# Patient Record
Sex: FEMALE | Race: WHITE | NOT HISPANIC OR LATINO | Employment: FULL TIME | URBAN - METROPOLITAN AREA
[De-identification: names, ages, dates, MRNs, and addresses within clinical notes are randomized per-mention and may not be internally consistent; named-entity substitution may affect disease eponyms.]

---

## 2015-01-02 LAB — EXTERNAL HIV SCREEN: NORMAL

## 2017-03-17 ENCOUNTER — APPOINTMENT (EMERGENCY)
Dept: RADIOLOGY | Facility: HOSPITAL | Age: 27
End: 2017-03-17
Payer: COMMERCIAL

## 2017-03-17 ENCOUNTER — HOSPITAL ENCOUNTER (EMERGENCY)
Facility: HOSPITAL | Age: 27
Discharge: HOME | End: 2017-03-17
Attending: EMERGENCY MEDICINE | Admitting: EMERGENCY MEDICINE
Payer: COMMERCIAL

## 2017-03-17 VITALS
DIASTOLIC BLOOD PRESSURE: 58 MMHG | TEMPERATURE: 97.8 F | RESPIRATION RATE: 16 BRPM | WEIGHT: 253.53 LBS | HEART RATE: 77 BPM | OXYGEN SATURATION: 97 % | SYSTOLIC BLOOD PRESSURE: 102 MMHG

## 2017-03-17 DIAGNOSIS — S20.219A CHEST WALL CONTUSION: ICD-10-CM

## 2017-03-17 DIAGNOSIS — S46.911A RIGHT SHOULDER STRAIN: ICD-10-CM
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
## 2017-03-17 PROCEDURE — 99284 EMERGENCY DEPT VISIT MOD MDM: CPT

## 2017-03-17 PROCEDURE — 71101 X-RAY EXAM UNILAT RIBS/CHEST: CPT

## 2017-03-17 PROCEDURE — 73030 X-RAY EXAM OF SHOULDER: CPT

## 2017-03-17 RX ORDER — IBUPROFEN 400 MG/1
200 TABLET ORAL EVERY 6 HOURS PRN
COMMUNITY
End: 2019-05-07

## 2017-03-17 RX ORDER — IBUPROFEN 600 MG/1
600 TABLET ORAL ONCE
Status: COMPLETED | OUTPATIENT
Start: 2017-03-17 | End: 2017-03-17

## 2017-03-17 RX ORDER — HYDROCODONE BITARTRATE AND ACETAMINOPHEN 5; 325 MG/1; MG/1
1 TABLET ORAL EVERY 6 HOURS PRN
COMMUNITY
End: 2017-08-03 | Stop reason: ALTCHOICE

## 2017-03-17 RX ORDER — IBUPROFEN 600 MG/1
600 TABLET ORAL EVERY 6 HOURS PRN
Qty: 30 TABLET | Refills: 0 | Status: SHIPPED | OUTPATIENT
Start: 2017-03-17 | End: 2017-08-03 | Stop reason: ALTCHOICE

## 2017-03-17 RX ADMIN — IBUPROFEN 600 MG: 600 TABLET, FILM COATED ORAL at 17:31

## 2017-08-03 ENCOUNTER — APPOINTMENT (EMERGENCY)
Dept: RADIOLOGY | Facility: HOSPITAL | Age: 27
End: 2017-08-03
Payer: COMMERCIAL

## 2017-08-03 ENCOUNTER — HOSPITAL ENCOUNTER (EMERGENCY)
Facility: HOSPITAL | Age: 27
Discharge: HOME | End: 2017-08-03
Admitting: EMERGENCY MEDICINE
Payer: COMMERCIAL

## 2017-08-03 VITALS
OXYGEN SATURATION: 98 % | WEIGHT: 228 LBS | TEMPERATURE: 98.4 F | RESPIRATION RATE: 16 BRPM | SYSTOLIC BLOOD PRESSURE: 126 MMHG | DIASTOLIC BLOOD PRESSURE: 57 MMHG | HEART RATE: 71 BPM

## 2017-08-03 DIAGNOSIS — S93.401A RIGHT ANKLE SPRAIN: ICD-10-CM

## 2017-08-03 DIAGNOSIS — S89.91XA RIGHT KNEE INJURY, INITIAL ENCOUNTER: Primary | ICD-10-CM

## 2017-08-03 PROCEDURE — 73564 X-RAY EXAM KNEE 4 OR MORE: CPT

## 2017-08-03 PROCEDURE — 99283 EMERGENCY DEPT VISIT LOW MDM: CPT

## 2017-08-03 PROCEDURE — 73610 X-RAY EXAM OF ANKLE: CPT

## 2017-08-03 RX ORDER — NAPROXEN 500 MG/1
500 TABLET ORAL 2 TIMES DAILY WITH MEALS
Qty: 10 TABLET | Refills: 0 | Status: SHIPPED | OUTPATIENT
Start: 2017-08-03 | End: 2019-05-07

## 2017-08-03 RX ORDER — NAPROXEN 250 MG/1
500 TABLET ORAL ONCE
Status: COMPLETED | OUTPATIENT
Start: 2017-08-03 | End: 2017-08-03

## 2017-08-03 RX ADMIN — NAPROXEN 500 MG: 250 TABLET ORAL at 16:04

## 2019-01-20 ENCOUNTER — HOSPITAL ENCOUNTER (EMERGENCY)
Facility: HOSPITAL | Age: 29
Discharge: HOME | End: 2019-01-20
Attending: EMERGENCY MEDICINE | Admitting: EMERGENCY MEDICINE
Payer: COMMERCIAL

## 2019-01-20 ENCOUNTER — APPOINTMENT (EMERGENCY)
Dept: RADIOLOGY | Facility: HOSPITAL | Age: 29
End: 2019-01-20
Payer: COMMERCIAL

## 2019-01-20 VITALS
SYSTOLIC BLOOD PRESSURE: 102 MMHG | HEART RATE: 67 BPM | OXYGEN SATURATION: 98 % | TEMPERATURE: 98.5 F | DIASTOLIC BLOOD PRESSURE: 52 MMHG | WEIGHT: 220 LBS | RESPIRATION RATE: 16 BRPM

## 2019-01-20 DIAGNOSIS — J11.1 INFLUENZA: Primary | ICD-10-CM

## 2019-01-20 LAB
ANION GAP SERPL CALCULATED.3IONS-SCNC: 10 MMOL/L (ref 4–13)
BACTERIA UR QL AUTO: ABNORMAL /HPF
BASOPHILS # BLD AUTO: 0.01 THOUSANDS/ΜL (ref 0–0.1)
BASOPHILS NFR BLD AUTO: 0 % (ref 0–1)
BILIRUB UR QL STRIP: ABNORMAL
BUN SERPL-MCNC: 6 MG/DL (ref 5–25)
CALCIUM SERPL-MCNC: 8.6 MG/DL (ref 8.3–10.1)
CHLORIDE SERPL-SCNC: 106 MMOL/L (ref 100–108)
CLARITY UR: ABNORMAL
CLARITY, POC: ABNORMAL
CO2 SERPL-SCNC: 26 MMOL/L (ref 21–32)
COLOR UR: ABNORMAL
COLOR, POC: ABNORMAL
CREAT SERPL-MCNC: 0.66 MG/DL (ref 0.6–1.3)
EOSINOPHIL # BLD AUTO: 0.01 THOUSAND/ΜL (ref 0–0.61)
EOSINOPHIL NFR BLD AUTO: 0 % (ref 0–6)
ERYTHROCYTE [DISTWIDTH] IN BLOOD BY AUTOMATED COUNT: 13.9 % (ref 11.6–15.1)
EXT PREG TEST URINE: NEGATIVE
FLUAV AG SPEC QL IA: POSITIVE
FLUBV AG SPEC QL IA: NEGATIVE
GFR SERPL CREATININE-BSD FRML MDRD: 121 ML/MIN/1.73SQ M
GLUCOSE SERPL-MCNC: 89 MG/DL (ref 65–140)
GLUCOSE UR STRIP-MCNC: NEGATIVE MG/DL
HCT VFR BLD AUTO: 39 % (ref 34.8–46.1)
HGB BLD-MCNC: 13.2 G/DL (ref 11.5–15.4)
HGB UR QL STRIP.AUTO: NEGATIVE
IMM GRANULOCYTES # BLD AUTO: 0.01 THOUSAND/UL (ref 0–0.2)
IMM GRANULOCYTES NFR BLD AUTO: 0 % (ref 0–2)
KETONES UR STRIP-MCNC: ABNORMAL MG/DL
LEUKOCYTE ESTERASE UR QL STRIP: NEGATIVE
LYMPHOCYTES # BLD AUTO: 0.84 THOUSANDS/ΜL (ref 0.6–4.47)
LYMPHOCYTES NFR BLD AUTO: 22 % (ref 14–44)
MCH RBC QN AUTO: 29.5 PG (ref 26.8–34.3)
MCHC RBC AUTO-ENTMCNC: 33.8 G/DL (ref 31.4–37.4)
MCV RBC AUTO: 87 FL (ref 82–98)
MONOCYTES # BLD AUTO: 0.67 THOUSAND/ΜL (ref 0.17–1.22)
MONOCYTES NFR BLD AUTO: 18 % (ref 4–12)
MUCOUS THREADS UR QL AUTO: ABNORMAL
NEUTROPHILS # BLD AUTO: 2.21 THOUSANDS/ΜL (ref 1.85–7.62)
NEUTS SEG NFR BLD AUTO: 60 % (ref 43–75)
NITRITE UR QL STRIP: NEGATIVE
NON-SQ EPI CELLS URNS QL MICRO: ABNORMAL /HPF
NRBC BLD AUTO-RTO: 0 /100 WBCS
PH UR STRIP.AUTO: 6 [PH] (ref 4.5–8)
PLATELET # BLD AUTO: 198 THOUSANDS/UL (ref 149–390)
PMV BLD AUTO: 9.6 FL (ref 8.9–12.7)
POTASSIUM SERPL-SCNC: 3.3 MMOL/L (ref 3.5–5.3)
PROT UR STRIP-MCNC: ABNORMAL MG/DL
RBC # BLD AUTO: 4.47 MILLION/UL (ref 3.81–5.12)
RBC #/AREA URNS AUTO: ABNORMAL /HPF
S PYO AG THROAT QL: NEGATIVE
SODIUM SERPL-SCNC: 142 MMOL/L (ref 136–145)
SP GR UR STRIP.AUTO: 1.02 (ref 1–1.03)
UROBILINOGEN UR QL STRIP.AUTO: 0.2 E.U./DL
WBC # BLD AUTO: 3.75 THOUSAND/UL (ref 4.31–10.16)
WBC #/AREA URNS AUTO: ABNORMAL /HPF

## 2019-01-20 PROCEDURE — 81001 URINALYSIS AUTO W/SCOPE: CPT

## 2019-01-20 PROCEDURE — 80048 BASIC METABOLIC PNL TOTAL CA: CPT | Performed by: PHYSICIAN ASSISTANT

## 2019-01-20 PROCEDURE — 85025 COMPLETE CBC W/AUTO DIFF WBC: CPT | Performed by: PHYSICIAN ASSISTANT

## 2019-01-20 PROCEDURE — 81025 URINE PREGNANCY TEST: CPT | Performed by: PHYSICIAN ASSISTANT

## 2019-01-20 PROCEDURE — 87430 STREP A AG IA: CPT | Performed by: PHYSICIAN ASSISTANT

## 2019-01-20 PROCEDURE — 96360 HYDRATION IV INFUSION INIT: CPT

## 2019-01-20 PROCEDURE — 71046 X-RAY EXAM CHEST 2 VIEWS: CPT

## 2019-01-20 PROCEDURE — 99283 EMERGENCY DEPT VISIT LOW MDM: CPT

## 2019-01-20 PROCEDURE — 87400 INFLUENZA A/B EACH AG IA: CPT | Performed by: PHYSICIAN ASSISTANT

## 2019-01-20 PROCEDURE — 36415 COLL VENOUS BLD VENIPUNCTURE: CPT | Performed by: PHYSICIAN ASSISTANT

## 2019-01-20 RX ORDER — PROMETHAZINE HYDROCHLORIDE AND CODEINE PHOSPHATE 6.25; 1 MG/5ML; MG/5ML
5 SYRUP ORAL
Qty: 120 ML | Refills: 0 | Status: SHIPPED | OUTPATIENT
Start: 2019-01-20 | End: 2019-01-30

## 2019-01-20 RX ORDER — ACETAMINOPHEN 325 MG/1
650 TABLET ORAL ONCE
Status: COMPLETED | OUTPATIENT
Start: 2019-01-20 | End: 2019-01-20

## 2019-01-20 RX ORDER — GUAIFENESIN 200 MG/1
400 TABLET ORAL EVERY 4 HOURS PRN
Qty: 30 TABLET | Refills: 0 | Status: SHIPPED | OUTPATIENT
Start: 2019-01-20 | End: 2019-05-07

## 2019-01-20 RX ADMIN — SODIUM CHLORIDE 1000 ML: 0.9 INJECTION, SOLUTION INTRAVENOUS at 11:06

## 2019-01-20 RX ADMIN — ACETAMINOPHEN 650 MG: 325 TABLET, FILM COATED ORAL at 11:08

## 2019-01-20 NOTE — ED PROVIDER NOTES
History  Chief Complaint   Patient presents with    Flu Symptoms     Patient reports X2 days she has had a cough, patient states she was febrile at 103 6 and has also been experiencing body aches, fatigue, dizziness  and vomiting       28y  o female with PMH of anxiety, charcot-rosy-tooth disease and depression presents to the ER for cough for 3 days  Patient has also been experiencing fever (max 103 7), dyspnea when coughing, dizziness that comes and goes when standing up, post-tussive vomiting, myalgias and sore throat  She has been taking Motrin for pain with relief  She describes her cough as wet  Symptoms are constant  She denies sick contacts or recent travel  Associated symptoms: rhinorrhea  She denies chills, chest pain, nausea, diarrhea, abdominal pain, weakness or paresthesias  History provided by:  Patient   used: No        Prior to Admission Medications   Prescriptions Last Dose Informant Patient Reported? Taking?   ibuprofen (MOTRIN) 400 mg tablet   Yes No   Sig: Take 200 mg by mouth every 6 (six) hours as needed for mild pain     naproxen (NAPROSYN) 500 mg tablet   No No   Sig: Take 1 tablet by mouth 2 (two) times a day with meals for 5 days   sertraline (ZOLOFT) 50 mg tablet   Yes No   Sig: Take 50 mg by mouth as needed        Facility-Administered Medications: None       Past Medical History:   Diagnosis Date    Anxiety     CMTD (Charcot-Rosy-Tooth disease)     Depression        Past Surgical History:   Procedure Laterality Date    ANKLE SURGERY      DILATION AND CURETTAGE OF UTERUS      TUBAL LIGATION         History reviewed  No pertinent family history  I have reviewed and agree with the history as documented  Social History   Substance Use Topics    Smoking status: Never Smoker    Smokeless tobacco: Not on file    Alcohol use No        Review of Systems   Constitutional: Positive for activity change, appetite change and fever  Negative for chills     HENT: Positive for ear pain, rhinorrhea and sore throat  Negative for congestion, drooling, ear discharge and facial swelling  Eyes: Negative for redness  Respiratory: Positive for cough and shortness of breath  Cardiovascular: Negative for chest pain  Gastrointestinal: Positive for vomiting (post-tussive)  Negative for abdominal pain, diarrhea and nausea  Musculoskeletal: Negative for neck stiffness  Skin: Negative for rash  Allergic/Immunologic: Negative for food allergies  Neurological: Positive for dizziness and headaches  Negative for weakness and numbness  Physical Exam  Physical Exam   Constitutional:  Non-toxic appearance  No distress  HENT:   Head: Normocephalic and atraumatic  Right Ear: Tympanic membrane, external ear and ear canal normal  No drainage, swelling or tenderness  No foreign bodies  Tympanic membrane is not erythematous  No hemotympanum  Left Ear: Tympanic membrane, external ear and ear canal normal  No drainage, swelling or tenderness  No foreign bodies  Tympanic membrane is not erythematous  No hemotympanum  Nose: Rhinorrhea present  Mouth/Throat: Uvula is midline and mucous membranes are normal  No uvula swelling  Posterior oropharyngeal erythema present  No posterior oropharyngeal edema or tonsillar abscesses  No tonsillar exudate  Neck: Normal range of motion and phonation normal  Neck supple  No tracheal deviation present  Cardiovascular: Normal rate, regular rhythm, S1 normal, S2 normal and normal heart sounds  Exam reveals no gallop and no friction rub  No murmur heard  Pulmonary/Chest: Effort normal and breath sounds normal  No respiratory distress  She has no decreased breath sounds  She has no wheezes  She has no rhonchi  She has no rales  She exhibits no tenderness  Abdominal: Soft  Bowel sounds are normal  There is no tenderness  There is no rebound and no guarding  Neurological: She is alert  GCS eye subscore is 4   GCS verbal subscore is 5  GCS motor subscore is 6  Skin: Skin is warm and dry  No rash noted  Psychiatric: She has a normal mood and affect  Nursing note and vitals reviewed  Vital Signs  ED Triage Vitals   Temperature Pulse Respirations Blood Pressure SpO2   01/20/19 0937 01/20/19 0938 01/20/19 0938 01/20/19 0938 01/20/19 0938   (!) 97 °F (36 1 °C) 85 18 134/58 95 %      Temp Source Heart Rate Source Patient Position - Orthostatic VS BP Location FiO2 (%)   01/20/19 0937 01/20/19 0938 01/20/19 0938 01/20/19 0938 --   Temporal Monitor Sitting Right arm       Pain Score       01/20/19 0938       7           Vitals:    01/20/19 0938 01/20/19 1048 01/20/19 1257   BP: 134/58 115/70 102/52   Pulse: 85 66 67   Patient Position - Orthostatic VS: Sitting  Lying       Visual Acuity      ED Medications  Medications   sodium chloride 0 9 % bolus 1,000 mL (0 mL Intravenous Stopped 1/20/19 1206)   acetaminophen (TYLENOL) tablet 650 mg (650 mg Oral Given 1/20/19 1108)       Diagnostic Studies  Results Reviewed     Procedure Component Value Units Date/Time    Basic metabolic panel [[id number]]  (Abnormal) Collected:  01/20/19 1106    Lab Status:  Final result Specimen:  Blood from Arm, Right Updated:  01/20/19 1124     Sodium 142 mmol/L      Potassium 3 3 (L) mmol/L      Chloride 106 mmol/L      CO2 26 mmol/L      ANION GAP 10 mmol/L      BUN 6 mg/dL      Creatinine 0 66 mg/dL      Glucose 89 mg/dL      Calcium 8 6 mg/dL      eGFR 121 ml/min/1 73sq m     Narrative:         National Kidney Disease Education Program recommendations are as follows:  GFR calculation is accurate only with a steady state creatinine  Chronic Kidney disease less than 60 ml/min/1 73 sq  meters  Kidney failure less than 15 ml/min/1 73 sq  meters      CBC and differential [[id number]]  (Abnormal) Collected:  01/20/19 1106    Lab Status:  Final result Specimen:  Blood from Arm, Right Updated:  01/20/19 1115     WBC 3 75 (L) Thousand/uL      RBC 4 47 Million/uL      Hemoglobin 13 2 g/dL      Hematocrit 39 0 %      MCV 87 fL      MCH 29 5 pg      MCHC 33 8 g/dL      RDW 13 9 %      MPV 9 6 fL      Platelets 998 Thousands/uL      nRBC 0 /100 WBCs      Neutrophils Relative 60 %      Immat GRANS % 0 %      Lymphocytes Relative 22 %      Monocytes Relative 18 (H) %      Eosinophils Relative 0 %      Basophils Relative 0 %      Neutrophils Absolute 2 21 Thousands/µL      Immature Grans Absolute 0 01 Thousand/uL      Lymphocytes Absolute 0 84 Thousands/µL      Monocytes Absolute 0 67 Thousand/µL      Eosinophils Absolute 0 01 Thousand/µL      Basophils Absolute 0 01 Thousands/µL     Rapid Influenza Screen with Reflex PCR [[id number]]  (Abnormal) Collected:  01/20/19 1040    Lab Status:  Final result Specimen:  Nasopharyngeal from Nasopharyngeal Swab Updated:  01/20/19 1114     Rapid Influenza A Ag Positive (A)     Rapid Influenza B Ag Negative    Rapid Strep A Screen Only, Adults [[id number]]  (Normal) Collected:  01/20/19 1041    Lab Status:  Final result Specimen:  Throat from Throat Updated:  01/20/19 1101     Rapid Strep A Screen Negative    Urine Microscopic [[id number]]  (Abnormal) Collected:  01/20/19 1028    Lab Status:  Final result Specimen:  Urine from Urine, Clean Catch Updated:  01/20/19 1048     RBC, UA 0-1 (A) /hpf      WBC, UA 0-1 (A) /hpf      Epithelial Cells Moderate (A) /hpf      Bacteria, UA Moderate (A) /hpf      MUCUS THREADS Occasional (A)    POCT urinalysis dipstick [[id number]]  (Abnormal) Resulted:  01/20/19 1024    Lab Status:  Final result Specimen:  Urine Updated:  01/20/19 1024     Color, UA Elisa     Clarity, UA Cloudy    POCT pregnancy, urine [[id number]]  (Normal) Resulted:  01/20/19 1024    Lab Status:  Final result Updated:  01/20/19 1024     EXT PREG TEST UR (Ref: Negative) Negative    ED Urine Macroscopic [[id number]]  (Abnormal) Collected:  01/20/19 1028    Lab Status:  Final result Specimen:  Urine Updated:  01/20/19 1011     Color, UA Elisa     Clarity, UA Cloudy pH, UA 6 0     Leukocytes, UA Negative     Nitrite, UA Negative     Protein,  (2+) (A) mg/dl      Glucose, UA Negative mg/dl      Ketones, UA Trace (A) mg/dl      Urobilinogen, UA 0 2 E U /dl      Bilirubin, UA Interference- unable to analyze (A)     Blood, UA Negative     Specific Gravity, UA 1 025    Narrative:       CLINITEK RESULT                 XR chest 2 views   ED Interpretation by Kirby Fong PA-C (01/20 1056)   No acute cardiopulmonary findings seen by me at this time  Final Result by Angela Alonso MD (01/20 1034)      No acute cardiopulmonary disease  Workstation performed: [id number]                    Procedures  Procedures       Phone Contacts  ED Phone Contact    ED Course                               MDM  Number of Diagnoses or Management Options  Influenza: new and requires workup  Diagnosis management comments: DDX consists of but not limited to: flu, viral syndrome, pneumonia, bronchitis, dehydration    Will check CBC, BMP, pregnancy, urine, strep, flu and CXR  Will give fluids and Tylenol  Informed patient of lab and imaging findings  Will discharge  At discharge, I instructed the patient to:  -follow up with pcp  -take Tylenol or Motrin for pain or fever  -take Guaifenesin as prescribed for cough during the day  -take Phenergan with codeine as prescribed for cough at night  -rest and drink plenty of fluids  -return to the ER if symptoms worsened or new symptoms arose  Patient agreed to this plan and was stable at time of discharge         Amount and/or Complexity of Data Reviewed  Clinical lab tests: ordered and reviewed  Tests in the radiology section of CPT®: ordered and reviewed  Independent visualization of images, tracings, or specimens: yes    Patient Progress  Patient progress: stable    CritCare Time    Disposition  Final diagnoses:   Influenza     Time reflects when diagnosis was documented in both MDM as applicable and the Disposition within this note     Time User Action Codes Description Comment    1/20/2019 12:01 PM Edgar Corleyon PARESH Add [J11 1] Influenza       ED Disposition     ED Disposition Condition Comment    Discharge  Hi Walters discharge to home/self care  Condition at discharge: Stable        Follow-up Information     Follow up With Specialties Details Why Armando Anna MD Family Medicine Schedule an appointment as soon as possible for a visit in 1 day  [address]  [phone]            Discharge Medication List as of 1/20/2019 12:04 PM      START taking these medications    Details   guaiFENesin 200 MG tablet Take 2 tablets (400 mg total) by mouth every 4 (four) hours as needed for cough or congestion, Starting Sun 1/20/2019, Print      promethazine-codeine (PHENERGAN WITH CODEINE) 6 25-10 mg/5 mL syrup Take 5 mL by mouth daily at bedtime as needed for cough for up to 10 days, Starting Sun 1/20/2019, Until Wed 1/30/2019, Print         CONTINUE these medications which have NOT CHANGED    Details   ibuprofen (MOTRIN) 400 mg tablet Take 200 mg by mouth every 6 (six) hours as needed for mild pain  , Historical Med      naproxen (NAPROSYN) 500 mg tablet Take 1 tablet by mouth 2 (two) times a day with meals for 5 days, Starting Thu 8/3/2017, Until Tue 8/8/2017, Print      sertraline (ZOLOFT) 50 mg tablet Take 50 mg by mouth as needed  , Historical Med           No discharge procedures on file      ED Provider  Electronically Signed by           Angeles Meadows PA-C  01/20/19 0087

## 2019-01-20 NOTE — DISCHARGE INSTRUCTIONS
Influenza   WHAT YOU NEED TO KNOW:   Influenza (the flu) is an infection caused by the influenza virus  The flu is easily spread when an infected person coughs, sneezes, or has close contact with others  You may be able to spread the flu to others for 1 week or longer after signs or symptoms appear  DISCHARGE INSTRUCTIONS:   Call 911 for any of the following:   · You have trouble breathing, and your lips look purple or blue  · You have a seizure  Return to the emergency department if:   · You are dizzy, or you are urinating less or not at all  · You have a headache with a stiff neck, and you feel tired or confused  · You have new pain or pressure in your chest     · Your symptoms, such as shortness of breath, vomiting, or diarrhea, get worse  · Your symptoms, such as fever and coughing, seem to get better, but then get worse  Contact your healthcare provider if:   · You have new muscle pain or weakness  · You have questions or concerns about your condition or care  Medicines: You may need any of the following:  · Acetaminophen  decreases pain and fever  It is available without a doctor's order  Ask how much to take and how often to take it  Follow directions  Acetaminophen can cause liver damage if not taken correctly  · NSAIDs , such as ibuprofen, help decrease swelling, pain, and fever  This medicine is available with or without a doctor's order  NSAIDs can cause stomach bleeding or kidney problems in certain people  If you take blood thinner medicine, always ask your healthcare provider if NSAIDs are safe for you  Always read the medicine label and follow directions  · Antivirals  help fight a viral infection  · Take your medicine as directed  Contact your healthcare provider if you think your medicine is not helping or if you have side effects  Tell him or her if you are allergic to any medicine  Keep a list of the medicines, vitamins, and herbs you take   Include the amounts, and when and why you take them  Bring the list or the pill bottles to follow-up visits  Carry your medicine list with you in case of an emergency  Rest  as much as you can to help you recover  Drink liquids as directed  to help prevent dehydration  Ask how much liquid to drink each day and which liquids are best for you  Prevent the spread of influenza:   · Wash your hands often  Use soap and water  Wash your hands after you use the bathroom, change a child's diapers, or sneeze  Wash your hands before you prepare or eat food  Use gel hand cleanser when soap and water are not available  Do not touch your eyes, nose, or mouth unless you have washed your hands first            · Cover your mouth when you sneeze or cough  Cough into a tissue or the bend of your arm  · Clean shared items with a germ-killing   Clean table surfaces, doorknobs, and light switches  Do not share towels, silverware, and dishes with people who are sick  Wash bed sheets, towels, silverware, and dishes with soap and water  · Wear a mask  over your mouth and nose if you are sick or are near anyone who is sick  · Stay away from others  if you are sick  · Influenza vaccine  helps prevent influenza (flu)  Everyone older than 6 months should get a yearly influenza vaccine  Get the vaccine as soon as it is available, usually in September or October each year  Follow up with your healthcare provider as directed:  Write down your questions so you remember to ask them during your visits  © 2017 [address] [employer] Information is for End User's use only and may not be sold, redistributed or otherwise used for commercial purposes  All illustrations and images included in CareNotes® are the copyrighted property of [employer] , [employer]  or Reynaldo [employer]  The above information is an  only  It is not intended as medical advice for individual conditions or treatments   Talk to your doctor, nurse or pharmacist before following any medical regimen to see if it is safe and effective for you  DISCHARGE INSTRUCTIONS:    FOLLOW UP WITH YOUR PRIMARY CARE PROVIDER OR THE [address] [place] [address]  MAKE AN APPOINTMENT TO BE SEEN  TAKE TYLENOL OR MOTRIN FOR PAIN OR FEVER  TAKE GUAIFENESIN AS PRESCRIBED FOR COUGH DURING THE DAY  IF RASH, SHORTNESS OF BREATH OR TROUBLE SWALLOWING, STOP TAKING THE MEDICATION AND BE SEEN  TAKE PHENERGAN WITH CODEINE AS PRESCRIBED FOR COUGH AT NIGHT  IF RASH, SHORTNESS OF BREATH OR TROUBLE SWALLOWING, STOP TAKING THE MEDICATION AND BE SEEN  NO DRINKING, DRIVING OR OPERATING HEAVY MACHINERY WHILE TAKING THIS MEDICATION  REST AND DRINK PLENTY OF FLUIDS  IF SYMPTOMS WORSEN OR NEW SYMPTOMS ARISE, RETURN TO THE ER TO BE SEEN

## 2019-05-03 ENCOUNTER — HOSPITAL ENCOUNTER (EMERGENCY)
Facility: HOSPITAL | Age: 29
Discharge: HOME | End: 2019-05-03
Attending: EMERGENCY MEDICINE | Admitting: EMERGENCY MEDICINE

## 2019-05-03 VITALS
OXYGEN SATURATION: 98 % | TEMPERATURE: 98.3 F | HEART RATE: 69 BPM | RESPIRATION RATE: 16 BRPM | DIASTOLIC BLOOD PRESSURE: 55 MMHG | SYSTOLIC BLOOD PRESSURE: 109 MMHG | WEIGHT: 220.02 LBS

## 2019-05-03 DIAGNOSIS — G43.909 MIGRAINE HEADACHE: Primary | ICD-10-CM

## 2019-05-03 LAB
ALBUMIN SERPL BCP-MCNC: 3.4 G/DL (ref 3.5–5)
ALP SERPL-CCNC: 63 U/L (ref 46–116)
ALT SERPL W P-5'-P-CCNC: 37 U/L (ref 12–78)
ANION GAP SERPL CALCULATED.3IONS-SCNC: 8 MMOL/L (ref 4–13)
AST SERPL W P-5'-P-CCNC: 16 U/L (ref 5–45)
BASOPHILS # BLD AUTO: 0.03 THOUSANDS/ΜL (ref 0–0.1)
BASOPHILS NFR BLD AUTO: 0 % (ref 0–1)
BILIRUB SERPL-MCNC: 0.29 MG/DL (ref 0.2–1)
BILIRUB UR QL STRIP: NEGATIVE
BUN SERPL-MCNC: 10 MG/DL (ref 5–25)
CALCIUM SERPL-MCNC: 9 MG/DL (ref 8.3–10.1)
CHLORIDE SERPL-SCNC: 106 MMOL/L (ref 100–108)
CLARITY UR: CLEAR
CO2 SERPL-SCNC: 28 MMOL/L (ref 21–32)
COLOR UR: YELLOW
COLOR, POC: YELLOW
CREAT SERPL-MCNC: 0.63 MG/DL (ref 0.6–1.3)
EOSINOPHIL # BLD AUTO: 0.12 THOUSAND/ΜL (ref 0–0.61)
EOSINOPHIL NFR BLD AUTO: 2 % (ref 0–6)
ERYTHROCYTE [DISTWIDTH] IN BLOOD BY AUTOMATED COUNT: 13.7 % (ref 11.6–15.1)
EXT PREG TEST URINE: NEGATIVE
GFR SERPL CREATININE-BSD FRML MDRD: 122 ML/MIN/1.73SQ M
GLUCOSE SERPL-MCNC: 87 MG/DL (ref 65–140)
GLUCOSE UR STRIP-MCNC: NEGATIVE MG/DL
HCT VFR BLD AUTO: 38.6 % (ref 34.8–46.1)
HGB BLD-MCNC: 13.2 G/DL (ref 11.5–15.4)
HGB UR QL STRIP.AUTO: NEGATIVE
IMM GRANULOCYTES # BLD AUTO: 0.05 THOUSAND/UL (ref 0–0.2)
IMM GRANULOCYTES NFR BLD AUTO: 1 % (ref 0–2)
KETONES UR STRIP-MCNC: NEGATIVE MG/DL
LEUKOCYTE ESTERASE UR QL STRIP: NEGATIVE
LYMPHOCYTES # BLD AUTO: 2.72 THOUSANDS/ΜL (ref 0.6–4.47)
LYMPHOCYTES NFR BLD AUTO: 35 % (ref 14–44)
MCH RBC QN AUTO: 30.3 PG (ref 26.8–34.3)
MCHC RBC AUTO-ENTMCNC: 34.2 G/DL (ref 31.4–37.4)
MCV RBC AUTO: 89 FL (ref 82–98)
MONOCYTES # BLD AUTO: 0.48 THOUSAND/ΜL (ref 0.17–1.22)
MONOCYTES NFR BLD AUTO: 6 % (ref 4–12)
NEUTROPHILS # BLD AUTO: 4.43 THOUSANDS/ΜL (ref 1.85–7.62)
NEUTS SEG NFR BLD AUTO: 56 % (ref 43–75)
NITRITE UR QL STRIP: NEGATIVE
NRBC BLD AUTO-RTO: 0 /100 WBCS
PH UR STRIP.AUTO: 6 [PH] (ref 4.5–8)
PLATELET # BLD AUTO: 265 THOUSANDS/UL (ref 149–390)
PMV BLD AUTO: 9.3 FL (ref 8.9–12.7)
POTASSIUM SERPL-SCNC: 3.8 MMOL/L (ref 3.5–5.3)
PROT SERPL-MCNC: 6.9 G/DL (ref 6.4–8.2)
PROT UR STRIP-MCNC: NEGATIVE MG/DL
RBC # BLD AUTO: 4.36 MILLION/UL (ref 3.81–5.12)
SODIUM SERPL-SCNC: 142 MMOL/L (ref 136–145)
SP GR UR STRIP.AUTO: 1.01 (ref 1–1.03)
TROPONIN I SERPL-MCNC: 0.02 NG/ML
UROBILINOGEN UR QL STRIP.AUTO: 0.2 E.U./DL
WBC # BLD AUTO: 7.83 THOUSAND/UL (ref 4.31–10.16)

## 2019-05-03 PROCEDURE — 96374 THER/PROPH/DIAG INJ IV PUSH: CPT

## 2019-05-03 PROCEDURE — 96361 HYDRATE IV INFUSION ADD-ON: CPT

## 2019-05-03 PROCEDURE — 36415 COLL VENOUS BLD VENIPUNCTURE: CPT | Performed by: EMERGENCY MEDICINE

## 2019-05-03 PROCEDURE — 93005 ELECTROCARDIOGRAM TRACING: CPT

## 2019-05-03 PROCEDURE — 84484 ASSAY OF TROPONIN QUANT: CPT | Performed by: EMERGENCY MEDICINE

## 2019-05-03 PROCEDURE — 99283 EMERGENCY DEPT VISIT LOW MDM: CPT | Performed by: EMERGENCY MEDICINE

## 2019-05-03 PROCEDURE — 80053 COMPREHEN METABOLIC PANEL: CPT | Performed by: EMERGENCY MEDICINE

## 2019-05-03 PROCEDURE — 81003 URINALYSIS AUTO W/O SCOPE: CPT

## 2019-05-03 PROCEDURE — 85025 COMPLETE CBC W/AUTO DIFF WBC: CPT | Performed by: EMERGENCY MEDICINE

## 2019-05-03 PROCEDURE — 81025 URINE PREGNANCY TEST: CPT | Performed by: EMERGENCY MEDICINE

## 2019-05-03 PROCEDURE — 96375 TX/PRO/DX INJ NEW DRUG ADDON: CPT

## 2019-05-03 PROCEDURE — 99284 EMERGENCY DEPT VISIT MOD MDM: CPT

## 2019-05-03 RX ORDER — BUTALBITAL/ASPIRIN/CAFFEINE 50-325-40
1 CAPSULE ORAL EVERY 6 HOURS PRN
Qty: 15 CAPSULE | Refills: 0 | Status: SHIPPED | OUTPATIENT
Start: 2019-05-03 | End: 2019-05-13

## 2019-05-03 RX ORDER — IBUPROFEN 600 MG/1
600 TABLET ORAL EVERY 6 HOURS PRN
Qty: 20 TABLET | Refills: 0 | Status: SHIPPED | OUTPATIENT
Start: 2019-05-03 | End: 2019-05-07

## 2019-05-03 RX ORDER — DIPHENHYDRAMINE HYDROCHLORIDE 50 MG/ML
25 INJECTION INTRAMUSCULAR; INTRAVENOUS ONCE
Status: COMPLETED | OUTPATIENT
Start: 2019-05-03 | End: 2019-05-03

## 2019-05-03 RX ORDER — METOCLOPRAMIDE HYDROCHLORIDE 5 MG/ML
10 INJECTION INTRAMUSCULAR; INTRAVENOUS ONCE
Status: COMPLETED | OUTPATIENT
Start: 2019-05-03 | End: 2019-05-03

## 2019-05-03 RX ORDER — KETOROLAC TROMETHAMINE 30 MG/ML
30 INJECTION, SOLUTION INTRAMUSCULAR; INTRAVENOUS ONCE
Status: COMPLETED | OUTPATIENT
Start: 2019-05-03 | End: 2019-05-03

## 2019-05-03 RX ORDER — ONDANSETRON 4 MG/1
4 TABLET, FILM COATED ORAL EVERY 8 HOURS PRN
Qty: 12 TABLET | Refills: 0 | Status: SHIPPED | OUTPATIENT
Start: 2019-05-03 | End: 2019-05-07

## 2019-05-03 RX ADMIN — METOCLOPRAMIDE 10 MG: 5 INJECTION, SOLUTION INTRAMUSCULAR; INTRAVENOUS at 17:47

## 2019-05-03 RX ADMIN — DIPHENHYDRAMINE HYDROCHLORIDE 25 MG: 50 INJECTION, SOLUTION INTRAMUSCULAR; INTRAVENOUS at 17:46

## 2019-05-03 RX ADMIN — KETOROLAC TROMETHAMINE 30 MG: 30 INJECTION, SOLUTION INTRAMUSCULAR at 17:48

## 2019-05-03 RX ADMIN — SODIUM CHLORIDE 1000 ML: 0.9 INJECTION, SOLUTION INTRAVENOUS at 17:41

## 2019-05-04 LAB
ATRIAL RATE: 71 BPM
P AXIS: 54 DEGREES
PR INTERVAL: 180 MS
QRS AXIS: 27 DEGREES
QRSD INTERVAL: 96 MS
QT INTERVAL: 398 MS
QTC INTERVAL: 432 MS
T WAVE AXIS: 39 DEGREES
VENTRICULAR RATE: 71 BPM

## 2019-05-04 PROCEDURE — 93010 ELECTROCARDIOGRAM REPORT: CPT | Performed by: INTERNAL MEDICINE

## 2019-05-07 ENCOUNTER — HOSPITAL ENCOUNTER (EMERGENCY)
Facility: HOSPITAL | Age: 29
Discharge: HOME | End: 2019-05-07
Attending: EMERGENCY MEDICINE | Admitting: EMERGENCY MEDICINE

## 2019-05-07 VITALS
WEIGHT: 229.5 LBS | TEMPERATURE: 98.4 F | RESPIRATION RATE: 18 BRPM | OXYGEN SATURATION: 95 % | SYSTOLIC BLOOD PRESSURE: 105 MMHG | DIASTOLIC BLOOD PRESSURE: 63 MMHG | HEART RATE: 84 BPM

## 2019-05-07 DIAGNOSIS — R42 VERTIGO: Primary | ICD-10-CM

## 2019-05-07 DIAGNOSIS — Z53.29 LEFT AGAINST MEDICAL ADVICE: ICD-10-CM

## 2019-05-07 DIAGNOSIS — G43.909 MIGRAINE: ICD-10-CM

## 2019-05-07 LAB
ANION GAP SERPL CALCULATED.3IONS-SCNC: 7 MMOL/L (ref 4–13)
BASOPHILS # BLD AUTO: 0.04 THOUSANDS/ΜL (ref 0–0.1)
BASOPHILS NFR BLD AUTO: 0 % (ref 0–1)
BUN SERPL-MCNC: 12 MG/DL (ref 5–25)
CALCIUM SERPL-MCNC: 9.5 MG/DL (ref 8.3–10.1)
CHLORIDE SERPL-SCNC: 105 MMOL/L (ref 100–108)
CO2 SERPL-SCNC: 30 MMOL/L (ref 21–32)
CREAT SERPL-MCNC: 0.64 MG/DL (ref 0.6–1.3)
EOSINOPHIL # BLD AUTO: 0.13 THOUSAND/ΜL (ref 0–0.61)
EOSINOPHIL NFR BLD AUTO: 1 % (ref 0–6)
ERYTHROCYTE [DISTWIDTH] IN BLOOD BY AUTOMATED COUNT: 13.9 % (ref 11.6–15.1)
EXT PREG TEST URINE: NEGATIVE
GFR SERPL CREATININE-BSD FRML MDRD: 122 ML/MIN/1.73SQ M
GLUCOSE SERPL-MCNC: 77 MG/DL (ref 65–140)
HCT VFR BLD AUTO: 42.6 % (ref 34.8–46.1)
HGB BLD-MCNC: 14.5 G/DL (ref 11.5–15.4)
IMM GRANULOCYTES # BLD AUTO: 0.08 THOUSAND/UL (ref 0–0.2)
IMM GRANULOCYTES NFR BLD AUTO: 1 % (ref 0–2)
LYMPHOCYTES # BLD AUTO: 3.3 THOUSANDS/ΜL (ref 0.6–4.47)
LYMPHOCYTES NFR BLD AUTO: 35 % (ref 14–44)
MCH RBC QN AUTO: 30.2 PG (ref 26.8–34.3)
MCHC RBC AUTO-ENTMCNC: 34 G/DL (ref 31.4–37.4)
MCV RBC AUTO: 89 FL (ref 82–98)
MONOCYTES # BLD AUTO: 0.68 THOUSAND/ΜL (ref 0.17–1.22)
MONOCYTES NFR BLD AUTO: 7 % (ref 4–12)
NEUTROPHILS # BLD AUTO: 5.16 THOUSANDS/ΜL (ref 1.85–7.62)
NEUTS SEG NFR BLD AUTO: 56 % (ref 43–75)
NRBC BLD AUTO-RTO: 0 /100 WBCS
PLATELET # BLD AUTO: 326 THOUSANDS/UL (ref 149–390)
PMV BLD AUTO: 9.1 FL (ref 8.9–12.7)
POTASSIUM SERPL-SCNC: 4.3 MMOL/L (ref 3.5–5.3)
RBC # BLD AUTO: 4.8 MILLION/UL (ref 3.81–5.12)
SODIUM SERPL-SCNC: 142 MMOL/L (ref 136–145)
WBC # BLD AUTO: 9.39 THOUSAND/UL (ref 4.31–10.16)

## 2019-05-07 PROCEDURE — 96361 HYDRATE IV INFUSION ADD-ON: CPT

## 2019-05-07 PROCEDURE — 81025 URINE PREGNANCY TEST: CPT | Performed by: EMERGENCY MEDICINE

## 2019-05-07 PROCEDURE — 96365 THER/PROPH/DIAG IV INF INIT: CPT

## 2019-05-07 PROCEDURE — 99284 EMERGENCY DEPT VISIT MOD MDM: CPT

## 2019-05-07 PROCEDURE — 36415 COLL VENOUS BLD VENIPUNCTURE: CPT | Performed by: EMERGENCY MEDICINE

## 2019-05-07 PROCEDURE — 85025 COMPLETE CBC W/AUTO DIFF WBC: CPT | Performed by: EMERGENCY MEDICINE

## 2019-05-07 PROCEDURE — 80048 BASIC METABOLIC PNL TOTAL CA: CPT | Performed by: EMERGENCY MEDICINE

## 2019-05-07 PROCEDURE — 99284 EMERGENCY DEPT VISIT MOD MDM: CPT | Performed by: EMERGENCY MEDICINE

## 2019-05-07 PROCEDURE — 96375 TX/PRO/DX INJ NEW DRUG ADDON: CPT

## 2019-05-07 RX ORDER — MECLIZINE HCL 12.5 MG/1
25 TABLET ORAL ONCE
Status: COMPLETED | OUTPATIENT
Start: 2019-05-07 | End: 2019-05-07

## 2019-05-07 RX ORDER — MAGNESIUM SULFATE HEPTAHYDRATE 40 MG/ML
2 INJECTION, SOLUTION INTRAVENOUS ONCE
Status: COMPLETED | OUTPATIENT
Start: 2019-05-07 | End: 2019-05-07

## 2019-05-07 RX ORDER — METOCLOPRAMIDE HYDROCHLORIDE 5 MG/ML
10 INJECTION INTRAMUSCULAR; INTRAVENOUS ONCE
Status: COMPLETED | OUTPATIENT
Start: 2019-05-07 | End: 2019-05-07

## 2019-05-07 RX ORDER — DIPHENHYDRAMINE HYDROCHLORIDE 50 MG/ML
25 INJECTION INTRAMUSCULAR; INTRAVENOUS ONCE
Status: COMPLETED | OUTPATIENT
Start: 2019-05-07 | End: 2019-05-07

## 2019-05-07 RX ORDER — KETOROLAC TROMETHAMINE 30 MG/ML
30 INJECTION, SOLUTION INTRAMUSCULAR; INTRAVENOUS ONCE
Status: COMPLETED | OUTPATIENT
Start: 2019-05-07 | End: 2019-05-07

## 2019-05-07 RX ORDER — MECLIZINE HYDROCHLORIDE 25 MG/1
25-50 TABLET ORAL 3 TIMES DAILY PRN
Qty: 30 TABLET | Refills: 0 | Status: SHIPPED | OUTPATIENT
Start: 2019-05-07 | End: 2020-08-01

## 2019-05-07 RX ADMIN — KETOROLAC TROMETHAMINE 30 MG: 30 INJECTION, SOLUTION INTRAMUSCULAR at 12:21

## 2019-05-07 RX ADMIN — DIPHENHYDRAMINE HYDROCHLORIDE 25 MG: 50 INJECTION INTRAMUSCULAR; INTRAVENOUS at 12:22

## 2019-05-07 RX ADMIN — SODIUM CHLORIDE 1000 ML: 0.9 INJECTION, SOLUTION INTRAVENOUS at 12:20

## 2019-05-07 RX ADMIN — MECLIZINE 25 MG: 12.5 TABLET ORAL at 12:20

## 2019-05-07 RX ADMIN — METOCLOPRAMIDE 10 MG: 5 INJECTION, SOLUTION INTRAMUSCULAR; INTRAVENOUS at 12:21

## 2019-05-07 RX ADMIN — MAGNESIUM SULFATE HEPTAHYDRATE 2 G: 40 INJECTION, SOLUTION INTRAVENOUS at 12:25

## 2020-02-22 ENCOUNTER — APPOINTMENT (OUTPATIENT)
Dept: LAB | Facility: MEDICAL CENTER | Age: 30
End: 2020-02-22
Attending: PHYSICIAN ASSISTANT

## 2020-02-22 ENCOUNTER — TRANSCRIBE ORDERS (OUTPATIENT)
Dept: URGENT CARE | Facility: MEDICAL CENTER | Age: 30
End: 2020-02-22

## 2020-02-22 ENCOUNTER — APPOINTMENT (OUTPATIENT)
Dept: URGENT CARE | Facility: MEDICAL CENTER | Age: 30
End: 2020-02-22

## 2020-02-22 DIAGNOSIS — Z02.1 PRE-EMPLOYMENT EXAMINATION: ICD-10-CM

## 2020-02-22 DIAGNOSIS — Z02.1 PRE-EMPLOYMENT EXAMINATION: Primary | ICD-10-CM

## 2020-02-22 LAB — RUBV IGG SERPL IA-ACNC: >175 IU/ML

## 2020-02-22 PROCEDURE — 86762 RUBELLA ANTIBODY: CPT

## 2020-02-22 PROCEDURE — 36415 COLL VENOUS BLD VENIPUNCTURE: CPT

## 2020-02-22 PROCEDURE — 86480 TB TEST CELL IMMUN MEASURE: CPT

## 2020-02-22 PROCEDURE — 86735 MUMPS ANTIBODY: CPT

## 2020-02-22 PROCEDURE — 86765 RUBEOLA ANTIBODY: CPT

## 2020-02-22 PROCEDURE — 86787 VARICELLA-ZOSTER ANTIBODY: CPT

## 2020-02-24 LAB
GAMMA INTERFERON BACKGROUND BLD IA-ACNC: 0.02 IU/ML
M TB IFN-G BLD-IMP: NEGATIVE
M TB IFN-G CD4+ BCKGRND COR BLD-ACNC: 0.01 IU/ML
M TB IFN-G CD4+ BCKGRND COR BLD-ACNC: 0.01 IU/ML
MITOGEN IGNF BCKGRD COR BLD-ACNC: >10 IU/ML

## 2020-02-25 LAB
MEV IGG SER QL: NORMAL
MUV IGG SER QL: NORMAL

## 2020-02-26 LAB — VZV IGG SER IA-ACNC: NORMAL

## 2020-07-23 ENCOUNTER — TELEPHONE (OUTPATIENT)
Dept: ADMINISTRATIVE | Facility: OTHER | Age: 30
End: 2020-07-23

## 2020-07-23 ENCOUNTER — OFFICE VISIT (OUTPATIENT)
Dept: FAMILY MEDICINE CLINIC | Facility: CLINIC | Age: 30
End: 2020-07-23
Payer: COMMERCIAL

## 2020-07-23 VITALS
TEMPERATURE: 99.4 F | OXYGEN SATURATION: 97 % | HEIGHT: 64 IN | HEART RATE: 66 BPM | DIASTOLIC BLOOD PRESSURE: 68 MMHG | WEIGHT: 236.2 LBS | BODY MASS INDEX: 40.33 KG/M2 | SYSTOLIC BLOOD PRESSURE: 110 MMHG

## 2020-07-23 DIAGNOSIS — R10.32 LEFT LOWER QUADRANT ABDOMINAL PAIN: ICD-10-CM

## 2020-07-23 DIAGNOSIS — G60.0 CMTD (CHARCOT-MARIE-TOOTH DISEASE): ICD-10-CM

## 2020-07-23 DIAGNOSIS — Z76.89 ENCOUNTER TO ESTABLISH CARE: ICD-10-CM

## 2020-07-23 DIAGNOSIS — Z13.0 SCREENING FOR DEFICIENCY ANEMIA: ICD-10-CM

## 2020-07-23 DIAGNOSIS — G43.119 INTRACTABLE MIGRAINE WITH AURA WITHOUT STATUS MIGRAINOSUS: Primary | ICD-10-CM

## 2020-07-23 DIAGNOSIS — R10.13 EPIGASTRIC PAIN: ICD-10-CM

## 2020-07-23 DIAGNOSIS — Z13.1 SCREENING FOR DIABETES MELLITUS: ICD-10-CM

## 2020-07-23 PROCEDURE — 99203 OFFICE O/P NEW LOW 30 MIN: CPT | Performed by: NURSE PRACTITIONER

## 2020-07-23 PROCEDURE — 1036F TOBACCO NON-USER: CPT | Performed by: NURSE PRACTITIONER

## 2020-07-23 PROCEDURE — 3008F BODY MASS INDEX DOCD: CPT | Performed by: NURSE PRACTITIONER

## 2020-07-23 NOTE — TELEPHONE ENCOUNTER
----- Message from Enrico Bhatia sent at 7/23/2020 11:27 AM EDT -----  Regarding: [facility]-HIV  07/23/20 11:27 AM    Hello, our patient Inge Thomson has had HIV completed/performed  Please assist in updating the patient chart by pulling the document from the CE  The date of service is 263739       Thank you,  Fiorella So MA  [facility]

## 2020-07-23 NOTE — PROGRESS NOTES
[facility]    ASSESSMENT AND PLAN     1  Intractable migraine with aura without status migrainosus  Reviewed patient's symptoms at length today  Long history of migraines, with worsening features  Referral given today to establish for treatment with [facility] neurology  - Ambulatory referral to Neurology; Future    2  CMTD (Charcot-Rosy-Tooth disease)  Patient continues treating with Dr Miladis Cortes at [facility] outpatient    3  Epigastric pain  Patient with worsening epigastric/left lower quadrant pain  Known history of IBS  Patient has not had any scanning  With her worsening symptoms, will obtain CT and blood work  ER precautions should her symptoms worsen prior to workup completion    - CT abdomen pelvis w wo contrast; Future  - CT abdomen pelvis w contrast; Future    4  Left lower quadrant abdominal pain    - CT abdomen pelvis w wo contrast; Future  - CT abdomen pelvis w contrast; Future    5  Screening for deficiency anemia    - CBC and differential; Future    6  Encounter to establish care  Patient establishing primary care in our office    7  Screening for diabetes mellitus    - Comprehensive metabolic panel; Future            SUBJECTIVE       Patient ID: Gracie Bowie is a 34 y o  female  Chief Complaint   Patient presents with    Establish Care     Pt c/o headache for many years increasing in last cupple of years and has lumps on head for 2 years  abdominal pain getting worse for last 6 month  Pain when stannding, low grade fever and chills, had diaarhea for 5 days has not had much to ear  HISTORY OF PRESENT ILLNESS    Patient presents today to establish primary care in our office  She has several complaints today  Present complaint is worsening migraines  States she has had migraines since a child, but notes some worsening over the last few months  Experiencing several per week at times  They vary in severity/intensity    Most common symptoms are nausea/vomiting, blurred vision with vision loss at times  She does experience auras at times as well  She has green/purple spots  She will occasionally get vertigo and tinnitus of the right ear only  She has had several episodes over the years where she develops slurred speech  This has not happened recently  She does not take any medication on a daily basis  She will take Excedrin migraine  Sometimes her headaches will last for several days  She has noticed 3 lumps on her scalp over the last year  They are not ordinarily painful, but she does feel discomfort in those areas when she gets her migraines  She also has a history of CMTD  She sees neurologist Dr Judy Watson at [facility] for same  Dr Judy Watson does not treat her migraines though  Last complaint is worsening IBS  She has intermittent periods diarrhea and constipation, however diarrhea is more prevalent  She has tried to modify her diet and eliminate food triggers  She has limited corn, onions, sees, to made at, gluten, pasta, bread and potatoes  States her stomach pains have been worsening on the left lower side and epigastric  Heat will sometimes relieve  She recently transitioned to [employer]  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  She has had to miss several days at work secondary to her health concerns  She is looking to possibly have an intermittent FMLA        The following portions of the patient's history were reviewed and updated as appropriate: allergies, current medications, past family history, past medical history, past social history, past surgical history and problem list     REVIEW OF SYSTEMS  Review of Systems   Constitutional: Negative  HENT: Negative  Respiratory: Negative  Cardiovascular: Negative  Gastrointestinal: Positive for abdominal pain (as described in HPI), diarrhea, nausea and vomiting  Genitourinary: Negative  Musculoskeletal: Negative      Neurological: Positive for headaches (as described in HPI)  Psychiatric/Behavioral: Negative  OBJECTIVE      VITAL SIGNS  /68 (BP Location: Left arm, Patient Position: Sitting, Cuff Size: Adult)   Pulse 66   Temp 99 4 °F (37 4 °C) (Tympanic)   Ht 5' 4 37" (1 635 m)   Wt 107 kg (236 lb 3 2 oz)   LMP 06/18/2020 (Within Days)   SpO2 97%   Breastfeeding No   BMI 40 08 kg/m²     CURRENT MEDICATIONS    Current Outpatient Medications:     meclizine (ANTIVERT) 25 mg tablet, Take 1-2 tablets (25-50 mg total) by mouth 3 (three) times a day as needed for dizziness, Disp: 30 tablet, Rfl: 0      PHYSICAL EXAMINATION   Physical Exam   Constitutional: She is oriented to person, place, and time  Vital signs are normal  She appears well-developed and well-nourished  She does not appear ill  No distress  HENT:   Head: Normocephalic and atraumatic  Right Ear: Hearing, tympanic membrane, external ear and ear canal normal    Left Ear: Hearing, tympanic membrane, external ear and ear canal normal    Nose: Nose normal    Mouth/Throat: Mucous membranes are normal    Eyes: Pupils are equal, round, and reactive to light  Conjunctivae and EOM are normal    Neck: Carotid bruit is not present  Cardiovascular: Normal rate and regular rhythm  Pulmonary/Chest: Effort normal and breath sounds normal  No respiratory distress  Abdominal: Soft  Normal appearance and bowel sounds are normal  She exhibits no distension  There is generalized tenderness and tenderness in the epigastric area and left lower quadrant  There is rigidity  There is no rebound and no CVA tenderness  Neurological: She is alert and oriented to person, place, and time  No cranial nerve deficit or sensory deficit  GCS eye subscore is 4  GCS verbal subscore is 5  GCS motor subscore is 6  Skin: Skin is warm, dry and intact  Psychiatric: She has a normal mood and affect  Her speech is normal and behavior is normal    Nursing note and vitals reviewed

## 2020-07-23 NOTE — TELEPHONE ENCOUNTER
Upon review of the In Basket request we were able to locate, review, and update the patient chart as requested for HIV  Any additional questions or concerns should be emailed to the Practice Liaisons via [email or web address] email, please do not reply via In Basket      Thank you  Moustapha Radford MA

## 2020-07-23 NOTE — LETTER
July 23, 2020     Patient: Keo Patino   YOB: 1990   Date of Visit: 7/23/2020       To Whom it May Concern:    Keo Plan is under my professional care  She was seen in my office on 7/23/2020  Please excuse from work 7/22- 7/24/2020  She may return 7/27/2020  If you have any questions or concerns, please don't hesitate to call           Sincerely,          ANNEMARIE Moulton        CC: No Recipients

## 2020-07-27 ENCOUNTER — HOSPITAL ENCOUNTER (OUTPATIENT)
Dept: CT IMAGING | Facility: HOSPITAL | Age: 30
Discharge: HOME | End: 2020-07-27
Payer: COMMERCIAL

## 2020-07-27 DIAGNOSIS — R10.13 EPIGASTRIC PAIN: ICD-10-CM

## 2020-07-27 DIAGNOSIS — R10.32 LEFT LOWER QUADRANT ABDOMINAL PAIN: ICD-10-CM

## 2020-07-27 PROCEDURE — 74177 CT ABD & PELVIS W/CONTRAST: CPT

## 2020-07-27 RX ADMIN — IOHEXOL 100 ML: 350 INJECTION, SOLUTION INTRAVENOUS at 13:46

## 2020-07-28 ENCOUNTER — APPOINTMENT (EMERGENCY)
Dept: CT IMAGING | Facility: HOSPITAL | Age: 30
End: 2020-07-28
Payer: COMMERCIAL

## 2020-07-28 ENCOUNTER — APPOINTMENT (EMERGENCY)
Dept: RADIOLOGY | Facility: HOSPITAL | Age: 30
End: 2020-07-28
Payer: COMMERCIAL

## 2020-07-28 ENCOUNTER — HOSPITAL ENCOUNTER (EMERGENCY)
Facility: HOSPITAL | Age: 30
Discharge: HOME | End: 2020-07-28
Attending: EMERGENCY MEDICINE
Payer: COMMERCIAL

## 2020-07-28 ENCOUNTER — TELEPHONE (OUTPATIENT)
Dept: FAMILY MEDICINE CLINIC | Facility: CLINIC | Age: 30
End: 2020-07-28

## 2020-07-28 VITALS
DIASTOLIC BLOOD PRESSURE: 58 MMHG | RESPIRATION RATE: 16 BRPM | OXYGEN SATURATION: 98 % | BODY MASS INDEX: 39.54 KG/M2 | WEIGHT: 233.03 LBS | SYSTOLIC BLOOD PRESSURE: 103 MMHG | HEART RATE: 56 BPM | TEMPERATURE: 98.2 F

## 2020-07-28 DIAGNOSIS — N39.0 ACUTE UTI: ICD-10-CM

## 2020-07-28 DIAGNOSIS — R51.9 HEADACHE: ICD-10-CM

## 2020-07-28 DIAGNOSIS — Z87.19 HISTORY OF IBS: Primary | ICD-10-CM

## 2020-07-28 DIAGNOSIS — R10.9 ABDOMINAL PAIN, UNSPECIFIED ABDOMINAL LOCATION: Primary | ICD-10-CM

## 2020-07-28 LAB
ALBUMIN SERPL BCP-MCNC: 3.7 G/DL (ref 3.5–5)
ALP SERPL-CCNC: 53 U/L (ref 46–116)
ALT SERPL W P-5'-P-CCNC: 29 U/L (ref 12–78)
ANION GAP SERPL CALCULATED.3IONS-SCNC: 9 MMOL/L (ref 4–13)
AST SERPL W P-5'-P-CCNC: 17 U/L (ref 5–45)
ATRIAL RATE: 58 BPM
BACTERIA UR QL AUTO: ABNORMAL /HPF
BASOPHILS # BLD AUTO: 0.04 THOUSANDS/ΜL (ref 0–0.1)
BASOPHILS NFR BLD AUTO: 1 % (ref 0–1)
BILIRUB SERPL-MCNC: 0.8 MG/DL (ref 0.2–1)
BILIRUB UR QL STRIP: NEGATIVE
BUN SERPL-MCNC: 12 MG/DL (ref 5–25)
CALCIUM SERPL-MCNC: 8.8 MG/DL (ref 8.3–10.1)
CHLORIDE SERPL-SCNC: 105 MMOL/L (ref 100–108)
CLARITY UR: CLEAR
CO2 SERPL-SCNC: 26 MMOL/L (ref 21–32)
COLOR UR: YELLOW
CREAT SERPL-MCNC: 0.54 MG/DL (ref 0.6–1.3)
D DIMER PPP FEU-MCNC: 0.42 UG/ML FEU
EOSINOPHIL # BLD AUTO: 0.11 THOUSAND/ΜL (ref 0–0.61)
EOSINOPHIL NFR BLD AUTO: 2 % (ref 0–6)
ERYTHROCYTE [DISTWIDTH] IN BLOOD BY AUTOMATED COUNT: 13.1 % (ref 11.6–15.1)
EXT PREG TEST URINE: NEGATIVE
EXT. CONTROL ED NAV: NORMAL
GFR SERPL CREATININE-BSD FRML MDRD: 128 ML/MIN/1.73SQ M
GLUCOSE SERPL-MCNC: 89 MG/DL (ref 65–140)
GLUCOSE UR STRIP-MCNC: NEGATIVE MG/DL
HCT VFR BLD AUTO: 42.1 % (ref 34.8–46.1)
HGB BLD-MCNC: 14.5 G/DL (ref 11.5–15.4)
HGB UR QL STRIP.AUTO: ABNORMAL
IMM GRANULOCYTES # BLD AUTO: 0.04 THOUSAND/UL (ref 0–0.2)
IMM GRANULOCYTES NFR BLD AUTO: 1 % (ref 0–2)
KETONES UR STRIP-MCNC: ABNORMAL MG/DL
LEUKOCYTE ESTERASE UR QL STRIP: ABNORMAL
LIPASE SERPL-CCNC: 82 U/L (ref 73–393)
LYMPHOCYTES # BLD AUTO: 2.48 THOUSANDS/ΜL (ref 0.6–4.47)
LYMPHOCYTES NFR BLD AUTO: 40 % (ref 14–44)
MCH RBC QN AUTO: 30 PG (ref 26.8–34.3)
MCHC RBC AUTO-ENTMCNC: 34.4 G/DL (ref 31.4–37.4)
MCV RBC AUTO: 87 FL (ref 82–98)
MONOCYTES # BLD AUTO: 0.38 THOUSAND/ΜL (ref 0.17–1.22)
MONOCYTES NFR BLD AUTO: 6 % (ref 4–12)
MUCOUS THREADS UR QL AUTO: ABNORMAL
NEUTROPHILS # BLD AUTO: 3.18 THOUSANDS/ΜL (ref 1.85–7.62)
NEUTS SEG NFR BLD AUTO: 50 % (ref 43–75)
NITRITE UR QL STRIP: NEGATIVE
NON-SQ EPI CELLS URNS QL MICRO: ABNORMAL /HPF
NRBC BLD AUTO-RTO: 0 /100 WBCS
P AXIS: 36 DEGREES
PH UR STRIP.AUTO: 6.5 [PH] (ref 4.5–8)
PLATELET # BLD AUTO: 326 THOUSANDS/UL (ref 149–390)
PMV BLD AUTO: 9.3 FL (ref 8.9–12.7)
POTASSIUM SERPL-SCNC: 3.9 MMOL/L (ref 3.5–5.3)
PR INTERVAL: 188 MS
PROT SERPL-MCNC: 7.5 G/DL (ref 6.4–8.2)
PROT UR STRIP-MCNC: ABNORMAL MG/DL
QRS AXIS: 116 DEGREES
QRSD INTERVAL: 92 MS
QT INTERVAL: 414 MS
QTC INTERVAL: 406 MS
RBC # BLD AUTO: 4.84 MILLION/UL (ref 3.81–5.12)
RBC #/AREA URNS AUTO: ABNORMAL /HPF
SODIUM SERPL-SCNC: 140 MMOL/L (ref 136–145)
SP GR UR STRIP.AUTO: 1.02 (ref 1–1.03)
T WAVE AXIS: 16 DEGREES
TROPONIN I SERPL-MCNC: <0.02 NG/ML
UROBILINOGEN UR QL STRIP.AUTO: 0.2 E.U./DL
VENTRICULAR RATE: 58 BPM
WBC # BLD AUTO: 6.23 THOUSAND/UL (ref 4.31–10.16)
WBC #/AREA URNS AUTO: ABNORMAL /HPF

## 2020-07-28 PROCEDURE — 81001 URINALYSIS AUTO W/SCOPE: CPT

## 2020-07-28 PROCEDURE — 81025 URINE PREGNANCY TEST: CPT | Performed by: PHYSICIAN ASSISTANT

## 2020-07-28 PROCEDURE — 96374 THER/PROPH/DIAG INJ IV PUSH: CPT

## 2020-07-28 PROCEDURE — 87086 URINE CULTURE/COLONY COUNT: CPT

## 2020-07-28 PROCEDURE — 83690 ASSAY OF LIPASE: CPT | Performed by: PHYSICIAN ASSISTANT

## 2020-07-28 PROCEDURE — 93010 ELECTROCARDIOGRAM REPORT: CPT

## 2020-07-28 PROCEDURE — 93005 ELECTROCARDIOGRAM TRACING: CPT

## 2020-07-28 PROCEDURE — 85025 COMPLETE CBC W/AUTO DIFF WBC: CPT | Performed by: PHYSICIAN ASSISTANT

## 2020-07-28 PROCEDURE — 70496 CT ANGIOGRAPHY HEAD: CPT

## 2020-07-28 PROCEDURE — 70498 CT ANGIOGRAPHY NECK: CPT

## 2020-07-28 PROCEDURE — 70450 CT HEAD/BRAIN W/O DYE: CPT

## 2020-07-28 PROCEDURE — 99285 EMERGENCY DEPT VISIT HI MDM: CPT | Performed by: EMERGENCY MEDICINE

## 2020-07-28 PROCEDURE — 71046 X-RAY EXAM CHEST 2 VIEWS: CPT

## 2020-07-28 PROCEDURE — 84484 ASSAY OF TROPONIN QUANT: CPT | Performed by: PHYSICIAN ASSISTANT

## 2020-07-28 PROCEDURE — 96361 HYDRATE IV INFUSION ADD-ON: CPT

## 2020-07-28 PROCEDURE — 85379 FIBRIN DEGRADATION QUANT: CPT | Performed by: PHYSICIAN ASSISTANT

## 2020-07-28 PROCEDURE — 36415 COLL VENOUS BLD VENIPUNCTURE: CPT | Performed by: PHYSICIAN ASSISTANT

## 2020-07-28 PROCEDURE — 99285 EMERGENCY DEPT VISIT HI MDM: CPT

## 2020-07-28 PROCEDURE — 80053 COMPREHEN METABOLIC PANEL: CPT | Performed by: PHYSICIAN ASSISTANT

## 2020-07-28 RX ORDER — BUPROPION HYDROCHLORIDE 150 MG/1
150 TABLET, EXTENDED RELEASE ORAL 2 TIMES DAILY
COMMUNITY
End: 2021-05-31

## 2020-07-28 RX ORDER — CEPHALEXIN 500 MG/1
500 CAPSULE ORAL EVERY 6 HOURS SCHEDULED
Qty: 28 CAPSULE | Refills: 0 | Status: SHIPPED | OUTPATIENT
Start: 2020-07-28 | End: 2020-08-04

## 2020-07-28 RX ORDER — ONDANSETRON 2 MG/ML
4 INJECTION INTRAMUSCULAR; INTRAVENOUS ONCE
Status: COMPLETED | OUTPATIENT
Start: 2020-07-28 | End: 2020-07-28

## 2020-07-28 RX ADMIN — SODIUM CHLORIDE 1000 ML: 0.9 INJECTION, SOLUTION INTRAVENOUS at 12:20

## 2020-07-28 RX ADMIN — ONDANSETRON 4 MG: 2 INJECTION INTRAMUSCULAR; INTRAVENOUS at 12:20

## 2020-07-28 RX ADMIN — IOHEXOL 85 ML: 350 INJECTION, SOLUTION INTRAVENOUS at 14:15

## 2020-07-28 NOTE — ED PROVIDER NOTES
History  Chief Complaint   Patient presents with    Abdominal Pain     patient with increasing let sided abd pain  out patient CT done yesterday, results pending    Headache - Recurrent or Known Dx Migraines     patient states lumps on left side of head  as lumps grow in size pain is increasing  headaches increasing in frequency in pain and frequency     Patient presents ER for evaluation of abdominal pain and headaches  Patient states that she has had intermittent abdominal pain over the last few weeks however the last 2 days it has significantly worsened  Patient states the pain is in her left upper and lower abdomen  Denies anything that makes the pain worse  Patient states she was seen by her PCP ordered an outpatient CT scan that she got done yesterday however is unaware of the results  Patient states that she was unable to get the lab work done yesterday she did not realize that she had a fast prior to the lab work  Patient states that she has been taking Motrin and Excedrin for the pain with the most recent dose being around 4 hours PTA  Patient states that the pain feels like it is moving through her intestines however when she has a bowel movement it feels better    Patient states that the pain at its worst is a 10/10 however currently it is a 3/10  Patient states that she had a short episode of loose stools today  Also complains of mild associated nausea and dysuria  Patient states her only abdominal procedure was a D&C around 7 years ago  States she just finished her menstrual cycle  Patient denies any immunocompromising risk factors       Patient also notes that she has a history of migraines however her migraines have been increasingly getting worse  Patient states she occasionally gets visual changes with her migraines however this is not new    Patient also notes intermittent dizziness which she describes as "feeling like the floor is moving "  Patient also notes that she has found bumps on her head that appear to worsen during severe migraines  Patient states she took an Excedrin early this morning with moderate relief  Patient denies any head trauma  Denies ever having any neuro imaging  Patient denies any fevers, cough, shortness of breath, chest pain, vomiting, syncope, or any other concerning symptoms  Prior to Admission Medications   Prescriptions Last Dose Informant Patient Reported? Taking? buPROPion (WELLBUTRIN SR) 150 mg 12 hr tablet   Yes Yes   Sig: Take 150 mg by mouth 2 (two) times a day   meclizine (ANTIVERT) 25 mg tablet   No Yes   Sig: Take 1-2 tablets (25-50 mg total) by mouth 3 (three) times a day as needed for dizziness      Facility-Administered Medications: None       Past Medical History:   Diagnosis Date    Anxiety     CMTD (Charcot-Rosy-Tooth disease)     Depression     Migraines     Mitral valve prolapse        Past Surgical History:   Procedure Laterality Date    ANKLE SURGERY      DILATION AND CURETTAGE OF UTERUS      TUBAL LIGATION      WISDOM TOOTH EXTRACTION         Family History   Problem Relation Age of Onset    Heart disease Mother     No Known Problems Father      I have reviewed and agree with the history as documented  E-Cigarette/Vaping    E-Cigarette Use Never User      E-Cigarette/Vaping Substances    Nicotine No     THC No     CBD No     Flavoring No     Other No     Unknown No      Social History     Tobacco Use    Smoking status: Never Smoker    Smokeless tobacco: Never Used   Substance Use Topics    Alcohol use: Yes     Frequency: Monthly or less     Drinks per session: 1 or 2     Binge frequency: Never    Drug use: No       Review of Systems   Constitutional: Negative for fever  HENT: Negative for congestion, rhinorrhea and sore throat  Respiratory: Negative for shortness of breath  Cardiovascular: Negative for chest pain  Gastrointestinal: Positive for abdominal pain and nausea   Negative for vomiting  Genitourinary: Negative for dysuria  Musculoskeletal: Negative for back pain and neck pain  Skin: Negative for rash  Neurological: Positive for headaches  Negative for weakness and numbness  All other systems reviewed and are negative  Physical Exam  Physical Exam   Constitutional: She is oriented to person, place, and time  She appears well-developed and well-nourished  HENT:   Head: Normocephalic and atraumatic  Nose: Nose normal    Eyes: Pupils are equal, round, and reactive to light  Conjunctivae and EOM are normal    No nystagmus   Neck: Normal range of motion  Cardiovascular: Normal rate and regular rhythm  Pulmonary/Chest: Effort normal and breath sounds normal  No stridor  No respiratory distress  She has no wheezes  She has no rales  Abdominal: Soft  There is tenderness  Tenderness palpation of epigastric, left upper quadrant, left lower quadrant  No tenderness palpation of right upper quadrant or right lower quadrant  Musculoskeletal: Normal range of motion  Neurological: She is alert and oriented to person, place, and time  GCS:  15/15   Skin: Skin is warm  Capillary refill takes less than 2 seconds  Psychiatric: She has a normal mood and affect         Vital Signs  ED Triage Vitals   Temperature Pulse Respirations Blood Pressure SpO2   07/28/20 1148 07/28/20 1148 07/28/20 1148 07/28/20 1148 07/28/20 1148   98 2 °F (36 8 °C) 67 16 (!) 167/101 95 %      Temp src Heart Rate Source Patient Position - Orthostatic VS BP Location FiO2 (%)   -- 07/28/20 1148 07/28/20 1148 07/28/20 1148 --    Monitor Sitting Right arm       Pain Score       07/28/20 1343       6           Vitals:    07/28/20 1148 07/28/20 1343   BP: (!) 167/101 103/58   Pulse: 67 56   Patient Position - Orthostatic VS: Sitting Lying         Visual Acuity  Visual Acuity      Most Recent Value   L Pupil Size (mm)  4   R Pupil Size (mm)  4          ED Medications  Medications   sodium chloride 0 9 % bolus 1,000 mL (0 mL Intravenous Stopped 7/28/20 1403)   ondansetron (ZOFRAN) injection 4 mg (4 mg Intravenous Given 7/28/20 1220)   iohexol (OMNIPAQUE) 350 MG/ML injection (MULTI-DOSE) 85 mL (85 mL Intravenous Given 7/28/20 1415)       Diagnostic Studies  Results Reviewed     Procedure Component Value Units Date/Time    Urine Microscopic [[id number]]  (Abnormal) Collected:  07/28/20 1226    Lab Status:  Final result Specimen:  Urine, Other Updated:  07/28/20 1251     RBC, UA 0-1 /hpf      WBC, UA 10-20 /hpf      Epithelial Cells Occasional /hpf      Bacteria, UA Innumerable /hpf      MUCUS THREADS Occasional    Urine culture [[id number]] Collected:  07/28/20 1226    Lab Status:   In process Specimen:  Urine, Other Updated:  07/28/20 1251    Troponin I [[id number]]  (Normal) Collected:  07/28/20 1208    Lab Status:  Final result Specimen:  Blood from Arm, Right Updated:  07/28/20 1247     Troponin I <0 02 ng/mL     Comprehensive metabolic panel [[id number]]  (Abnormal) Collected:  07/28/20 1208    Lab Status:  Final result Specimen:  Blood from Arm, Right Updated:  07/28/20 1244     Sodium 140 mmol/L      Potassium 3 9 mmol/L      Chloride 105 mmol/L      CO2 26 mmol/L      ANION GAP 9 mmol/L      BUN 12 mg/dL      Creatinine 0 54 mg/dL      Glucose 89 mg/dL      Calcium 8 8 mg/dL      AST 17 U/L      ALT 29 U/L      Alkaline Phosphatase 53 U/L      Total Protein 7 5 g/dL      Albumin 3 7 g/dL      Total Bilirubin 0 80 mg/dL      eGFR 128 ml/min/1 73sq m     Narrative:       [place] guidelines for Chronic Kidney Disease (CKD):     Stage 1 with normal or high GFR (GFR > 90 mL/min/1 73 square meters)    Stage 2 Mild CKD (GFR = 60-89 mL/min/1 73 square meters)    Stage 3A Moderate CKD (GFR = 45-59 mL/min/1 73 square meters)    Stage 3B Moderate CKD (GFR = 30-44 mL/min/1 73 square meters)    Stage 4 Severe CKD (GFR = 15-29 mL/min/1 73 square meters)    Stage 5 End Stage CKD (GFR <15 mL/min/1 73 square meters)  Note: GFR calculation is accurate only with a steady state creatinine    Lipase [[id number]]  (Normal) Collected:  07/28/20 1208    Lab Status:  Final result Specimen:  Blood from Arm, Right Updated:  07/28/20 1244     Lipase 82 u/L     D-Dimer [[id number]]  (Normal) Collected:  07/28/20 1208    Lab Status:  Final result Specimen:  Blood from Arm, Right Updated:  07/28/20 1241     D-Dimer, Quant 0 42 ug/ml FEU     CBC and differential [[id number]] Collected:  07/28/20 1208    Lab Status:  Final result Specimen:  Blood from Arm, Right Updated:  07/28/20 1233     WBC 6 23 Thousand/uL      RBC 4 84 Million/uL      Hemoglobin 14 5 g/dL      Hematocrit 42 1 %      MCV 87 fL      MCH 30 0 pg      MCHC 34 4 g/dL      RDW 13 1 %      MPV 9 3 fL      Platelets 817 Thousands/uL      nRBC 0 /100 WBCs      Neutrophils Relative 50 %      Immat GRANS % 1 %      Lymphocytes Relative 40 %      Monocytes Relative 6 %      Eosinophils Relative 2 %      Basophils Relative 1 %      Neutrophils Absolute 3 18 Thousands/µL      Immature Grans Absolute 0 04 Thousand/uL      Lymphocytes Absolute 2 48 Thousands/µL      Monocytes Absolute 0 38 Thousand/µL      Eosinophils Absolute 0 11 Thousand/µL      Basophils Absolute 0 04 Thousands/µL     POCT urinalysis dipstick [[id number]]  (Abnormal) Resulted:  07/28/20 1230    Lab Status:  Final result Specimen:  Urine Updated:  07/28/20 1230    POCT pregnancy, urine [[id number]]  (Normal) Resulted:  07/28/20 1229    Lab Status:  Final result Updated:  07/28/20 1230     EXT PREG TEST UR (Ref: Negative) negative     Control valid    Urine Macroscopic, POC [[id number]]  (Abnormal) Collected:  07/28/20 1226    Lab Status:  Final result Specimen:  Urine Updated:  07/28/20 1227     Color, UA Yellow     Clarity, UA Clear     pH, UA 6 5     Leukocytes, UA Small     Nitrite, UA Negative     Protein, UA Trace mg/dl      Glucose, UA Negative mg/dl      Ketones, UA 15 (1+) mg/dl Urobilinogen, UA 0 2 E U /dl      Bilirubin, UA Negative     Blood, UA Small     Specific [place], UA 1 025    Narrative:       CLINITEK RESULT                 CTA head and neck with and without contrast   Final Result by Daren Duarte MD (07/28 1452)      No significant stenosis of the extracranial carotid or vertebral arteries  No significant intracranial stenosis, vessel occlusion or aneurysm  Workstation performed: [id number]         XR chest 2 views   Final Result by Eduardo Landeros MD (07/28 1403)      No acute cardiopulmonary disease  Workstation performed: [id number]         CT head without contrast   Final Result by Himanshu Nguyen MD (07/28 1317)      Focally increased density at the left terminal ICA, may represent thrombosis  Recommend clinical evaluation for right-sided defects  If further imaging is desired, suggest head CTA  I personally discussed this study  with florin Ruben on 7/28/2020 at 1:17 PM          Workstation performed: [id number]                    Procedures  Procedures         ED Course  ED Course as of Jul 28 1513   Tue Jul 28, 2020   1154 Blood Pressure(!): 167/101   1154 Temperature: 98 2 °F (36 8 °C)   1154 Pulse: 67   1154 Respirations: 16   1154 SpO2: 95 %   1216 Reviewed patient's outpatient CT scan from yesterday:    IMPRESSION:     Unremarkable examination  No acute findings to account for patient's abdominal pain  1250 Leukocytes, UA(!): Small   1250 D-Dimer, Quant: 0 42   1254 WBC, UA(!): 10-20   1318 Increased density of left ICA  Reviewed with Radiology, states likely artifact however cannot rule out clot  Patient has no right-sided deficits at this time  Reviewed recommendation of CT a to further evaluate  Discussed with patient who is agreeable to plan     CT head without contrast   1420 No acute abnormality   XR chest 2 views   1455 No acute abnormality   CTA head and neck with and without contrast   1507 Patient resting comfortably throughout entire ER stay          US AUDIT      Most Recent Value   Initial Alcohol Screen: US AUDIT-C    1  How often do you have a drink containing alcohol?  0 Filed at: 07/28/2020 1220   2  How many drinks containing alcohol do you have on a typical day you are drinking? 0 Filed at: 07/28/2020 1220   3a  Male UNDER 65: How often do you have five or more drinks on one occasion? 0 Filed at: 07/28/2020 1220   3b  FEMALE Any Age, or MALE 65+: How often do you have 4 or more drinks on one occassion?   0 Filed at: 07/28/2020 1220   Audit-C Score  0 Filed at: 07/28/2020 1220               Results for orders placed or performed during the hospital encounter of 07/28/20   Comprehensive metabolic panel   Result Value Ref Range    Sodium 140 136 - 145 mmol/L    Potassium 3 9 3 5 - 5 3 mmol/L    Chloride 105 100 - 108 mmol/L    CO2 26 21 - 32 mmol/L    ANION GAP 9 4 - 13 mmol/L    BUN 12 5 - 25 mg/dL    Creatinine 0 54 (L) 0 60 - 1 30 mg/dL    Glucose 89 65 - 140 mg/dL    Calcium 8 8 8 3 - 10 1 mg/dL    AST 17 5 - 45 U/L    ALT 29 12 - 78 U/L    Alkaline Phosphatase 53 46 - 116 U/L    Total Protein 7 5 6 4 - 8 2 g/dL    Albumin 3 7 3 5 - 5 0 g/dL    Total Bilirubin 0 80 0 20 - 1 00 mg/dL    eGFR 128 ml/min/1 73sq m   CBC and differential   Result Value Ref Range    WBC 6 23 4 31 - 10 16 Thousand/uL    RBC 4 84 3 81 - 5 12 Million/uL    Hemoglobin 14 5 11 5 - 15 4 g/dL    Hematocrit 42 1 34 8 - 46 1 %    MCV 87 82 - 98 fL    MCH 30 0 26 8 - 34 3 pg    MCHC 34 4 31 4 - 37 4 g/dL    RDW 13 1 11 6 - 15 1 %    MPV 9 3 8 9 - 12 7 fL    Platelets 025 330 - 114 Thousands/uL    nRBC 0 /100 WBCs    Neutrophils Relative 50 43 - 75 %    Immat GRANS % 1 0 - 2 %    Lymphocytes Relative 40 14 - 44 %    Monocytes Relative 6 4 - 12 %    Eosinophils Relative 2 0 - 6 %    Basophils Relative 1 0 - 1 %    Neutrophils Absolute 3 18 1 85 - 7 62 Thousands/µL    Immature Grans Absolute 0 04 0 00 - 0 20 Thousand/uL Lymphocytes Absolute 2 48 0 60 - 4 47 Thousands/µL    Monocytes Absolute 0 38 0 17 - 1 22 Thousand/µL    Eosinophils Absolute 0 11 0 00 - 0 61 Thousand/µL    Basophils Absolute 0 04 0 00 - 0 10 Thousands/µL   Lipase   Result Value Ref Range    Lipase 82 73 - 393 u/L   D-Dimer   Result Value Ref Range    D-Dimer, Quant 0 42 <0 50 ug/ml FEU   Troponin I   Result Value Ref Range    Troponin I <0 02 <=0 04 ng/mL   Urine Microscopic   Result Value Ref Range    RBC, UA 0-1 (A) None Seen, 0-5 /hpf    WBC, UA 10-20 (A) None Seen, 0-5, 5-55, 5-65 /hpf    Epithelial Cells Occasional None Seen, Occasional /hpf    Bacteria, UA Innumerable (A) None Seen, Occasional /hpf    MUCUS THREADS Occasional (A) None Seen   POCT pregnancy, urine   Result Value Ref Range    EXT PREG TEST UR (Ref: Negative) negative     Control valid    Urine Macroscopic, POC   Result Value Ref Range    Color, UA Yellow     Clarity, UA Clear     pH, UA 6 5 4 5 - 8 0    Leukocytes, UA Small (A) Negative    Nitrite, UA Negative Negative    Protein, UA Trace (A) Negative mg/dl    Glucose, UA Negative Negative mg/dl    Ketones, UA 15 (1+) (A) Negative mg/dl    Urobilinogen, UA 0 2 0 2, 1 0 E U /dl E U /dl    Bilirubin, UA Negative Negative    Blood, UA Small (A) Negative    Specific Gravity, UA 1 025 1 003 - 1 030       CTA head and neck with and without contrast   Final Result by Daren Duarte MD (07/28 2796)      No significant stenosis of the extracranial carotid or vertebral arteries  No significant intracranial stenosis, vessel occlusion or aneurysm  Workstation performed: [id number]         XR chest 2 views   Final Result by Eduardo Landeros MD (07/28 8938)      No acute cardiopulmonary disease  Workstation performed: [id number]         CT head without contrast   Final Result by Himanshu Nguyen MD (07/28 1317)      Focally increased density at the left terminal ICA, may represent thrombosis    Recommend clinical evaluation for right-sided defects  If further imaging is desired, suggest head CTA  I personally discussed this study  with Burt Linder on 7/28/2020 at 1:17 PM          Workstation performed: [id number]                   MDM     See ED course above    Patient's labs and imaging grossly benign with exception of UTI per patient resting comfortably throughout entire ER stay, did not require any pain medication  No vomiting in the ER  Patient given nausea medicine with improvement  Discussed treatment for UTI as well as follow-up with the GI specialist     Patient in no acute distress throughout ER stay  Vitals stable and reassuring  Patient stable for discharge at this time  Reviewed plan with patient/family  Reviewed red flag symptoms and strict return instructions  Patient/family voiced understanding and agreement to plan  Patient/family had opportunity to ask questions and all questions were answered at bedside  Disposition  Final diagnoses:   Abdominal pain, unspecified abdominal location   Headache   Acute UTI     Time reflects when diagnosis was documented in both MDM as applicable and the Disposition within this note     Time User Action Codes Description Comment    7/28/2020  3:05 PM Gerardo Saad Add [R10 9] Abdominal pain, unspecified abdominal location     7/28/2020  3:05 PM Gerardohortensia Gascas Add [R51] Headache     7/28/2020  3:06 PM Gerardogracie Nash Add [N39 0] Acute UTI       ED Disposition     ED Disposition Condition Date/Time Comment    Discharge Stable Tue Jul 28, 2020  3:05 PM Radha Carson discharge to home/self care              Follow-up Information     Follow up With Specialties Details Why Contact Info Additional [address] [facility] Emergency Department Emergency Medicine  If symptoms worsen [facility] [id number] [phone] [place] ED, [address] [facility] [address]  , [place], [place], [address] [facility]  As discussed follow up with the GI specialist for your abdominal pain [address]  [place] [id number]  Lorenzo Rodas [facility] [place], [address], [facility], [place], [address] [phone]          Patient's Medications   Discharge Prescriptions    CEPHALEXIN (KEFLEX) 500 MG CAPSULE    Take 1 capsule (500 mg total) by mouth every 6 (six) hours for 7 days       Start Date: 7/28/2020 End Date: 8/4/2020       Order Dose: 500 mg       Quantity: 28 capsule    Refills: 0     No discharge procedures on file      PDMP Review     None          ED Provider  Electronically Signed by           Rosa Maria Ceballos PA-C  07/28/20 6389

## 2020-07-28 NOTE — TELEPHONE ENCOUNTER
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

## 2020-07-28 NOTE — TELEPHONE ENCOUNTER
Phone call from patient stating she is still having abdominal pain, currently pain level 5/10  She states when she feels like she needs to have a BM it is 8 or 9/10  She was having diarrhea earlier, but now she feels like she has to go but nothing is coming out  She had CT done yesterday but results are pending  She wants to know if she should come back in for an appointment? Advised her I would check with [facility], but without CT results we might be limited to what we can do  Advised to go to ER if pain worsens  Please advise

## 2020-07-28 NOTE — TELEPHONE ENCOUNTER
Kevin Nesbitt, would you please call and see if we can get the results sooner  When the patient was in for her appointment, she stated that her symptoms have been ongoing for several months, stated at that time, that she does have a history of IBS  She has been making dietary changes over the last few months  so has something drastically changed? If her symptoms have significantly changed or worsened, she could certainly go to the ED for more immediate evaluation    I have also placed a referral to GI, so she may call and schedule  an appointment to establish care and for further evaluation/treatment options

## 2020-07-29 LAB — BACTERIA UR CULT: NORMAL

## 2020-07-31 ENCOUNTER — TELEPHONE (OUTPATIENT)
Dept: FAMILY MEDICINE CLINIC | Facility: CLINIC | Age: 30
End: 2020-07-31

## 2020-07-31 NOTE — TELEPHONE ENCOUNTER
Voicemail from patient asking for out of work note for 7/30 and 7/31  She was in the ER 7/28/20 and states she was cleared to go back to work 7/29/20  She states she was having trouble having a bowel movement and did not return to work  She states when she was able to go it was diarrhea  She is feeling better today but is still out of work  She is asking for a return to work note dated for Monday 8/3/20  Please advise

## 2020-07-31 NOTE — TELEPHONE ENCOUNTER
I spoke to patient and explained to her that we would not give her a note excusing her from work Thursday 7/30/20 and Friday 7/31/20  She was given a note in the ER excusing her from work Tuesday 7/28/20 and was told she could return to work the next day  She did not go to work because of "bathroom issues" but states that her employer (  [employer]) is requesting a note stating she can return to work  I informed her she shouldn't need a note to return to work since she was never told she had to stay out  She requests an appointment for Saturday to discuss with doctor  She was given an appointment 8/1/20 with Dr Lawson Alexis

## 2020-07-31 NOTE — TELEPHONE ENCOUNTER
Unfortunately, without having evaluated her since her ER visit several days ago, I would not be able to supply an out of work note for those days

## 2020-07-31 NOTE — TELEPHONE ENCOUNTER
FATMATA Schmidt is scheduled for appt tomorrow with you  She was new to our office  She has not been evaluated since her ER visit

## 2020-08-01 ENCOUNTER — OFFICE VISIT (OUTPATIENT)
Dept: FAMILY MEDICINE CLINIC | Facility: CLINIC | Age: 30
End: 2020-08-01
Payer: COMMERCIAL

## 2020-08-01 VITALS
BODY MASS INDEX: 39.95 KG/M2 | WEIGHT: 234 LBS | OXYGEN SATURATION: 98 % | TEMPERATURE: 98.6 F | DIASTOLIC BLOOD PRESSURE: 80 MMHG | HEIGHT: 64 IN | SYSTOLIC BLOOD PRESSURE: 124 MMHG | HEART RATE: 76 BPM

## 2020-08-01 DIAGNOSIS — N39.0 URINARY TRACT INFECTION WITHOUT HEMATURIA, SITE UNSPECIFIED: ICD-10-CM

## 2020-08-01 DIAGNOSIS — R10.84 GENERALIZED ABDOMINAL PAIN: Primary | ICD-10-CM

## 2020-08-01 PROCEDURE — 99214 OFFICE O/P EST MOD 30 MIN: CPT | Performed by: FAMILY MEDICINE

## 2020-08-01 PROCEDURE — 3008F BODY MASS INDEX DOCD: CPT | Performed by: FAMILY MEDICINE

## 2020-08-01 PROCEDURE — 1036F TOBACCO NON-USER: CPT | Performed by: FAMILY MEDICINE

## 2020-08-01 NOTE — ASSESSMENT & PLAN NOTE
Patient had been complaining of some urinary frequency and discomfort  Urine dip from July 28th showed leukocytes  Urine culture showed 10,000- 19,000 mixed contaminants  Patient was started on cephalexin on July 28th  She states her symptoms have been improving  Recommend continue to drink plenty of fluids    Call further problems may need to repeat urine culture

## 2020-08-01 NOTE — ASSESSMENT & PLAN NOTE
Patient presents in follow-up from emergency department visit on July 28th due to abdominal pain and UTI  Patient has been having intermittent abdominal pain for months  Workup in emergency department included CT scan (which was negative)  Urinalysis indicated probable UTI  Patient was started on cephalexin  She states that since ED visit, her symptoms have been improving  She has appointment with gastroenterologist on August 20th  Patient is requesting return to work on August 3rd  She is also requesting out of work note from  July 30th and 31st  I reviewed records from emergency department  Including labs, CT scan, urinalysis  Agree with treatment of UTI along with referral to gastroenterology    Will clear to return to work on August 3rd

## 2020-08-01 NOTE — PROGRESS NOTES
[address] [facility]      NAME: George Hanks  AGE: 34 y o  SEX: female  : 1990   MRN: [id number]    DATE: 2020  TIME: 12:34 PM    Assessment and Plan     Problem List Items Addressed This Visit     Generalized abdominal pain - Primary      Patient presents in follow-up from emergency department visit on  due to abdominal pain and UTI  Patient has been having intermittent abdominal pain for months  Workup in emergency department included CT scan (which was negative)  Urinalysis indicated probable UTI  Patient was started on cephalexin  She states that since ED visit, her symptoms have been improving  She has appointment with gastroenterologist on   Patient is requesting return to work on   She is also requesting out of work note from   and   I reviewed records from emergency department  Including labs, CT scan, urinalysis  Agree with treatment of UTI along with referral to gastroenterology  Will clear to return to work on            Urinary tract infection without hematuria      Patient had been complaining of some urinary frequency and discomfort  Urine dip from  showed leukocytes  Urine culture showed 10,000- 19,000 mixed contaminants  Patient was started on cephalexin on   She states her symptoms have been improving  Recommend continue to drink plenty of fluids  Call further problems may need to repeat urine culture              out of work note given today  Patient may return on   She is to call if further problems      Return to office in:  P r n  Chief Complaint     Chief Complaint   Patient presents with    Follow-up     ER visit        History of Present Illness      Patient presents in follow-up from emergency department visit on  due to abdominal pain and UTI  Patient has been having intermittent abdominal pain for months    Workup in emergency department included CT scan (which was negative)  Urinalysis indicated probable UTI  Patient was started on cephalexin  She states that since ED visit, her symptoms have been improving  She has appointment with gastroenterologist on August 20th  Patient is requesting return to work on August 3rd  She is also requesting out of work note from  July 30th and 31st      The following portions of the patient's history were reviewed and updated as appropriate: allergies, current medications, past family history, past medical history, past social history, past surgical history and problem list     Review of Systems   Review of Systems   Respiratory: Negative  Cardiovascular: Negative  Gastrointestinal: Positive for abdominal pain  Genitourinary: Positive for frequency  Active Problem List     Patient Active Problem List   Diagnosis    Generalized abdominal pain    Urinary tract infection without hematuria       Objective   /80 (BP Location: Left arm, Patient Position: Sitting)   Pulse 76   Temp 98 6 °F (37 °C) (Tympanic)   Ht 5' 4 3" (1 633 m)   Wt 106 kg (234 lb)   LMP 07/23/2020   SpO2 98%   BMI 39 79 kg/m²     Physical Exam   Cardiovascular: Normal rate, regular rhythm, normal heart sounds and intact distal pulses  Carotids: no bruits  Ext: no edema   Pulmonary/Chest: Effort normal  No respiratory distress  She has no wheezes  She has no rales  Psychiatric: She has a normal mood and affect   Her behavior is normal  Thought content normal        Pertinent Laboratory/Diagnostic Studies:   ED records reviewed    Current Medications     Current Outpatient Medications:     buPROPion (WELLBUTRIN SR) 150 mg 12 hr tablet, Take 150 mg by mouth 2 (two) times a day, Disp: , Rfl:     cephalexin (KEFLEX) 500 mg capsule, Take 1 capsule (500 mg total) by mouth every 6 (six) hours for 7 days, Disp: 28 capsule, Rfl: 0    Health Maintenance     Health Maintenance   Topic Date Due    HPV Vaccine (2 - Female 3-dose series) 04/16/2007    Hepatitis A Vaccine (2 of 2 - 2-dose series) 09/19/2007    BMI: Followup Plan  10/29/2008    Annual Physical  10/29/2008    Cervical Cancer Screening  10/29/2011    Influenza Vaccine  09/11/2020 (Originally 7/1/2020)    Depression Screening PHQ  07/23/2021    BMI: Adult  08/01/2021    DTaP,Tdap,and Td Vaccines (8 - Td) 03/04/2030    Pneumococcal Vaccine: 65+ Years (1 of 2 - PCV13) 10/29/2055    HIV Screening  Completed    HIB Vaccine  Completed    Hepatitis B Vaccine  Completed    IPV Vaccine  Completed    Meningococcal ACWY Vaccine  Completed    Pneumococcal Vaccine: Pediatrics (0 to 5 Years) and At-Risk Patients (6 to 59 Years)  Aged [employer] History   Administered Date(s) Administered    DTP 01/14/1991, 03/13/1991, 05/24/1991, 03/22/1995    DTaP 06/11/1992    H1N1, All Formulations 11/22/2009    HPV Quadrivalent 03/19/2007    Hep B, Adolescent or Pediatric 07/01/1997, 08/04/1997, 04/24/1998    Hepatitis A 03/19/2007    Hib (PRP-T) 02/13/1991, 04/04/1991, 02/04/1992    INFLUENZA 10/03/2018, 10/03/2018, 11/11/2019    IPV 06/11/1992, 01/14/1997    Influenza TIV (IM) 11/08/2008    MMR 02/04/1992, 03/22/1995    Meningococcal MCV4P 03/19/2007    OPV 01/14/1991, 03/13/1991, 03/22/1995    Tdap 03/19/2007, 03/04/2020    Tuberculin Skin Test-PPD Intradermal 06/21/2002       Caesar Blanco DO  [facility]

## 2020-09-08 ENCOUNTER — OFFICE VISIT (OUTPATIENT)
Dept: FAMILY MEDICINE CLINIC | Facility: CLINIC | Age: 30
End: 2020-09-08
Payer: COMMERCIAL

## 2020-09-08 VITALS
BODY MASS INDEX: 40.53 KG/M2 | DIASTOLIC BLOOD PRESSURE: 82 MMHG | OXYGEN SATURATION: 97 % | SYSTOLIC BLOOD PRESSURE: 114 MMHG | TEMPERATURE: 98.2 F | HEIGHT: 64 IN | HEART RATE: 77 BPM | WEIGHT: 237.4 LBS

## 2020-09-08 DIAGNOSIS — J02.9 PHARYNGITIS, UNSPECIFIED ETIOLOGY: Primary | ICD-10-CM

## 2020-09-08 PROCEDURE — 99213 OFFICE O/P EST LOW 20 MIN: CPT | Performed by: NURSE PRACTITIONER

## 2020-09-08 RX ORDER — AMOXICILLIN AND CLAVULANATE POTASSIUM 875; 125 MG/1; MG/1
1 TABLET, FILM COATED ORAL EVERY 12 HOURS SCHEDULED
Qty: 20 TABLET | Refills: 0 | Status: SHIPPED | OUTPATIENT
Start: 2020-09-08 | End: 2020-09-18

## 2020-09-08 RX ORDER — PREDNISONE 10 MG/1
TABLET ORAL
Qty: 15 TABLET | Refills: 0 | Status: SHIPPED | OUTPATIENT
Start: 2020-09-08 | End: 2021-05-31

## 2020-09-08 NOTE — PROGRESS NOTES
[facility]    ASSESSMENT AND PLAN     1  Pharyngitis, unspecified etiology  Presents today with S/S consistent of pharyngitis, likely strep  Treat today based on clinical findings  Assessment as below  Rx today for 10 days Augmentin, prednisone taper for inflammation  Rx also given for Magic mouthwash, use p r n  Cathiris Song Tylenol as needed  Avoid further NSAIDs while on taper  Maintain good hydration  Continue with warm salt water gargles  Re-evaluate if symptoms change, persist and/or worsen despite treatment    - amoxicillin-clavulanate (Augmentin) 875-125 mg per tablet; Take 1 tablet by mouth every 12 (twelve) hours for 10 days  Dispense: 20 tablet; Refill: 0  - al mag oxide-diphenhydramine-lidocaine viscous (MAGIC MOUTHWASH) 1:1:1 suspension; Swish and spit 10 mL every 4 (four) hours as needed for mouth pain or discomfort  Dispense: 90 mL; Refill: 1  - predniSONE 10 mg tablet; Taper: 50mg (5 tabs) x1 day, 40 mg x1 day, 30 x1, 20 x1, 10 x1  Dispense: 15 tablet; Refill: 0            SUBJECTIVE       Patient ID: Carmenza Lombardo is a 34 y o  female  Chief Complaint   Patient presents with    Sore Throat     Symptoms started yesterday morning    Cough    Earache     Bilateral       HISTORY OF PRESENT ILLNESS    Patient presents today with complaint of sore throat  Symptoms started last evening  Worsening in today  States soreness with swallowing, white patches in the back of her throat, external neck pain extending up into ears  Right ear is starting to feel full  She has taken DayQuil x2 yesterday and NyQuil last night  Has taken nothing today  Was utilizing warm salt water gargles, but finds the warm water irritating  Denies fever    Denies any recent sick contacts        The following portions of the patient's history were reviewed and updated as appropriate: allergies, current medications, past family history, past medical history, past social history, past surgical history and problem list     REVIEW OF SYSTEMS  Review of Systems   Constitutional: Positive for appetite change  Negative for fever  HENT: Positive for postnasal drip and sore throat (white patches)  Negative for dental problem, sinus pressure and sinus pain  Ear pain: fullness  Respiratory: Negative  Cardiovascular: Negative  Gastrointestinal: Negative  Genitourinary: Negative  Neurological: Negative for headaches  Psychiatric/Behavioral: Negative  OBJECTIVE      VITAL SIGNS  /82 (BP Location: Right arm, Patient Position: Sitting, Cuff Size: Large)   Pulse 77   Temp 98 2 °F (36 8 °C)   Ht 5' 4" (1 626 m)   Wt 108 kg (237 lb 6 4 oz)   LMP 08/25/2020 (Exact Date)   SpO2 97%   BMI 40 75 kg/m²     CURRENT MEDICATIONS    Current Outpatient Medications:     buPROPion (WELLBUTRIN SR) 150 mg 12 hr tablet, Take 150 mg by mouth 2 (two) times a day, Disp: , Rfl:     al mag oxide-diphenhydramine-lidocaine viscous (MAGIC MOUTHWASH) 1:1:1 suspension, Swish and spit 10 mL every 4 (four) hours as needed for mouth pain or discomfort, Disp: 90 mL, Rfl: 1    amoxicillin-clavulanate (Augmentin) 875-125 mg per tablet, Take 1 tablet by mouth every 12 (twelve) hours for 10 days, Disp: 20 tablet, Rfl: 0    predniSONE 10 mg tablet, Taper: 50mg (5 tabs) x1 day, 40 mg x1 day, 30 x1, 20 x1, 10 x1, Disp: 15 tablet, Rfl: 0      PHYSICAL EXAMINATION   Physical Exam  Vitals signs and nursing note reviewed  Constitutional:       General: She is not in acute distress  Appearance: She is well-developed  She is not ill-appearing  HENT:      Head: Normocephalic and atraumatic  Right Ear: Hearing, tympanic membrane, ear canal and external ear normal       Left Ear: Hearing, tympanic membrane, ear canal and external ear normal       Nose: Rhinorrhea present  Right Turbinates: Swollen  Left Turbinates: Swollen  Right Sinus: No maxillary sinus tenderness or frontal sinus tenderness        Left Sinus: No maxillary sinus tenderness or frontal sinus tenderness  Mouth/Throat:      Pharynx: Pharyngeal swelling, oropharyngeal exudate and posterior oropharyngeal erythema present  No uvula swelling  Tonsils: Tonsillar exudate and tonsillar abscess present  3+ on the right  3+ on the left  Eyes:      Conjunctiva/sclera: Conjunctivae normal    Neck:      Musculoskeletal: Full passive range of motion without pain  Cardiovascular:      Rate and Rhythm: Normal rate and regular rhythm  Heart sounds: Normal heart sounds  Lymphadenopathy:      Head:      Right side of head: No submandibular or tonsillar adenopathy  Left side of head: No submandibular or tonsillar adenopathy  Cervical: No cervical adenopathy  Comments: Lymph nodes not enlarged but tender to plapation   Skin:     General: Skin is warm and dry  Neurological:      Mental Status: She is alert and oriented to person, place, and time     Psychiatric:         Attention and Perception: Attention and perception normal          Mood and Affect: Mood and affect normal          Speech: Speech normal          Behavior: Behavior normal          Judgment: Judgment normal

## 2021-01-06 ENCOUNTER — TELEPHONE (OUTPATIENT)
Dept: GASTROENTEROLOGY | Facility: MEDICAL CENTER | Age: 31
End: 2021-01-06

## 2021-01-06 NOTE — TELEPHONE ENCOUNTER
Spoke to patient, she does not have health insurance yet, will call back herself to schedule down the line when she figures health insurance out

## 2021-04-13 ENCOUNTER — OCCMED (OUTPATIENT)
Dept: URGENT CARE | Facility: CLINIC | Age: 31
End: 2021-04-13

## 2021-04-13 ENCOUNTER — TRANSCRIBE ORDERS (OUTPATIENT)
Dept: ADMINISTRATIVE | Facility: HOSPITAL | Age: 31
End: 2021-04-13

## 2021-04-13 ENCOUNTER — APPOINTMENT (OUTPATIENT)
Dept: LAB | Facility: CLINIC | Age: 31
End: 2021-04-13

## 2021-04-13 DIAGNOSIS — Z02.1 PHYSICAL EXAM, PRE-EMPLOYMENT: ICD-10-CM

## 2021-04-13 DIAGNOSIS — Z02.1 PRE-EMPLOYMENT EXAMINATION: Primary | ICD-10-CM

## 2021-04-13 DIAGNOSIS — Z02.1 PHYSICAL EXAM, PRE-EMPLOYMENT: Primary | ICD-10-CM

## 2021-04-13 LAB — RUBV IGG SERPL IA-ACNC: >175 IU/ML

## 2021-04-13 PROCEDURE — 86762 RUBELLA ANTIBODY: CPT

## 2021-04-13 PROCEDURE — 86735 MUMPS ANTIBODY: CPT

## 2021-04-13 PROCEDURE — 86480 TB TEST CELL IMMUN MEASURE: CPT

## 2021-04-13 PROCEDURE — 36415 COLL VENOUS BLD VENIPUNCTURE: CPT

## 2021-04-13 PROCEDURE — 86787 VARICELLA-ZOSTER ANTIBODY: CPT

## 2021-04-13 PROCEDURE — 86765 RUBEOLA ANTIBODY: CPT

## 2021-04-15 LAB
GAMMA INTERFERON BACKGROUND BLD IA-ACNC: 0.03 IU/ML
M TB IFN-G BLD-IMP: NEGATIVE
M TB IFN-G CD4+ BCKGRND COR BLD-ACNC: 0 IU/ML
M TB IFN-G CD4+ BCKGRND COR BLD-ACNC: 0 IU/ML
MEV IGG SER QL: NORMAL
MITOGEN IGNF BCKGRD COR BLD-ACNC: >10 IU/ML
MUV IGG SER QL: NORMAL
VZV IGG SER IA-ACNC: NORMAL

## 2021-05-31 ENCOUNTER — HOSPITAL ENCOUNTER (EMERGENCY)
Facility: HOSPITAL | Age: 31
Discharge: HOME | End: 2021-05-31
Attending: EMERGENCY MEDICINE | Admitting: EMERGENCY MEDICINE
Payer: COMMERCIAL

## 2021-05-31 ENCOUNTER — APPOINTMENT (EMERGENCY)
Dept: CT IMAGING | Facility: HOSPITAL | Age: 31
End: 2021-05-31
Payer: COMMERCIAL

## 2021-05-31 VITALS
WEIGHT: 223 LBS | DIASTOLIC BLOOD PRESSURE: 64 MMHG | SYSTOLIC BLOOD PRESSURE: 115 MMHG | BODY MASS INDEX: 38.28 KG/M2 | TEMPERATURE: 98.3 F | OXYGEN SATURATION: 97 % | RESPIRATION RATE: 18 BRPM | HEART RATE: 82 BPM

## 2021-05-31 DIAGNOSIS — M54.50 LOW BACK PAIN: ICD-10-CM

## 2021-05-31 DIAGNOSIS — R10.9 NONSPECIFIC ABDOMINAL PAIN: Primary | ICD-10-CM

## 2021-05-31 LAB
ABO GROUP BLD: NORMAL
ABO GROUP BLD: NORMAL
ALBUMIN SERPL BCP-MCNC: 3.8 G/DL (ref 3.5–5)
ALP SERPL-CCNC: 61 U/L (ref 46–116)
ALT SERPL W P-5'-P-CCNC: 28 U/L (ref 12–78)
ANION GAP SERPL CALCULATED.3IONS-SCNC: 10 MMOL/L (ref 4–13)
AST SERPL W P-5'-P-CCNC: 14 U/L (ref 5–45)
B-HCG SERPL-ACNC: <2 MIU/ML
BACTERIA UR QL AUTO: ABNORMAL /HPF
BASOPHILS # BLD AUTO: 0.03 THOUSANDS/ΜL (ref 0–0.1)
BASOPHILS NFR BLD AUTO: 0 % (ref 0–1)
BILIRUB SERPL-MCNC: 0.41 MG/DL (ref 0.2–1)
BILIRUB UR QL STRIP: NEGATIVE
BLD GP AB SCN SERPL QL: NEGATIVE
BUN SERPL-MCNC: 10 MG/DL (ref 5–25)
CALCIUM SERPL-MCNC: 9.4 MG/DL (ref 8.3–10.1)
CHLORIDE SERPL-SCNC: 106 MMOL/L (ref 100–108)
CLARITY UR: CLEAR
CO2 SERPL-SCNC: 27 MMOL/L (ref 21–32)
COLOR UR: ABNORMAL
CREAT SERPL-MCNC: 0.7 MG/DL (ref 0.6–1.3)
EOSINOPHIL # BLD AUTO: 0.08 THOUSAND/ΜL (ref 0–0.61)
EOSINOPHIL NFR BLD AUTO: 1 % (ref 0–6)
ERYTHROCYTE [DISTWIDTH] IN BLOOD BY AUTOMATED COUNT: 13 % (ref 11.6–15.1)
EXT PREG TEST URINE: NEGATIVE
EXT. CONTROL ED NAV: NORMAL
GFR SERPL CREATININE-BSD FRML MDRD: 117 ML/MIN/1.73SQ M
GLUCOSE SERPL-MCNC: 88 MG/DL (ref 65–140)
GLUCOSE UR STRIP-MCNC: NEGATIVE MG/DL
HCG SERPL QL: NEGATIVE
HCT VFR BLD AUTO: 42 % (ref 34.8–46.1)
HGB BLD-MCNC: 14.5 G/DL (ref 11.5–15.4)
HGB UR QL STRIP.AUTO: ABNORMAL
IMM GRANULOCYTES # BLD AUTO: 0.06 THOUSAND/UL (ref 0–0.2)
IMM GRANULOCYTES NFR BLD AUTO: 1 % (ref 0–2)
KETONES UR STRIP-MCNC: NEGATIVE MG/DL
LEUKOCYTE ESTERASE UR QL STRIP: NEGATIVE
LYMPHOCYTES # BLD AUTO: 2.4 THOUSANDS/ΜL (ref 0.6–4.47)
LYMPHOCYTES NFR BLD AUTO: 23 % (ref 14–44)
MCH RBC QN AUTO: 30.6 PG (ref 26.8–34.3)
MCHC RBC AUTO-ENTMCNC: 34.5 G/DL (ref 31.4–37.4)
MCV RBC AUTO: 89 FL (ref 82–98)
MONOCYTES # BLD AUTO: 0.52 THOUSAND/ΜL (ref 0.17–1.22)
MONOCYTES NFR BLD AUTO: 5 % (ref 4–12)
NEUTROPHILS # BLD AUTO: 7.42 THOUSANDS/ΜL (ref 1.85–7.62)
NEUTS SEG NFR BLD AUTO: 70 % (ref 43–75)
NITRITE UR QL STRIP: NEGATIVE
NON-SQ EPI CELLS URNS QL MICRO: ABNORMAL /HPF
NRBC BLD AUTO-RTO: 0 /100 WBCS
PH UR STRIP.AUTO: 5.5 [PH] (ref 4.5–8)
PLATELET # BLD AUTO: 337 THOUSANDS/UL (ref 149–390)
PMV BLD AUTO: 9.2 FL (ref 8.9–12.7)
POTASSIUM SERPL-SCNC: 3.8 MMOL/L (ref 3.5–5.3)
PROT SERPL-MCNC: 7.4 G/DL (ref 6.4–8.2)
PROT UR STRIP-MCNC: ABNORMAL MG/DL
RBC # BLD AUTO: 4.74 MILLION/UL (ref 3.81–5.12)
RBC #/AREA URNS AUTO: ABNORMAL /HPF
RH BLD: POSITIVE
RH BLD: POSITIVE
SODIUM SERPL-SCNC: 143 MMOL/L (ref 136–145)
SP GR UR STRIP.AUTO: >=1.03 (ref 1–1.03)
SPECIMEN EXPIRATION DATE: NORMAL
UROBILINOGEN UR QL STRIP.AUTO: 0.2 E.U./DL
WBC # BLD AUTO: 10.51 THOUSAND/UL (ref 4.31–10.16)
WBC #/AREA URNS AUTO: ABNORMAL /HPF

## 2021-05-31 PROCEDURE — 99284 EMERGENCY DEPT VISIT MOD MDM: CPT

## 2021-05-31 PROCEDURE — 86901 BLOOD TYPING SEROLOGIC RH(D): CPT | Performed by: EMERGENCY MEDICINE

## 2021-05-31 PROCEDURE — 87086 URINE CULTURE/COLONY COUNT: CPT

## 2021-05-31 PROCEDURE — 81001 URINALYSIS AUTO W/SCOPE: CPT

## 2021-05-31 PROCEDURE — 86850 RBC ANTIBODY SCREEN: CPT | Performed by: EMERGENCY MEDICINE

## 2021-05-31 PROCEDURE — G1004 CDSM NDSC: HCPCS

## 2021-05-31 PROCEDURE — 84703 CHORIONIC GONADOTROPIN ASSAY: CPT | Performed by: EMERGENCY MEDICINE

## 2021-05-31 PROCEDURE — 87591 N.GONORRHOEAE DNA AMP PROB: CPT | Performed by: EMERGENCY MEDICINE

## 2021-05-31 PROCEDURE — 86900 BLOOD TYPING SEROLOGIC ABO: CPT | Performed by: EMERGENCY MEDICINE

## 2021-05-31 PROCEDURE — 74177 CT ABD & PELVIS W/CONTRAST: CPT

## 2021-05-31 PROCEDURE — 84702 CHORIONIC GONADOTROPIN TEST: CPT | Performed by: EMERGENCY MEDICINE

## 2021-05-31 PROCEDURE — 81025 URINE PREGNANCY TEST: CPT | Performed by: EMERGENCY MEDICINE

## 2021-05-31 PROCEDURE — 96374 THER/PROPH/DIAG INJ IV PUSH: CPT

## 2021-05-31 PROCEDURE — 99283 EMERGENCY DEPT VISIT LOW MDM: CPT | Performed by: EMERGENCY MEDICINE

## 2021-05-31 PROCEDURE — 85025 COMPLETE CBC W/AUTO DIFF WBC: CPT | Performed by: EMERGENCY MEDICINE

## 2021-05-31 PROCEDURE — 36415 COLL VENOUS BLD VENIPUNCTURE: CPT | Performed by: EMERGENCY MEDICINE

## 2021-05-31 PROCEDURE — 80053 COMPREHEN METABOLIC PANEL: CPT | Performed by: EMERGENCY MEDICINE

## 2021-05-31 PROCEDURE — 96361 HYDRATE IV INFUSION ADD-ON: CPT

## 2021-05-31 PROCEDURE — 96375 TX/PRO/DX INJ NEW DRUG ADDON: CPT

## 2021-05-31 PROCEDURE — 87491 CHLMYD TRACH DNA AMP PROBE: CPT | Performed by: EMERGENCY MEDICINE

## 2021-05-31 RX ORDER — ONDANSETRON 2 MG/ML
4 INJECTION INTRAMUSCULAR; INTRAVENOUS ONCE
Status: COMPLETED | OUTPATIENT
Start: 2021-05-31 | End: 2021-05-31

## 2021-05-31 RX ORDER — DIAZEPAM 5 MG/1
5 TABLET ORAL 2 TIMES DAILY
Qty: 20 TABLET | Refills: 0 | Status: SHIPPED | OUTPATIENT
Start: 2021-05-31 | End: 2021-12-22 | Stop reason: HOSPADM

## 2021-05-31 RX ORDER — MORPHINE SULFATE 4 MG/ML
4 INJECTION, SOLUTION INTRAMUSCULAR; INTRAVENOUS ONCE
Status: DISCONTINUED | OUTPATIENT
Start: 2021-05-31 | End: 2021-05-31 | Stop reason: HOSPADM

## 2021-05-31 RX ORDER — DIAZEPAM 5 MG/ML
5 INJECTION, SOLUTION INTRAMUSCULAR; INTRAVENOUS ONCE
Status: COMPLETED | OUTPATIENT
Start: 2021-05-31 | End: 2021-05-31

## 2021-05-31 RX ORDER — ACETAMINOPHEN 325 MG/1
650 TABLET ORAL EVERY 4 HOURS PRN
Qty: 20 TABLET | Refills: 0 | Status: SHIPPED | OUTPATIENT
Start: 2021-05-31 | End: 2022-06-06

## 2021-05-31 RX ADMIN — ONDANSETRON 4 MG: 2 INJECTION INTRAMUSCULAR; INTRAVENOUS at 18:42

## 2021-05-31 RX ADMIN — DIAZEPAM 5 MG: 10 INJECTION, SOLUTION INTRAMUSCULAR; INTRAVENOUS at 20:29

## 2021-05-31 RX ADMIN — SODIUM CHLORIDE 1000 ML: 0.9 INJECTION, SOLUTION INTRAVENOUS at 18:42

## 2021-05-31 RX ADMIN — IOHEXOL 100 ML: 350 INJECTION, SOLUTION INTRAVENOUS at 20:42

## 2021-05-31 NOTE — ED PROVIDER NOTES
History  Chief Complaint   Patient presents with    Pelvic Pain - Pregnant     Pt  reports positive home pregnancy test last month, pelvic pain and lower back pain started yesterday and bleeding started this morning  Tubal ligation 6 years ago  Also reports nausea and vomiting  Has obgyn appt  in 2 weeks  C/o lower pelvic pain and back pain for the past 2 days  LMP was mid March, but pt  Had a tubal ligation 6 years ago  She took 2 pregnancy tests that were positive  No urinary symptoms  No fevers, no n/v/d          None       Past Medical History:   Diagnosis Date    Anxiety     CMTD (Charcot-Rosy-Tooth disease)     Depression     Migraines     Mitral valve prolapse        Past Surgical History:   Procedure Laterality Date    ANKLE SURGERY      DILATION AND CURETTAGE OF UTERUS      TUBAL LIGATION      WISDOM TOOTH EXTRACTION         Family History   Problem Relation Age of Onset    Heart disease Mother     No Known Problems Father      I have reviewed and agree with the history as documented  E-Cigarette/Vaping    E-Cigarette Use Never User      E-Cigarette/Vaping Substances    Nicotine No     THC No     CBD No     Flavoring No     Other No     Unknown No      Social History     Tobacco Use    Smoking status: Never Smoker    Smokeless tobacco: Never Used   Substance Use Topics    Alcohol use: Not Currently     Frequency: Monthly or less     Drinks per session: 1 or 2     Binge frequency: Never    Drug use: Yes     Types: Marijuana     Comment: medical card       Review of Systems   Constitutional: Negative for appetite change, fatigue and fever  HENT: Negative for rhinorrhea and sore throat  Eyes: Negative for pain  Respiratory: Negative for cough, shortness of breath and wheezing  Cardiovascular: Negative for chest pain and leg swelling  Gastrointestinal: Positive for abdominal pain  Negative for diarrhea, nausea and vomiting     Genitourinary: Negative for dysuria, flank pain, vaginal bleeding and vaginal discharge  Musculoskeletal: Negative for back pain and neck pain  Skin: Negative for rash  Neurological: Negative for syncope and headaches  Psychiatric/Behavioral:        Mood normal       Physical Exam  Physical Exam  Vitals signs and nursing note reviewed  Constitutional:       Appearance: She is well-developed  HENT:      Head: Normocephalic and atraumatic  Neck:      Musculoskeletal: Normal range of motion and neck supple  Cardiovascular:      Rate and Rhythm: Normal rate and regular rhythm  Pulmonary:      Effort: Pulmonary effort is normal  No respiratory distress  Breath sounds: Normal breath sounds  Abdominal:      Palpations: Abdomen is soft  Comments: Lower abd  Tenderness , no r/g   Musculoskeletal: Normal range of motion  Comments: Lower lumbar tenderness, no point spinal tenderness   Skin:     General: Skin is warm and dry  Neurological:      Mental Status: She is alert and oriented to person, place, and time           Vital Signs  ED Triage Vitals   Temperature Pulse Respirations Blood Pressure SpO2   05/31/21 1809 05/31/21 1809 05/31/21 1809 05/31/21 1809 05/31/21 1809   98 3 °F (36 8 °C) 89 16 (!) 160/108 97 %      Temp Source Heart Rate Source Patient Position - Orthostatic VS BP Location FiO2 (%)   05/31/21 1809 05/31/21 2119 05/31/21 2119 05/31/21 2119 --   Oral Monitor Sitting Right arm       Pain Score       05/31/21 1809       7           Vitals:    05/31/21 1809 05/31/21 2119   BP: (!) 160/108 115/64   Pulse: 89 82   Patient Position - Orthostatic VS:  Sitting         Visual Acuity      ED Medications  Medications   sodium chloride 0 9 % bolus 1,000 mL (0 mL Intravenous Stopped 5/31/21 2117)   ondansetron (ZOFRAN) injection 4 mg (4 mg Intravenous Given 5/31/21 1842)   diazepam (VALIUM) injection 5 mg (5 mg Intravenous Given 5/31/21 2029)   iohexol (OMNIPAQUE) 350 MG/ML injection (SINGLE-DOSE) 100 mL (100 mL Intravenous Given 5/31/21 2042)       Diagnostic Studies  Results Reviewed     Procedure Component Value Units Date/Time    Chlamydia/GC amplified DNA by PCR [[id number]]  (Abnormal) Collected: 05/31/21 2130    Lab Status: Final result Specimen: Urine, Other Updated: 06/02/21 0549     N gonorrhoeae, DNA Probe Invalid     Chlamydia trachomatis, DNA Probe Invalid    Narrative:      Test performed using PCR amplification of target DNA  This test is intended as an aid in the diagnosis of Chlamydial and gonococcal disease  This test has not been evaluated in patients younger than 15years of age and is not recommended for evaluation of suspected sexual abuse  Additional testing is recommended when the results do not correlate with clinical signs and symptoms        Urine culture [[id number]] Collected: 05/31/21 2001    Lab Status: Final result Specimen: Urine, Clean Catch Updated: 06/01/21 1749     Urine Culture 30,000-39,000 cfu/ml     Urine Microscopic [[id number]]  (Abnormal) Collected: 05/31/21 2001    Lab Status: Final result Specimen: Urine, Clean Catch Updated: 05/31/21 2054     RBC, UA Innumerable /hpf      WBC, UA 10-20 /hpf      Epithelial Cells Occasional /hpf      Bacteria, UA Moderate /hpf     hCG, qualitative pregnancy [[id number]]  (Normal) Collected: 05/31/21 1840    Lab Status: Final result Specimen: Blood from Arm, Right Updated: 05/31/21 2036     Preg, Serum Negative    POCT pregnancy, urine [[id number]]  (Normal) Resulted: 05/31/21 2006    Lab Status: Final result Updated: 05/31/21 2006     EXT PREG TEST UR (Ref: Negative) NEGATIVE     Control Valid    Urine Macroscopic, POC [[id number]]  (Abnormal) Collected: 05/31/21 2001    Lab Status: Final result Specimen: Urine Updated: 05/31/21 2002     Color, UA Bloody     Clarity, UA Clear     pH, UA 5 5     Leukocytes, UA Negative     Nitrite, UA Negative     Protein,  (2+) mg/dl      Glucose, UA Negative mg/dl      Ketones, UA Negative mg/dl Urobilinogen, UA 0 2 E U /dl      Bilirubin, UA Negative     Blood, UA Large     Specific Gravity, UA >=1 030    Narrative:      CLINITEK RESULT    Quantitative hCG [[id number]]  (Normal) Collected: 05/31/21 1840    Lab Status: Final result Specimen: Blood from Arm, Right Updated: 05/31/21 1917     HCG, Quant <2 mIU/mL     Narrative:       Expected Ranges:     Approximate               Approximate HCG  Gestation age          Concentration ( mIU/mL)  _____________          ______________________   Nena Thomas                      HCG values  0 2-1                       5-50  1-2                           2-3                         100-5000  3-4                         500-44623  4-5                         1000-27942  5-6                         [id number]  6-8                         [id number]  8-12                        [id number]      Comprehensive metabolic panel [[id number]] Collected: 05/31/21 1840    Lab Status: Final result Specimen: Blood from Arm, Right Updated: 05/31/21 1910     Sodium 143 mmol/L      Potassium 3 8 mmol/L      Chloride 106 mmol/L      CO2 27 mmol/L      ANION GAP 10 mmol/L      BUN 10 mg/dL      Creatinine 0 70 mg/dL      Glucose 88 mg/dL      Calcium 9 4 mg/dL      AST 14 U/L      ALT 28 U/L      Alkaline Phosphatase 61 U/L      Total Protein 7 4 g/dL      Albumin 3 8 g/dL      Total Bilirubin 0 41 mg/dL      eGFR 117 ml/min/1 73sq m     Narrative:      Rashawn guidelines for Chronic Kidney Disease (CKD):     Stage 1 with normal or high GFR (GFR > 90 mL/min/1 73 square meters)    Stage 2 Mild CKD (GFR = 60-89 mL/min/1 73 square meters)    Stage 3A Moderate CKD (GFR = 45-59 mL/min/1 73 square meters)    Stage 3B Moderate CKD (GFR = 30-44 mL/min/1 73 square meters)    Stage 4 Severe CKD (GFR = 15-29 mL/min/1 73 square meters)    Stage 5 End Stage CKD (GFR <15 mL/min/1 73 square meters)  Note: GFR calculation is accurate only with a steady state creatinine    CBC and differential [[id number]]  (Abnormal) Collected: 05/31/21 1840    Lab Status: Final result Specimen: Blood from Arm, Right Updated: 05/31/21 1849     WBC 10 51 Thousand/uL      RBC 4 74 Million/uL      Hemoglobin 14 5 g/dL      Hematocrit 42 0 %      MCV 89 fL      MCH 30 6 pg      MCHC 34 5 g/dL      RDW 13 0 %      MPV 9 2 fL      Platelets 030 Thousands/uL      nRBC 0 /100 WBCs      Neutrophils Relative 70 %      Immat GRANS % 1 %      Lymphocytes Relative 23 %      Monocytes Relative 5 %      Eosinophils Relative 1 %      Basophils Relative 0 %      Neutrophils Absolute 7 42 Thousands/µL      Immature Grans Absolute 0 06 Thousand/uL      Lymphocytes Absolute 2 40 Thousands/µL      Monocytes Absolute 0 52 Thousand/µL      Eosinophils Absolute 0 08 Thousand/µL      Basophils Absolute 0 03 Thousands/µL                  CT abdomen pelvis with contrast   Final Result by Olena Rodriguez MD (05/31 2106)      No acute intra-abdominal abnormality  Workstation performed: [id number]                    Procedures  Procedures         ED Course                                           MDM  Number of Diagnoses or Management Options  Low back pain:   Nonspecific abdominal pain:      Amount and/or Complexity of Data Reviewed  Clinical lab tests: ordered and reviewed  Tests in the radiology section of CPT®: ordered and reviewed    Risk of Complications, Morbidity, and/or Mortality  Presenting problems: moderate  General comments: Pt  Felt better after meds given here  CT and labs reviewed with the pt          Disposition  Final diagnoses:   Nonspecific abdominal pain   Low back pain     Time reflects when diagnosis was documented in both MDM as applicable and the Disposition within this note     Time User Action Codes Description Comment    5/31/2021  9:27 PM Alonso Hirsch Add [R10 9] Nonspecific abdominal pain     5/31/2021  9:27 PM Alonso Hirsch Add [M54 5] Low back pain ED Disposition     ED Disposition Condition Date/Time Comment    Discharge Stable Mon May 31, 2021  9:27 PM Nitesh May discharge to home/self care  Follow-up Information     Follow up With Specialties Details Why Contact Info Additional Matt 11, DO Family Medicine   [address]  [address] [facility]  [phone] [address]  Obstetrics and Gynecology   [address] [facility] [address] [id number]  [facility], [address] [place] [address], [id number] [phone]          Discharge Medication List as of 5/31/2021  9:29 PM      START taking these medications    Details   acetaminophen (TYLENOL) 325 mg tablet Take 2 tablets (650 mg total) by mouth every 4 (four) hours as needed for mild pain, Starting Mon 5/31/2021, Normal      diazepam (VALIUM) 5 mg tablet Take 1 tablet (5 mg total) by mouth 2 (two) times a day for 10 days, Starting Mon 5/31/2021, Until u 6/10/2021, Normal           No discharge procedures on file      PDMP Review     None          ED Provider  Electronically Signed by           Araceli Dunn MD  06/02/21 6524

## 2021-05-31 NOTE — ED NOTES
Pt advised no need to urinate at this time  Advised to use call button when pt feels the need       Evelin Tafoya  05/31/21 2947

## 2021-06-01 LAB — BACTERIA UR CULT: NORMAL

## 2021-06-02 LAB
C TRACH DNA SPEC QL NAA+PROBE: ABNORMAL
N GONORRHOEA DNA SPEC QL NAA+PROBE: ABNORMAL

## 2021-07-07 ENCOUNTER — APPOINTMENT (OUTPATIENT)
Dept: URGENT CARE | Facility: MEDICAL CENTER | Age: 31
End: 2021-07-07
Payer: OTHER MISCELLANEOUS

## 2021-07-07 PROCEDURE — G0382 LEV 3 HOSP TYPE B ED VISIT: HCPCS | Performed by: PHYSICIAN ASSISTANT

## 2021-07-07 PROCEDURE — 99283 EMERGENCY DEPT VISIT LOW MDM: CPT | Performed by: PHYSICIAN ASSISTANT

## 2021-07-20 ENCOUNTER — TELEPHONE (OUTPATIENT)
Dept: OBGYN CLINIC | Facility: MEDICAL CENTER | Age: 31
End: 2021-07-20

## 2021-08-09 ENCOUNTER — TELEPHONE (OUTPATIENT)
Dept: PSYCHIATRY | Facility: CLINIC | Age: 31
End: 2021-08-09

## 2021-08-29 ENCOUNTER — APPOINTMENT (EMERGENCY)
Dept: RADIOLOGY | Facility: HOSPITAL | Age: 31
End: 2021-08-29
Payer: COMMERCIAL

## 2021-08-29 ENCOUNTER — HOSPITAL ENCOUNTER (EMERGENCY)
Facility: HOSPITAL | Age: 31
Discharge: HOME | End: 2021-08-29
Attending: EMERGENCY MEDICINE | Admitting: EMERGENCY MEDICINE
Payer: COMMERCIAL

## 2021-08-29 ENCOUNTER — NURSE TRIAGE (OUTPATIENT)
Dept: OTHER | Facility: OTHER | Age: 31
End: 2021-08-29

## 2021-08-29 VITALS
SYSTOLIC BLOOD PRESSURE: 109 MMHG | RESPIRATION RATE: 14 BRPM | TEMPERATURE: 98.2 F | DIASTOLIC BLOOD PRESSURE: 78 MMHG | OXYGEN SATURATION: 97 % | HEART RATE: 60 BPM

## 2021-08-29 DIAGNOSIS — T50.Z95A ADVERSE EFFECT OF VACCINE, INITIAL ENCOUNTER: ICD-10-CM

## 2021-08-29 DIAGNOSIS — G43.909 MIGRAINE HEADACHE: Primary | ICD-10-CM

## 2021-08-29 LAB
ALBUMIN SERPL BCP-MCNC: 3.7 G/DL (ref 3.5–5)
ALP SERPL-CCNC: 62 U/L (ref 46–116)
ALT SERPL W P-5'-P-CCNC: 27 U/L (ref 12–78)
ANION GAP SERPL CALCULATED.3IONS-SCNC: 9 MMOL/L (ref 4–13)
AST SERPL W P-5'-P-CCNC: 14 U/L (ref 5–45)
BASOPHILS # BLD AUTO: 0.04 THOUSANDS/ΜL (ref 0–0.1)
BASOPHILS NFR BLD AUTO: 1 % (ref 0–1)
BILIRUB SERPL-MCNC: 0.34 MG/DL (ref 0.2–1)
BUN SERPL-MCNC: 12 MG/DL (ref 5–25)
CALCIUM SERPL-MCNC: 9 MG/DL (ref 8.3–10.1)
CHLORIDE SERPL-SCNC: 108 MMOL/L (ref 100–108)
CO2 SERPL-SCNC: 27 MMOL/L (ref 21–32)
CREAT SERPL-MCNC: 0.72 MG/DL (ref 0.6–1.3)
D DIMER PPP FEU-MCNC: 0.5 UG/ML FEU
EOSINOPHIL # BLD AUTO: 0.07 THOUSAND/ΜL (ref 0–0.61)
EOSINOPHIL NFR BLD AUTO: 1 % (ref 0–6)
ERYTHROCYTE [DISTWIDTH] IN BLOOD BY AUTOMATED COUNT: 13.2 % (ref 11.6–15.1)
EXT PREG TEST URINE: NEGATIVE
EXT. CONTROL ED NAV: NORMAL
FLUAV RNA RESP QL NAA+PROBE: NEGATIVE
FLUBV RNA RESP QL NAA+PROBE: NEGATIVE
GFR SERPL CREATININE-BSD FRML MDRD: 113 ML/MIN/1.73SQ M
GLUCOSE SERPL-MCNC: 79 MG/DL (ref 65–140)
HCT VFR BLD AUTO: 40.9 % (ref 34.8–46.1)
HGB BLD-MCNC: 14 G/DL (ref 11.5–15.4)
IMM GRANULOCYTES # BLD AUTO: 0.04 THOUSAND/UL (ref 0–0.2)
IMM GRANULOCYTES NFR BLD AUTO: 1 % (ref 0–2)
LYMPHOCYTES # BLD AUTO: 2.21 THOUSANDS/ΜL (ref 0.6–4.47)
LYMPHOCYTES NFR BLD AUTO: 36 % (ref 14–44)
MAGNESIUM SERPL-MCNC: 2 MG/DL (ref 1.6–2.6)
MCH RBC QN AUTO: 30.3 PG (ref 26.8–34.3)
MCHC RBC AUTO-ENTMCNC: 34.2 G/DL (ref 31.4–37.4)
MCV RBC AUTO: 89 FL (ref 82–98)
MONOCYTES # BLD AUTO: 0.52 THOUSAND/ΜL (ref 0.17–1.22)
MONOCYTES NFR BLD AUTO: 8 % (ref 4–12)
NEUTROPHILS # BLD AUTO: 3.35 THOUSANDS/ΜL (ref 1.85–7.62)
NEUTS SEG NFR BLD AUTO: 53 % (ref 43–75)
NRBC BLD AUTO-RTO: 0 /100 WBCS
PLATELET # BLD AUTO: 333 THOUSANDS/UL (ref 149–390)
PMV BLD AUTO: 9.1 FL (ref 8.9–12.7)
POTASSIUM SERPL-SCNC: 4 MMOL/L (ref 3.5–5.3)
PROT SERPL-MCNC: 7.6 G/DL (ref 6.4–8.2)
RBC # BLD AUTO: 4.62 MILLION/UL (ref 3.81–5.12)
RSV RNA RESP QL NAA+PROBE: NEGATIVE
SARS-COV-2 RNA RESP QL NAA+PROBE: NEGATIVE
SODIUM SERPL-SCNC: 144 MMOL/L (ref 136–145)
TROPONIN I SERPL-MCNC: <0.02 NG/ML
WBC # BLD AUTO: 6.23 THOUSAND/UL (ref 4.31–10.16)

## 2021-08-29 PROCEDURE — 71045 X-RAY EXAM CHEST 1 VIEW: CPT

## 2021-08-29 PROCEDURE — 85379 FIBRIN DEGRADATION QUANT: CPT | Performed by: EMERGENCY MEDICINE

## 2021-08-29 PROCEDURE — 99285 EMERGENCY DEPT VISIT HI MDM: CPT | Performed by: EMERGENCY MEDICINE

## 2021-08-29 PROCEDURE — 0241U HB NFCT DS VIR RESP RNA 4 TRGT: CPT | Performed by: EMERGENCY MEDICINE

## 2021-08-29 PROCEDURE — 80053 COMPREHEN METABOLIC PANEL: CPT | Performed by: EMERGENCY MEDICINE

## 2021-08-29 PROCEDURE — 85025 COMPLETE CBC W/AUTO DIFF WBC: CPT | Performed by: EMERGENCY MEDICINE

## 2021-08-29 PROCEDURE — 93005 ELECTROCARDIOGRAM TRACING: CPT

## 2021-08-29 PROCEDURE — 36415 COLL VENOUS BLD VENIPUNCTURE: CPT | Performed by: EMERGENCY MEDICINE

## 2021-08-29 PROCEDURE — 83735 ASSAY OF MAGNESIUM: CPT | Performed by: EMERGENCY MEDICINE

## 2021-08-29 PROCEDURE — 84484 ASSAY OF TROPONIN QUANT: CPT | Performed by: EMERGENCY MEDICINE

## 2021-08-29 PROCEDURE — 81025 URINE PREGNANCY TEST: CPT | Performed by: EMERGENCY MEDICINE

## 2021-08-29 PROCEDURE — 99284 EMERGENCY DEPT VISIT MOD MDM: CPT

## 2021-08-29 PROCEDURE — 96365 THER/PROPH/DIAG IV INF INIT: CPT

## 2021-08-29 PROCEDURE — 96375 TX/PRO/DX INJ NEW DRUG ADDON: CPT

## 2021-08-29 RX ORDER — MAGNESIUM SULFATE HEPTAHYDRATE 40 MG/ML
2 INJECTION, SOLUTION INTRAVENOUS ONCE
Status: COMPLETED | OUTPATIENT
Start: 2021-08-29 | End: 2021-08-29

## 2021-08-29 RX ORDER — KETOROLAC TROMETHAMINE 30 MG/ML
30 INJECTION, SOLUTION INTRAMUSCULAR; INTRAVENOUS ONCE
Status: COMPLETED | OUTPATIENT
Start: 2021-08-29 | End: 2021-08-29

## 2021-08-29 RX ADMIN — KETOROLAC TROMETHAMINE 30 MG: 30 INJECTION, SOLUTION INTRAMUSCULAR at 15:45

## 2021-08-29 RX ADMIN — SODIUM CHLORIDE 1000 ML: 0.9 INJECTION, SOLUTION INTRAVENOUS at 15:45

## 2021-08-29 RX ADMIN — MAGNESIUM SULFATE IN WATER 2 G: 40 INJECTION, SOLUTION INTRAVENOUS at 15:45

## 2021-08-29 NOTE — ED PROVIDER NOTES
History  Chief Complaint   Patient presents with    Medical Problem     Pt presents to the ED with fevers and headache since receiving COVID vaccine this past Thursday 8/26  Pt reports vomiting yesterday, ear pain       History provided by:  Patient   used: No      80-year-old female presented for evaluation migraine headache, some nausea, vomiting, fullness in the left ear, dizziness, loss of appetite, syncopal episode yesterday  Reports her children found her unconscious on the bathroom floor  Her 10year-old and 6year-old daughters confirm the story  They report that when they shook her she woke up  She denies any injury  Headache was present before the syncope  Currently pain moderate, diffuse, without improvement from OTC meds  Reports a long history of migraines and has not had successful treatment in the past with multiple medications in conjunction with neurologist   She reports having her 1st COVID vaccination 3 days ago and symptoms began after that  She does report having a fever yesterday  Slight cough, mild dyspnea  Vitals normal here  No focal neurologic deficits  Plan EKG, chest x-ray, labs, COVID testing and will symptomatic lead treat migraine and will re-evaluate  Prior to Admission Medications   Prescriptions Last Dose Informant Patient Reported?  Taking?   acetaminophen (TYLENOL) 325 mg tablet   No No   Sig: Take 2 tablets (650 mg total) by mouth every 4 (four) hours as needed for mild pain   diazepam (VALIUM) 5 mg tablet   No No   Sig: Take 1 tablet (5 mg total) by mouth 2 (two) times a day for 10 days      Facility-Administered Medications: None       Past Medical History:   Diagnosis Date    Anxiety     CMTD (Charcot-Rosy-Tooth disease)     Depression     Migraines     Mitral valve prolapse        Past Surgical History:   Procedure Laterality Date    ANKLE SURGERY      DILATION AND CURETTAGE OF UTERUS      TUBAL LIGATION      WISDOM TOOTH EXTRACTION Family History   Problem Relation Age of Onset    Heart disease Mother     No Known Problems Father      I have reviewed and agree with the history as documented  E-Cigarette/Vaping    E-Cigarette Use Never User      E-Cigarette/Vaping Substances    Nicotine No     THC No     CBD No     Flavoring No     Other No     Unknown No      Social History     Tobacco Use    Smoking status: Never Smoker    Smokeless tobacco: Never Used   Vaping Use    Vaping Use: Never used   Substance Use Topics    Alcohol use: Not Currently    Drug use: Yes     Types: Marijuana     Comment: medical card       Review of Systems   Constitutional: Positive for activity change, appetite change, fatigue and fever  Eyes: Negative for photophobia and visual disturbance  Respiratory: Positive for cough and shortness of breath  Cardiovascular: Positive for chest pain  Gastrointestinal: Positive for nausea and vomiting  Negative for abdominal pain and diarrhea  Genitourinary: Negative for difficulty urinating and dysuria  Musculoskeletal: Negative for back pain and neck pain  Skin: Negative for color change and rash  Neurological: Positive for dizziness, syncope and headaches  Psychiatric/Behavioral: Negative for confusion  All other systems reviewed and are negative  Physical Exam  Physical Exam  Vitals and nursing note reviewed  Constitutional:       Appearance: Normal appearance  HENT:      Head: Normocephalic and atraumatic  Right Ear: External ear normal       Left Ear: Tympanic membrane and external ear normal    Cardiovascular:      Rate and Rhythm: Normal rate and regular rhythm  Pulmonary:      Effort: Pulmonary effort is normal       Breath sounds: Normal breath sounds  Comments: Slight dry cough  Abdominal:      General: There is no distension  Palpations: Abdomen is soft  Musculoskeletal:         General: No swelling or tenderness  Normal range of motion  Cervical back: Normal range of motion and neck supple  Skin:     General: Skin is warm and dry  Neurological:      General: No focal deficit present  Mental Status: She is alert and oriented to person, place, and time     Psychiatric:         Mood and Affect: Mood normal          Behavior: Behavior normal          Vital Signs  ED Triage Vitals   Temperature Pulse Respirations Blood Pressure SpO2   08/29/21 1354 08/29/21 1354 08/29/21 1354 08/29/21 1354 08/29/21 1354   98 2 °F (36 8 °C) (!) 49 14 118/78 99 %      Temp Source Heart Rate Source Patient Position - Orthostatic VS BP Location FiO2 (%)   08/29/21 1354 08/29/21 1354 08/29/21 1525 08/29/21 1354 --   Oral Monitor Lying Right arm       Pain Score       --                  Vitals:    08/29/21 1354 08/29/21 1525   BP: 118/78 109/78   Pulse: (!) 49 60   Patient Position - Orthostatic VS:  Lying         Visual Acuity  Visual Acuity      Most Recent Value   L Pupil Size (mm)  3   R Pupil Size (mm)  3          ED Medications  Medications   sodium chloride 0 9 % bolus 1,000 mL (0 mL Intravenous Stopped 8/29/21 1659)   ketorolac (TORADOL) injection 30 mg (30 mg Intravenous Given 8/29/21 1545)   magnesium sulfate 2 g/50 mL IVPB (premix) 2 g (0 g Intravenous Stopped 8/29/21 1659)       Diagnostic Studies  Results Reviewed     Procedure Component Value Units Date/Time    D-Dimer [[id number]]  (Abnormal) Collected: 08/29/21 1533    Lab Status: Final result Specimen: Blood from Arm, Right Updated: 08/29/21 1712     D-Dimer, Quant 0 50 ug/ml FEU     POCT pregnancy, urine [[id number]]  (Normal) Resulted: 08/29/21 1651    Lab Status: Final result Updated: 08/29/21 1651     EXT PREG TEST UR (Ref: Negative) Negative     Control Valid    COVID19, Influenza A/B, RSV PCR, [facility] [[id number]]  (Normal) Collected: 08/29/21 1529    Lab Status: Final result Specimen: Nares from Nose Updated: 08/29/21 1627     SARS-CoV-2 Negative     INFLUENZA A PCR Negative     INFLUENZA B PCR Negative     RSV PCR Negative    Narrative: This test has been authorized by FDA under an EUA (Emergency Use Assay) for use by authorized laboratories  Clinical caution and judgement should be used with the interpretation of these results with consideration of the clinical impression and other laboratory testing  Testing reported as "Positive" or "Negative" has been proven to be accurate according to standard laboratory validation requirements  All testing is performed with control materials showing appropriate reactivity at standard intervals      Troponin I [[id number]]  (Normal) Collected: 08/29/21 1534    Lab Status: Final result Specimen: Blood from Arm, Right Updated: 08/29/21 1606     Troponin I <0 02 ng/mL     Comprehensive metabolic panel [[id number]] Collected: 08/29/21 1534    Lab Status: Final result Specimen: Blood from Arm, Right Updated: 08/29/21 1604     Sodium 144 mmol/L      Potassium 4 0 mmol/L      Chloride 108 mmol/L      CO2 27 mmol/L      ANION GAP 9 mmol/L      BUN 12 mg/dL      Creatinine 0 72 mg/dL      Glucose 79 mg/dL      Calcium 9 0 mg/dL      AST 14 U/L      ALT 27 U/L      Alkaline Phosphatase 62 U/L      Total Protein 7 6 g/dL      Albumin 3 7 g/dL      Total Bilirubin 0 34 mg/dL      eGFR 113 ml/min/1 73sq m     Narrative:      [place] guidelines for Chronic Kidney Disease (CKD):     Stage 1 with normal or high GFR (GFR > 90 mL/min/1 73 square meters)    Stage 2 Mild CKD (GFR = 60-89 mL/min/1 73 square meters)    Stage 3A Moderate CKD (GFR = 45-59 mL/min/1 73 square meters)    Stage 3B Moderate CKD (GFR = 30-44 mL/min/1 73 square meters)    Stage 4 Severe CKD (GFR = 15-29 mL/min/1 73 square meters)    Stage 5 End Stage CKD (GFR <15 mL/min/1 73 square meters)  Note: GFR calculation is accurate only with a steady state creatinine    Magnesium [[id number]]  (Normal) Collected: 08/29/21 1534    Lab Status: Final result Specimen: Blood from Arm, Right Updated: 08/29/21 1604     Magnesium 2 0 mg/dL     CBC and differential [[id number]] Collected: 08/29/21 1534    Lab Status: Final result Specimen: Blood from Arm, Right Updated: 08/29/21 1542     WBC 6 23 Thousand/uL      RBC 4 62 Million/uL      Hemoglobin 14 0 g/dL      Hematocrit 40 9 %      MCV 89 fL      MCH 30 3 pg      MCHC 34 2 g/dL      RDW 13 2 %      MPV 9 1 fL      Platelets 530 Thousands/uL      nRBC 0 /100 WBCs      Neutrophils Relative 53 %      Immat GRANS % 1 %      Lymphocytes Relative 36 %      Monocytes Relative 8 %      Eosinophils Relative 1 %      Basophils Relative 1 %      Neutrophils Absolute 3 35 Thousands/µL      Immature Grans Absolute 0 04 Thousand/uL      Lymphocytes Absolute 2 21 Thousands/µL      Monocytes Absolute 0 52 Thousand/µL      Eosinophils Absolute 0 07 Thousand/µL      Basophils Absolute 0 04 Thousands/µL                  XR chest 1 view portable   ED Interpretation by Ju Lazaro MD (08/29 1618)   No acute changes                   Procedures  ECG 12 Lead Documentation Only    Date/Time: 8/29/2021 4:06 PM  Performed by: Ju Lazaro MD  Authorized by: Ju Lazaro MD     Indications / Diagnosis:  Dyspnea  ECG reviewed by me, the ED Provider: yes    Patient location:  ED  Previous ECG:     Previous ECG:  Compared to current    Comparison ECG info:  7/28/20    Similarity:  No change  Rate:     ECG rate:  54  Rhythm:     Rhythm: sinus bradycardia    Ectopy:     Ectopy: none    QRS:     QRS axis:  Normal  Conduction:     Conduction: normal    ST segments:     ST segments:  Normal  T waves:     T waves: normal               ED Course                                           MDM  Number of Diagnoses or Management Options  Adverse effect of vaccine, initial encounter: new and requires workup  Migraine headache: new and requires workup  Diagnosis management comments: 27-year-old female presented for evaluation of multiple symptoms over the last 3 days after getting her 1st COVID vaccination including migraine, fever, nausea, vomiting  Workup here unremarkable  Normal chest x-ray, EKG, labs  She had some improvement with Toradol, magnesium  Limited medication as patient needs to drive home  Stable for discharge  She will follow up or return if symptoms worsen  Amount and/or Complexity of Data Reviewed  Clinical lab tests: ordered and reviewed  Tests in the radiology section of CPT®: ordered and reviewed  Independent visualization of images, tracings, or specimens: yes    Patient Progress  Patient progress: improved      Disposition  Final diagnoses:   Migraine headache   Adverse effect of vaccine, initial encounter - Possible     Time reflects when diagnosis was documented in both MDM as applicable and the Disposition within this note     Time User Action Codes Description Comment    8/29/2021  4:39 PM Lacretia Ped Add [G43 909] Migraine headache     8/29/2021  4:40 PM Lacretia Ped Add [T50  Z95A] Adverse effect of vaccine, initial encounter     8/29/2021  4:40 PM Lacretia Ped Modify [T50  Z95A] Adverse effect of vaccine, initial encounter Possible      ED Disposition     ED Disposition Condition Date/Time Comment    Discharge Stable Sun Aug 29, 2021  5:20 PM Coretta Silence discharge to home/self care              Follow-up Information     Follow up With Specialties Details Why Contact Info Additional Matt 11, DO Family Medicine   [address]  [facility]  [address] [place] [address]  [phone]       [facility] Emergency Medicine  If symptoms worsen [address] [facility] [address] [facility] Emergency Department, [address], [place], [place], [address]          Discharge Medication List as of 8/29/2021  5:20 PM      CONTINUE these medications which have NOT CHANGED    Details   acetaminophen (TYLENOL) 325 mg tablet Take 2 tablets (650 mg total) by mouth every 4 (four) hours as needed for mild pain, Starting Mon 5/31/2021, Normal      diazepam (VALIUM) 5 mg tablet Take 1 tablet (5 mg total) by mouth 2 (two) times a day for 10 days, Starting Mon 5/31/2021, Until u 6/10/2021, Normal           No discharge procedures on file      PDMP Review     None          ED Provider  Electronically Signed by           Perry Castrejon MD  08/29/21 6208

## 2021-08-29 NOTE — ED NOTES
Provided patient's children with snacks per verbal ok from patient        Laura Morse RN  08/29/21 7685

## 2021-08-29 NOTE — TELEPHONE ENCOUNTER
Reason for Disposition   MILD difficulty breathing (e g , minimal/no SOB at rest, SOB with walking, pulse <100)    Additional Information   [1] Typical COVID-19 symptoms AND [2] started > 3 days after getting vaccine    Answer Assessment - Initial Assessment Questions  1  MAIN CONCERN OR SYMPTOM:  "What is your main concern right now?" "What question do you have?" "What's the main symptom you're worried about?" (e g , fever, pain, redness, swelling)      Migraines, body aches, fatigue, low grade fever of 99 8, vomited yesterday, SOB and chest tightness  States "when sitting down and talking I have to make myself take deeper breaths, and I was feeling SOB when cleaning around the house"    2  VACCINE: "What vaccination did you receive?" "Is this your first or second shot?" (e g , none; [employer], J[employer]J, Tianna Rao, Pencil You In, other)      Pencil You In     3  SYMPTOM ONSET: "When did the s/s  begin?" (e g , not relevant; hours, days)       Yesterday     4  SYMPTOM SEVERITY: "How bad is it?"       Moderate-severe     5  FEVER: "Is there a fever?" If Yes, ask: "What is it, how was it measured, and when did it start?"       Low grade 99 8    6  PAST REACTIONS: "Have you reacted to immunizations before?" If Yes, ask: "What happened?"      Denies, first vaccine     7   OTHER SYMPTOMS: "Do you have any other symptoms?"      Denies    Protocols used: CORONAVIRUS (COVID-19) DIAGNOSED OR SUSPECTED-ADULT-, CORONAVIRUS (COVID-19) VACCINE QUESTIONS AND REACTIONS-ADULT-

## 2021-08-29 NOTE — Clinical Note
Joseph Brown was seen and treated in our emergency department on 8/29/2021  Diagnosis:     Von Yang  may return to work on return date  She may return on this date: 08/31/2021         If you have any questions or concerns, please don't hesitate to call        Macy Arzola MD    ______________________________           _______________          _______________  Hospital Representative                              Date                                Time

## 2021-08-31 LAB
ATRIAL RATE: 58 BPM
P AXIS: 50 DEGREES
PR INTERVAL: 178 MS
QRS AXIS: 14 DEGREES
QRSD INTERVAL: 92 MS
QT INTERVAL: 436 MS
QTC INTERVAL: 414 MS
T WAVE AXIS: 22 DEGREES
VENTRICULAR RATE: 54 BPM

## 2021-08-31 PROCEDURE — 93010 ELECTROCARDIOGRAM REPORT: CPT | Performed by: INTERNAL MEDICINE

## 2021-11-23 DIAGNOSIS — N76.0 ACUTE VAGINITIS: Primary | ICD-10-CM

## 2021-11-23 RX ORDER — FLUCONAZOLE 150 MG/1
150 TABLET ORAL ONCE
Qty: 1 TABLET | Refills: 0 | Status: SHIPPED | OUTPATIENT
Start: 2021-11-23 | End: 2021-11-23

## 2021-12-22 ENCOUNTER — ANNUAL EXAM (OUTPATIENT)
Dept: OBGYN CLINIC | Facility: CLINIC | Age: 31
End: 2021-12-22
Payer: COMMERCIAL

## 2021-12-22 VITALS — BODY MASS INDEX: 37.08 KG/M2 | DIASTOLIC BLOOD PRESSURE: 76 MMHG | WEIGHT: 216 LBS | SYSTOLIC BLOOD PRESSURE: 118 MMHG

## 2021-12-22 DIAGNOSIS — Z01.419 WOMEN'S ANNUAL ROUTINE GYNECOLOGICAL EXAMINATION: Primary | ICD-10-CM

## 2021-12-22 DIAGNOSIS — N62 MACROMASTIA: ICD-10-CM

## 2021-12-22 DIAGNOSIS — B96.89 BV (BACTERIAL VAGINOSIS): ICD-10-CM

## 2021-12-22 DIAGNOSIS — N76.0 BV (BACTERIAL VAGINOSIS): ICD-10-CM

## 2021-12-22 DIAGNOSIS — Z11.3 SCREENING EXAMINATION FOR STD (SEXUALLY TRANSMITTED DISEASE): ICD-10-CM

## 2021-12-22 DIAGNOSIS — N76.0 ACUTE VAGINITIS: ICD-10-CM

## 2021-12-22 PROBLEM — M25.562 CHRONIC PAIN OF BOTH KNEES: Status: ACTIVE | Noted: 2017-10-16

## 2021-12-22 PROBLEM — M25.561 CHRONIC PAIN OF BOTH KNEES: Status: ACTIVE | Noted: 2017-10-16

## 2021-12-22 PROBLEM — F32.A ANXIETY AND DEPRESSION: Status: ACTIVE | Noted: 2021-02-03

## 2021-12-22 PROBLEM — M54.2 NECK PAIN: Status: ACTIVE | Noted: 2020-02-10

## 2021-12-22 PROBLEM — R29.898 WEAKNESS OF BOTH LOWER EXTREMITIES: Status: ACTIVE | Noted: 2017-10-16

## 2021-12-22 PROBLEM — M79.10 MYALGIA: Status: ACTIVE | Noted: 2017-02-01

## 2021-12-22 PROBLEM — F41.9 ANXIETY AND DEPRESSION: Status: ACTIVE | Noted: 2021-02-03

## 2021-12-22 PROBLEM — E78.5 HYPERLIPIDEMIA: Status: ACTIVE | Noted: 2017-02-01

## 2021-12-22 PROBLEM — D49.2 NEOPLASM OF SCALP: Status: ACTIVE | Noted: 2018-10-24

## 2021-12-22 PROBLEM — E66.01 MORBID OBESITY (HCC): Status: ACTIVE | Noted: 2020-02-10

## 2021-12-22 PROBLEM — T78.40XA ALLERGIC REACTION: Status: ACTIVE | Noted: 2017-02-01

## 2021-12-22 PROBLEM — G89.29 CHRONIC PAIN OF BOTH KNEES: Status: ACTIVE | Noted: 2017-10-16

## 2021-12-22 PROBLEM — M54.50 ACUTE BILATERAL LOW BACK PAIN WITHOUT SCIATICA: Status: ACTIVE | Noted: 2021-06-03

## 2021-12-22 PROBLEM — Z82.49 FAMILY HISTORY OF CARDIAC ARREST: Status: ACTIVE | Noted: 2019-07-01

## 2021-12-22 PROBLEM — K52.9 COLITIS: Status: ACTIVE | Noted: 2021-06-03

## 2021-12-22 PROCEDURE — G0145 SCR C/V CYTO,THINLAYER,RESCR: HCPCS | Performed by: NURSE PRACTITIONER

## 2021-12-22 PROCEDURE — 87591 N.GONORRHOEAE DNA AMP PROB: CPT | Performed by: NURSE PRACTITIONER

## 2021-12-22 PROCEDURE — G0476 HPV COMBO ASSAY CA SCREEN: HCPCS | Performed by: NURSE PRACTITIONER

## 2021-12-22 PROCEDURE — 99385 PREV VISIT NEW AGE 18-39: CPT | Performed by: NURSE PRACTITIONER

## 2021-12-22 PROCEDURE — 87491 CHLMYD TRACH DNA AMP PROBE: CPT | Performed by: NURSE PRACTITIONER

## 2021-12-22 RX ORDER — IBUPROFEN 200 MG
TABLET ORAL EVERY 6 HOURS PRN
COMMUNITY
End: 2022-01-27

## 2021-12-22 RX ORDER — METRONIDAZOLE 500 MG/1
500 TABLET ORAL EVERY 12 HOURS SCHEDULED
Qty: 14 TABLET | Refills: 0 | Status: SHIPPED | OUTPATIENT
Start: 2021-12-22 | End: 2021-12-29

## 2021-12-22 RX ORDER — FLUCONAZOLE 100 MG/1
100 TABLET ORAL DAILY
Qty: 2 TABLET | Refills: 0 | Status: SHIPPED | OUTPATIENT
Start: 2021-12-22 | End: 2021-12-24

## 2021-12-24 LAB
C TRACH DNA SPEC QL NAA+PROBE: NEGATIVE
HPV HR 12 DNA CVX QL NAA+PROBE: NEGATIVE
HPV16 DNA CVX QL NAA+PROBE: NEGATIVE
HPV18 DNA CVX QL NAA+PROBE: NEGATIVE
N GONORRHOEA DNA SPEC QL NAA+PROBE: NEGATIVE

## 2021-12-27 ENCOUNTER — APPOINTMENT (OUTPATIENT)
Dept: LAB | Facility: HOSPITAL | Age: 31
End: 2021-12-27
Payer: COMMERCIAL

## 2021-12-27 LAB
HAV IGM SER QL: NORMAL
HBV CORE IGM SER QL: NORMAL
HBV SURFACE AG SER QL: NORMAL
HCV AB SER QL: NORMAL

## 2021-12-27 PROCEDURE — 86592 SYPHILIS TEST NON-TREP QUAL: CPT | Performed by: NURSE PRACTITIONER

## 2021-12-27 PROCEDURE — 86696 HERPES SIMPLEX TYPE 2 TEST: CPT | Performed by: NURSE PRACTITIONER

## 2021-12-27 PROCEDURE — 87389 HIV-1 AG W/HIV-1&-2 AB AG IA: CPT | Performed by: NURSE PRACTITIONER

## 2021-12-27 PROCEDURE — 86695 HERPES SIMPLEX TYPE 1 TEST: CPT | Performed by: NURSE PRACTITIONER

## 2021-12-27 PROCEDURE — 80074 ACUTE HEPATITIS PANEL: CPT | Performed by: NURSE PRACTITIONER

## 2021-12-27 PROCEDURE — 36415 COLL VENOUS BLD VENIPUNCTURE: CPT | Performed by: NURSE PRACTITIONER

## 2021-12-28 LAB
HIV 1+2 AB+HIV1 P24 AG SERPL QL IA: NORMAL
HSV1 IGG SER IA-ACNC: <0.91 INDEX (ref 0–0.9)
HSV2 IGG SER IA-ACNC: <0.91 INDEX (ref 0–0.9)
RPR SER QL: NORMAL

## 2022-01-06 ENCOUNTER — TELEPHONE (OUTPATIENT)
Dept: OBGYN CLINIC | Facility: CLINIC | Age: 32
End: 2022-01-06

## 2022-01-06 LAB
LAB AP GYN PRIMARY INTERPRETATION: NORMAL
LAB AP LMP: NORMAL
Lab: NORMAL
PATH INTERP SPEC-IMP: NORMAL

## 2022-01-11 ENCOUNTER — OFFICE VISIT (OUTPATIENT)
Dept: FAMILY MEDICINE CLINIC | Facility: CLINIC | Age: 32
End: 2022-01-11
Payer: COMMERCIAL

## 2022-01-11 VITALS
SYSTOLIC BLOOD PRESSURE: 120 MMHG | HEART RATE: 70 BPM | TEMPERATURE: 98.4 F | DIASTOLIC BLOOD PRESSURE: 70 MMHG | RESPIRATION RATE: 16 BRPM | WEIGHT: 216 LBS | HEIGHT: 63 IN | OXYGEN SATURATION: 97 % | BODY MASS INDEX: 38.27 KG/M2

## 2022-01-11 DIAGNOSIS — Z00.00 ROUTINE ADULT HEALTH MAINTENANCE: Primary | ICD-10-CM

## 2022-01-11 DIAGNOSIS — R01.1 HEART MURMUR: ICD-10-CM

## 2022-01-11 DIAGNOSIS — F32.0 CURRENT MILD EPISODE OF MAJOR DEPRESSIVE DISORDER, UNSPECIFIED WHETHER RECURRENT (HCC): ICD-10-CM

## 2022-01-11 PROCEDURE — 99385 PREV VISIT NEW AGE 18-39: CPT | Performed by: NURSE PRACTITIONER

## 2022-01-11 RX ORDER — AMOXICILLIN 875 MG/1
875 TABLET, COATED ORAL 2 TIMES DAILY
Qty: 4 TABLET | Refills: 0 | Status: SHIPPED | OUTPATIENT
Start: 2022-01-11 | End: 2022-01-13

## 2022-01-11 RX ORDER — VENLAFAXINE HYDROCHLORIDE 37.5 MG/1
37.5 CAPSULE, EXTENDED RELEASE ORAL
Qty: 30 CAPSULE | Refills: 1 | Status: SHIPPED | OUTPATIENT
Start: 2022-01-11 | End: 2022-02-01 | Stop reason: SDUPTHER

## 2022-01-11 NOTE — PROGRESS NOTES
Assessment/Plan:  Adult health physical labs were ordered patient said advised complete those earliest convenience patient does issues past medical history of heart murmur of requested small dose of antibiotics that was given amoxicillin 4 tablets  Patient was get restarted on venlafaxine 37 5 mg to be taken daily as prescribed  Patient will follow up back in the office with me in 2 weeks to discuss how she is tolerating this medication  All questions were answered patient verbalized she is very happy with the outcome of today's visit  Dosing all possible side effects of the prescribed medications or medications that had been prescribed in the past were reviewed and all questions were answered  Patient verbalized agreement and understanding of the plan of care as outlined during the office visit today return to office as indicated or sooner if a problem arises  Problem List Items Addressed This Visit     None      Visit Diagnoses     Routine adult health maintenance    -  Primary    Relevant Medications    amoxicillin (AMOXIL) 875 mg tablet    venlafaxine (EFFEXOR-XR) 37 5 mg 24 hr capsule    Other Relevant Orders    CBC and differential    Comprehensive metabolic panel    Lipid panel    UA w Reflex to Microscopic w Reflex to Culture    TSH, 3rd generation with Free T4 reflex    Current mild episode of major depressive disorder, unspecified whether recurrent (HCC)        Relevant Medications    venlafaxine (EFFEXOR-XR) 37 5 mg 24 hr capsule    Heart murmur        Relevant Medications    amoxicillin (AMOXIL) 875 mg tablet    BMI 38 0-38 9,adult                Subjective:   BMI Counseling: Body mass index is 38 26 kg/m²   The BMI is above normal  Nutrition recommendations include decreasing portion sizes, encouraging healthy choices of fruits and vegetables, decreasing fast food intake, consuming healthier snacks, limiting drinks that contain sugar, moderation in carbohydrate intake, increasing intake of lean protein, reducing intake of saturated and trans fat and reducing intake of cholesterol  Exercise recommendations include moderate physical activity 150 minutes/week, vigorous physical activity 75 minutes/week, exercising 3-5 times per week, obtaining a gym membership and strength training exercises  No pharmacotherapy was ordered  Patient referred to PCP  Rationale for BMI follow-up plan is due to patient being overweight or obese  Depression Screening and Follow-up Plan: Patient assessed for underlying major depression  Brief counseling provided and recommend additional follow-up/re-evaluation next office visit  Patient ID: Azul Garcia is a 32 y o  female  Patient is here for a routine health physical screening to establish care at this practice  She is due for routine labs she really has no particular new complaints  Patient sees tree is a clearly available on her chart  Patient states that she is doing well however she does see a psychiatrist in the past and had been on venlafaxine in and tolerated it very well she feels that she would benefit from restarting this medication  Will discuss at visit  The following portions of the patient's history were reviewed and updated as appropriate: allergies, current medications, past family history, past medical history, past social history, past surgical history and problem list     Review of Systems   Constitutional: Negative for appetite change and fever  HENT: Negative for sinus pressure and sore throat  Eyes: Negative for pain  Respiratory: Negative for shortness of breath  Cardiovascular: Negative for chest pain  Gastrointestinal: Negative for abdominal pain  Genitourinary: Negative for dysuria  Musculoskeletal: Negative for arthralgias and myalgias  Skin: Negative for color change  Neurological: Negative for light-headedness  Psychiatric/Behavioral: Negative for behavioral problems   The patient is nervous/anxious  Objective:      /70 (BP Location: Left arm, Patient Position: Sitting, Cuff Size: Large)   Pulse 70   Temp 98 4 °F (36 9 °C) (Temporal)   Resp 16   Ht 5' 3" (1 6 m)   Wt 98 kg (216 lb)   LMP 12/30/2021 (Approximate)   SpO2 97%   BMI 38 26 kg/m²          Physical Exam  Vitals and nursing note reviewed  Constitutional:       General: She is not in acute distress  Appearance: She is well-developed  She is not diaphoretic  HENT:      Head: Normocephalic and atraumatic  Right Ear: External ear normal       Left Ear: External ear normal       Mouth/Throat:      Pharynx: Oropharynx is clear  Eyes:      Pupils: Pupils are equal, round, and reactive to light  Cardiovascular:      Rate and Rhythm: Normal rate and regular rhythm  Pulses: Normal pulses  Heart sounds: Normal heart sounds  Pulmonary:      Effort: Pulmonary effort is normal       Breath sounds: Normal breath sounds  Abdominal:      Palpations: Abdomen is soft  Musculoskeletal:         General: Normal range of motion  Cervical back: Normal range of motion and neck supple  Skin:     General: Skin is dry  Neurological:      General: No focal deficit present  Mental Status: She is alert and oriented to person, place, and time  Psychiatric:         Behavior: Behavior normal          Thought Content:  Thought content normal

## 2022-01-19 ENCOUNTER — TELEPHONE (OUTPATIENT)
Dept: OBGYN CLINIC | Facility: CLINIC | Age: 32
End: 2022-01-19

## 2022-01-19 DIAGNOSIS — N76.0 ACUTE VAGINITIS: Primary | ICD-10-CM

## 2022-01-19 RX ORDER — FLUCONAZOLE 100 MG/1
100 TABLET ORAL DAILY
Qty: 2 TABLET | Refills: 0 | Status: SHIPPED | OUTPATIENT
Start: 2022-01-19 | End: 2022-01-21

## 2022-01-27 DIAGNOSIS — N94.6 DYSMENORRHEA: Primary | ICD-10-CM

## 2022-01-27 DIAGNOSIS — B96.89 BV (BACTERIAL VAGINOSIS): ICD-10-CM

## 2022-01-27 DIAGNOSIS — N76.0 BV (BACTERIAL VAGINOSIS): ICD-10-CM

## 2022-01-27 RX ORDER — METRONIDAZOLE 500 MG/1
500 TABLET ORAL EVERY 12 HOURS SCHEDULED
Qty: 14 TABLET | Refills: 0 | Status: SHIPPED | OUTPATIENT
Start: 2022-01-27 | End: 2022-02-03

## 2022-01-27 RX ORDER — IBUPROFEN 800 MG/1
800 TABLET ORAL EVERY 6 HOURS PRN
Qty: 30 TABLET | Refills: 1 | Status: SHIPPED | OUTPATIENT
Start: 2022-01-27 | End: 2022-04-11 | Stop reason: SDUPTHER

## 2022-02-01 ENCOUNTER — OFFICE VISIT (OUTPATIENT)
Dept: FAMILY MEDICINE CLINIC | Facility: CLINIC | Age: 32
End: 2022-02-01
Payer: COMMERCIAL

## 2022-02-01 VITALS
HEIGHT: 63 IN | WEIGHT: 216 LBS | OXYGEN SATURATION: 98 % | SYSTOLIC BLOOD PRESSURE: 116 MMHG | RESPIRATION RATE: 16 BRPM | TEMPERATURE: 98.2 F | HEART RATE: 72 BPM | DIASTOLIC BLOOD PRESSURE: 64 MMHG | BODY MASS INDEX: 38.27 KG/M2

## 2022-02-01 DIAGNOSIS — F32.0 CURRENT MILD EPISODE OF MAJOR DEPRESSIVE DISORDER, UNSPECIFIED WHETHER RECURRENT (HCC): Primary | ICD-10-CM

## 2022-02-01 DIAGNOSIS — Z00.00 ROUTINE ADULT HEALTH MAINTENANCE: ICD-10-CM

## 2022-02-01 PROCEDURE — 99213 OFFICE O/P EST LOW 20 MIN: CPT | Performed by: NURSE PRACTITIONER

## 2022-02-01 RX ORDER — VENLAFAXINE HYDROCHLORIDE 75 MG/1
75 CAPSULE, EXTENDED RELEASE ORAL
Qty: 90 CAPSULE | Refills: 1 | Status: SHIPPED | OUTPATIENT
Start: 2022-02-01 | End: 2022-05-23 | Stop reason: SDUPTHER

## 2022-02-01 NOTE — PROGRESS NOTES
Assessment/Plan:   Anxiety depression  Patient has a significant increase in mood but she states is not quite where she feels she needs to be  Patient recently started on venlafaxine 37 5 mg to be taken daily patient states that this level is not giving her sufficient control  Patient was on 75 mg ER daily which provided excellent control will increase the medication to this level  She denies any thoughts of harm or harming anyone else  Will send to the pharmacy  Dosing all possible side effects were discussed patient states that she is very happy with the outcome of today's visit she will see me back in the office in 3-6 months or as needed  Subjective:     Patient ID: Eber Rodriguez is a 32 y o  female  At last visit patient was started on venlafaxine as a real [place] out for the medication she had been on in the past   She states she tolerated very well will start 37 5 mg IM she states she is doing very well will answer any questions or concerns possibly increase the dosage for her today if need be  Review of Systems   Constitutional: Negative for appetite change and fever  HENT: Negative for sinus pressure and sore throat  Eyes: Negative for pain  Respiratory: Negative for shortness of breath  Cardiovascular: Negative for chest pain  Gastrointestinal: Negative for abdominal pain  Genitourinary: Negative for dysuria  Musculoskeletal: Negative for arthralgias and myalgias  Skin: Negative for color change  Neurological: Negative for light-headedness  Psychiatric/Behavioral: Negative for behavioral problems  Objective:     Physical Exam  Vitals and nursing note reviewed  Constitutional:       General: She is not in acute distress  Appearance: She is well-developed  She is not diaphoretic  HENT:      Head: Normocephalic and atraumatic  Eyes:      Pupils: Pupils are equal, round, and reactive to light     Cardiovascular:      Rate and Rhythm: Normal rate and regular rhythm  Heart sounds: Normal heart sounds  Pulmonary:      Effort: Pulmonary effort is normal       Breath sounds: Normal breath sounds  Abdominal:      Palpations: Abdomen is soft  Skin:     General: Skin is dry  Neurological:      Mental Status: She is alert and oriented to person, place, and time  Psychiatric:         Behavior: Behavior normal          Thought Content:  Thought content normal

## 2022-02-03 ENCOUNTER — CLINICAL SUPPORT (OUTPATIENT)
Dept: FAMILY MEDICINE CLINIC | Facility: CLINIC | Age: 32
End: 2022-02-03
Payer: COMMERCIAL

## 2022-02-03 DIAGNOSIS — J06.9 URI WITH COUGH AND CONGESTION: Primary | ICD-10-CM

## 2022-02-03 LAB
SARS-COV-2 AG UPPER RESP QL IA: NEGATIVE
VALID CONTROL: NORMAL

## 2022-02-03 PROCEDURE — 87811 SARS-COV-2 COVID19 W/OPTIC: CPT | Performed by: NURSE PRACTITIONER

## 2022-02-07 ENCOUNTER — CLINICAL SUPPORT (OUTPATIENT)
Dept: FAMILY MEDICINE CLINIC | Facility: CLINIC | Age: 32
End: 2022-02-07
Payer: COMMERCIAL

## 2022-02-07 VITALS
WEIGHT: 213 LBS | TEMPERATURE: 97.7 F | OXYGEN SATURATION: 94 % | RESPIRATION RATE: 18 BRPM | HEART RATE: 74 BPM | SYSTOLIC BLOOD PRESSURE: 116 MMHG | DIASTOLIC BLOOD PRESSURE: 80 MMHG | HEIGHT: 63 IN | BODY MASS INDEX: 37.74 KG/M2

## 2022-02-07 DIAGNOSIS — J02.9 SORE THROAT: ICD-10-CM

## 2022-02-07 DIAGNOSIS — J06.9 URI WITH COUGH AND CONGESTION: Primary | ICD-10-CM

## 2022-02-07 LAB
S PYO AG THROAT QL: NEGATIVE
SARS-COV-2 AG UPPER RESP QL IA: POSITIVE
VALID CONTROL: ABNORMAL

## 2022-02-07 PROCEDURE — 87811 SARS-COV-2 COVID19 W/OPTIC: CPT | Performed by: NURSE PRACTITIONER

## 2022-02-07 PROCEDURE — 87880 STREP A ASSAY W/OPTIC: CPT | Performed by: NURSE PRACTITIONER

## 2022-02-07 RX ORDER — BENZONATATE 100 MG/1
100 CAPSULE ORAL 3 TIMES DAILY PRN
Qty: 21 CAPSULE | Refills: 0 | Status: SHIPPED | OUTPATIENT
Start: 2022-02-07 | End: 2022-06-06

## 2022-02-16 ENCOUNTER — TELEPHONE (OUTPATIENT)
Dept: OBGYN CLINIC | Facility: CLINIC | Age: 32
End: 2022-02-16

## 2022-02-16 DIAGNOSIS — N94.6 DYSMENORRHEA: Primary | ICD-10-CM

## 2022-02-16 RX ORDER — NORETHINDRONE ACETATE AND ETHINYL ESTRADIOL 1MG-20(21)
1 KIT ORAL DAILY
Qty: 84 TABLET | Refills: 0 | Status: SHIPPED | OUTPATIENT
Start: 2022-02-16 | End: 2022-04-05 | Stop reason: SDUPTHER

## 2022-02-16 NOTE — TELEPHONE ENCOUNTER
Patient called she is interested in initiating OCP for management of her dysmenorrhea  Her next cycle is 2/25  She will initiate OCP on 2/27  In the meantime she will premedicate with Ibuprofen starting a few days prior to the start of her cycle  There are no contraindications  Follow-up in 2 months for BP check

## 2022-03-21 ENCOUNTER — HOSPITAL ENCOUNTER (EMERGENCY)
Facility: HOSPITAL | Age: 32
Discharge: HOME | End: 2022-03-21
Attending: EMERGENCY MEDICINE | Admitting: EMERGENCY MEDICINE
Payer: COMMERCIAL

## 2022-03-21 VITALS
HEART RATE: 58 BPM | TEMPERATURE: 98.1 F | OXYGEN SATURATION: 100 % | WEIGHT: 210 LBS | DIASTOLIC BLOOD PRESSURE: 97 MMHG | BODY MASS INDEX: 35.85 KG/M2 | HEIGHT: 64 IN | RESPIRATION RATE: 16 BRPM | SYSTOLIC BLOOD PRESSURE: 154 MMHG

## 2022-03-21 DIAGNOSIS — F41.0 PANIC ATTACK: Primary | ICD-10-CM

## 2022-03-21 LAB
ATRIAL RATE: 63 BPM
P AXIS: 70 DEGREES
PR INTERVAL: 172 MS
QRS AXIS: 81 DEGREES
QRSD INTERVAL: 82 MS
QT INTERVAL: 394 MS
QTC INTERVAL: 403 MS
T WAVE AXIS: 37 DEGREES
VENTRICULAR RATE: 63 BPM

## 2022-03-21 PROCEDURE — 93005 ELECTROCARDIOGRAM TRACING: CPT

## 2022-03-21 PROCEDURE — 99283 EMERGENCY DEPT VISIT LOW MDM: CPT

## 2022-03-21 PROCEDURE — 93010 ELECTROCARDIOGRAM REPORT: CPT | Performed by: INTERNAL MEDICINE

## 2022-03-21 PROCEDURE — 99284 EMERGENCY DEPT VISIT MOD MDM: CPT | Performed by: EMERGENCY MEDICINE

## 2022-03-21 RX ORDER — LORAZEPAM 0.5 MG/1
0.5 TABLET ORAL ONCE
Status: COMPLETED | OUTPATIENT
Start: 2022-03-21 | End: 2022-03-21

## 2022-03-21 RX ORDER — LORAZEPAM 1 MG/1
0.5 TABLET ORAL
Qty: 4 TABLET | Refills: 0 | Status: SHIPPED | OUTPATIENT
Start: 2022-03-21 | End: 2022-03-22 | Stop reason: ALTCHOICE

## 2022-03-21 RX ADMIN — LORAZEPAM 0.5 MG: 0.5 TABLET ORAL at 13:11

## 2022-03-21 NOTE — ED PROVIDER NOTES
History  Chief Complaint   Patient presents with    Panic Attack     Pt c o sob, and anxiety that started last night  Pt reports she thinks she is having a panic attack     57-year-old female patient with history of anxiety and PTSD presenting with anxiety onset last night  Patient states that recently her oldest daughter has been stressed out and she is unable to help her because she lives with her ex-  Patient states she has been stressed from this  States her ex  has been being difficult and states patient has been having anxiety with this  States patient was working today when she started getting mild shortness of breath and chest tightness  Patient states she has anxious thoughts and could not stop thinking about it  Patient is on effexor for anxiety  Patient does not have a therapist and psychiatrist   Denies fever, chest pain, headaches, nausea vomiting diarrhea  Denies SI, HI, hallucinations  Prior to Admission Medications   Prescriptions Last Dose Informant Patient Reported?  Taking?   acetaminophen (TYLENOL) 325 mg tablet   No No   Sig: Take 2 tablets (650 mg total) by mouth every 4 (four) hours as needed for mild pain   Patient not taking: Reported on 1/11/2022    benzonatate (TESSALON PERLES) 100 mg capsule   No No   Sig: Take 1 capsule (100 mg total) by mouth 3 (three) times a day as needed for cough   ibuprofen (MOTRIN) 800 mg tablet   No No   Sig: Take 1 tablet (800 mg total) by mouth every 6 (six) hours as needed for moderate pain   norethindrone-ethinyl estradiol (Loestrin Fe 1/20) 1-20 MG-MCG per tablet   No No   Sig: Take 1 tablet by mouth daily   venlafaxine (EFFEXOR-XR) 75 mg 24 hr capsule   No No   Sig: Take 1 capsule (75 mg total) by mouth daily with breakfast      Facility-Administered Medications: None       Past Medical History:   Diagnosis Date    Anxiety     CMTD (Charcot-Rosy-Tooth disease)     Depression     Migraines     Mitral valve prolapse Past Surgical History:   Procedure Laterality Date    ANKLE SURGERY      DILATION AND CURETTAGE OF UTERUS      TUBAL LIGATION      WISDOM TOOTH EXTRACTION         Family History   Problem Relation Age of Onset    Heart disease Mother     Other Mother         mutant gene "heart"    Mitral valve prolapse Mother     No Known Problems Father     Heart disease Maternal Grandmother      I have reviewed and agree with the history as documented  E-Cigarette/Vaping    E-Cigarette Use Never User      E-Cigarette/Vaping Substances    Nicotine No     THC No     CBD No     Flavoring No     Other No     Unknown No      Social History     Tobacco Use    Smoking status: Never Smoker    Smokeless tobacco: Never Used   Vaping Use    Vaping Use: Never used   Substance Use Topics    Alcohol use: Yes    Drug use: Yes     Types: Marijuana     Comment: medical card        Review of Systems   Constitutional: Negative for chills, fatigue and fever  HENT: Negative for congestion, rhinorrhea and sore throat  Eyes: Negative for pain and visual disturbance  Respiratory: Positive for chest tightness and shortness of breath  Negative for cough  Cardiovascular: Negative for chest pain and leg swelling  Gastrointestinal: Negative for abdominal distention, abdominal pain, diarrhea, nausea and vomiting  Genitourinary: Negative for difficulty urinating and dysuria  Musculoskeletal: Negative for arthralgias, back pain and myalgias  Skin: Negative for rash and wound  Neurological: Negative for dizziness, weakness and headaches  Psychiatric/Behavioral: Negative for hallucinations and suicidal ideas  The patient is nervous/anxious  All other systems reviewed and are negative        Physical Exam  ED Triage Vitals   Temperature Pulse Respirations Blood Pressure SpO2   03/21/22 1247 03/21/22 1225 03/21/22 1225 03/21/22 1225 03/21/22 1225   98 1 °F (36 7 °C) 58 16 154/97 100 %      Temp Source Heart Rate Source Patient Position - Orthostatic VS BP Location FiO2 (%)   03/21/22 1247 -- -- -- --   Oral          Pain Score       03/21/22 1225       No Pain             Orthostatic Vital Signs  Vitals:    03/21/22 1225   BP: 154/97   Pulse: 58       Physical Exam  Vitals reviewed  Constitutional:       Appearance: Normal appearance  Comments: tearful   HENT:      Head: Normocephalic and atraumatic  Nose: Nose normal       Mouth/Throat:      Mouth: Mucous membranes are moist       Pharynx: Oropharynx is clear  Eyes:      Extraocular Movements: Extraocular movements intact  Conjunctiva/sclera: Conjunctivae normal    Cardiovascular:      Rate and Rhythm: Normal rate and regular rhythm  Pulses: Normal pulses  Heart sounds: Normal heart sounds  Pulmonary:      Effort: Pulmonary effort is normal       Breath sounds: Normal breath sounds  Abdominal:      General: Bowel sounds are normal       Palpations: Abdomen is soft  Tenderness: There is no abdominal tenderness  Musculoskeletal:         General: Normal range of motion  Cervical back: Normal range of motion  Skin:     General: Skin is warm and dry  Neurological:      General: No focal deficit present  Mental Status: She is alert and oriented to person, place, and time  Mental status is at baseline  Psychiatric:      Comments: Patient anxious  Tearful on exam         ED Medications  Medications   LORazepam (ATIVAN) tablet 0 5 mg (0 5 mg Oral Given 3/21/22 1311)       Diagnostic Studies  Results Reviewed     None                 No orders to display         Procedures  Procedures      ED Course  ED Course as of 03/21/22 1628   Mon Mar 21, 2022   1425 EKG: normal EKG, normal sinus rhythm, unchanged from previous tracings                               SBIRT 22yo+      Most Recent Value   SBIRT (23 yo +)    In order to provide better care to our patients, we are screening all of our patients for alcohol and drug use   Would it be okay to ask you these screening questions? Unable to answer at this time Filed at: 03/21/2022 1308                [facility]  Number of Diagnoses or Management Options  Panic attack  Diagnosis management comments: 32 y o  patient with hx of anxiety and ptsd presenting with panic attack  Patient c/o panic attack onset last night from stressors  No HI, SI, hallucinations  Exam WNL  EKG unchanged from previous  Patient tx with ativan with improvement  Stable for discharge with follow up with PCP and given ativan course  Given psych outpatient resources  Return precautions given  Amount and/or Complexity of Data Reviewed  Clinical lab tests: ordered and reviewed        Disposition  Final diagnoses:   Panic attack     Time reflects when diagnosis was documented in both MDM as applicable and the Disposition within this note     Time User Action Codes Description Comment    3/21/2022  1:41 PM Tres YODER Add [F41 0] Panic attack       ED Disposition     ED Disposition Condition Date/Time Comment    Discharge Stable Mon Mar 21, 2022  1:41 PM Miriam Ruddy discharge to home/self care              Follow-up Information     Follow up With Specialties Details [id number] Nurse Practitioner Schedule an appointment as soon as possible for a visit   [id number] [facility] Male [address] [place] [id number]  [phone]            Discharge Medication List as of 3/21/2022  1:50 PM      START taking these medications    Details   LORazepam (Ativan) 1 mg tablet Take 0 5 tablets (0 5 mg total) by mouth daily at bedtime as needed for anxiety for up to 8 doses, Starting Mon 3/21/2022, Normal         CONTINUE these medications which have NOT CHANGED    Details   acetaminophen (TYLENOL) 325 mg tablet Take 2 tablets (650 mg total) by mouth every 4 (four) hours as needed for mild pain, Starting Mon 5/31/2021, Normal      benzonatate (TESSALON PERLES) 100 mg capsule Take 1 capsule (100 mg total) by mouth 3 (three) times a day as needed for cough, Starting Mon 2/7/2022, Normal      ibuprofen (MOTRIN) 800 mg tablet Take 1 tablet (800 mg total) by mouth every 6 (six) hours as needed for moderate pain, Starting Thu 1/27/2022, Normal      norethindrone-ethinyl estradiol (Loestrin Fe 1/20) 1-20 MG-MCG per tablet Take 1 tablet by mouth daily, Starting Wed 2/16/2022, Until Wed 5/11/2022, Normal      venlafaxine (EFFEXOR-XR) 75 mg 24 hr capsule Take 1 capsule (75 mg total) by mouth daily with breakfast, Starting Tue 2/1/2022, Normal           No discharge procedures on file  PDMP Review     None           ED Provider  Attending physically available and evaluated Aline Boyle I managed the patient along with the ED Attending      Electronically Signed by         Alexandria Harris MD  03/21/22 9128

## 2022-03-21 NOTE — ED ATTENDING ATTESTATION
3/21/2022  I, Neida Person, DO, saw and evaluated the patient  I have discussed the patient with the resident/non-physician practitioner and agree with the resident's/non-physician practitioner's findings, Plan of Care, and MDM as documented in the resident's/non-physician practitioner's note, except where noted  All available labs and Radiology studies were reviewed  I was present for key portions of any procedure(s) performed by the resident/non-physician practitioner and I was immediately available to provide assistance  At this point I agree with the current assessment done in the Emergency Department  I have conducted an independent evaluation of this patient a history and physical is as follows:    19-year-old female presents a panic attack  Patient states it started last night with racing thoughts, and increased anxiety  Today is having difficulty at work secondary to her symptoms  Had tachypnea, some tingling in her fingers and around her mouth and toes  Also complains of some tightness in her chest   Admits that she has history of anxiety has been under significantly more stress recently  No SI or HI  On exam-no acute distress, tearful at times, her bradycardia, no respiratory distress    Plan-crisis to evaluate, will give dose of Ativan here    ED Course         Critical Care Time  Procedures

## 2022-03-21 NOTE — DISCHARGE INSTRUCTIONS
You were seen in the ED for panic attack  Return to the ED for any worsening symptoms or new symptoms  Follow up with your primary care doctor as soon as possible  Follow up with the psychiatric resources that was given and take medications as directed

## 2022-03-22 ENCOUNTER — OFFICE VISIT (OUTPATIENT)
Dept: FAMILY MEDICINE CLINIC | Facility: CLINIC | Age: 32
End: 2022-03-22
Payer: COMMERCIAL

## 2022-03-22 VITALS
RESPIRATION RATE: 16 BRPM | SYSTOLIC BLOOD PRESSURE: 112 MMHG | HEIGHT: 64 IN | HEART RATE: 74 BPM | DIASTOLIC BLOOD PRESSURE: 74 MMHG | TEMPERATURE: 98.1 F | BODY MASS INDEX: 36.02 KG/M2 | OXYGEN SATURATION: 97 % | WEIGHT: 211 LBS

## 2022-03-22 DIAGNOSIS — F32.0 CURRENT MILD EPISODE OF MAJOR DEPRESSIVE DISORDER, UNSPECIFIED WHETHER RECURRENT (HCC): ICD-10-CM

## 2022-03-22 DIAGNOSIS — F41.9 ANXIETY: Primary | ICD-10-CM

## 2022-03-22 PROCEDURE — 99214 OFFICE O/P EST MOD 30 MIN: CPT | Performed by: NURSE PRACTITIONER

## 2022-03-22 RX ORDER — HYDROXYZINE HYDROCHLORIDE 25 MG/1
25 TABLET, FILM COATED ORAL EVERY 6 HOURS PRN
Qty: 90 TABLET | Refills: 1 | Status: SHIPPED | OUTPATIENT
Start: 2022-03-22

## 2022-03-22 NOTE — PROGRESS NOTES
Assessment/Plan:  Depression  Depression stable  Patient to continues to take the venlafaxine seventy-five extended-release well tolerated patient does not need refills at this visit  Anxiety  Patient was recently seen the hospital for anxiety panic attack  She was given Ativan however she states that it made her very very sleepy she did not tolerate the benzodiazepine  Did advised will send Atarax to the pharmacy to be taken every 6 hours as needed for anxiety flares  Patient is advised this medication can be taken at work but please take it at home 1st to determine if it causes sleepiness it can cause drowsiness  Patient verbalized agreement and understanding she states she is very happy with the outcome of today's visit  No note for work was given as she states she does not need 1  Dosing all possible side effects of the prescribed medications or medications that had been prescribed in the past were reviewed and all questions were answered  Patient verbalized agreement and understanding of the plan of care as outlined during the office visit today return to office as indicated or sooner if a problem arises  Problem List Items Addressed This Visit     None      Visit Diagnoses     Anxiety    -  Primary    Relevant Medications    hydrOXYzine HCL (ATARAX) 25 mg tablet    Current mild episode of major depressive disorder, unspecified whether recurrent (HCC)        Relevant Medications    hydrOXYzine HCL (ATARAX) 25 mg tablet            Subjective:      Patient ID: Azul Garcia is a 32 y o  female  HPI    The following portions of the patient's history were reviewed and updated as appropriate: allergies, current medications, past family history, past medical history, past social history, past surgical history and problem list     Review of Systems   Constitutional: Negative for appetite change and fever  HENT: Negative for sinus pressure and sore throat  Eyes: Negative for pain  Respiratory: Negative for shortness of breath  Cardiovascular: Negative for chest pain  Gastrointestinal: Negative for abdominal pain  Genitourinary: Negative for dysuria  Musculoskeletal: Negative for arthralgias and myalgias  Skin: Negative for color change  Neurological: Negative for light-headedness  Psychiatric/Behavioral: Negative for behavioral problems  The patient is nervous/anxious  Objective:      /74 (BP Location: Left arm, Patient Position: Sitting, Cuff Size: Large)   Pulse 74   Temp 98 1 °F (36 7 °C) (Temporal)   Resp 16   Ht 5' 3 5" (1 613 m)   Wt 95 7 kg (211 lb)   LMP 03/01/2022 (Approximate)   SpO2 97%   BMI 36 79 kg/m²          Physical Exam  Vitals and nursing note reviewed  Constitutional:       General: She is not in acute distress  Appearance: She is well-developed  She is not diaphoretic  HENT:      Head: Normocephalic and atraumatic  Cardiovascular:      Rate and Rhythm: Normal rate and regular rhythm  Pulses: Normal pulses  Heart sounds: Normal heart sounds  Pulmonary:      Breath sounds: Normal breath sounds  Skin:     General: Skin is dry  Neurological:      General: No focal deficit present  Mental Status: She is alert and oriented to person, place, and time  Psychiatric:         Mood and Affect: Mood normal          Behavior: Behavior normal          Thought Content:  Thought content normal          Judgment: Judgment normal

## 2022-04-05 ENCOUNTER — OFFICE VISIT (OUTPATIENT)
Dept: OBGYN CLINIC | Facility: CLINIC | Age: 32
End: 2022-04-05
Payer: COMMERCIAL

## 2022-04-05 VITALS
HEIGHT: 64 IN | BODY MASS INDEX: 36.54 KG/M2 | DIASTOLIC BLOOD PRESSURE: 74 MMHG | SYSTOLIC BLOOD PRESSURE: 116 MMHG | WEIGHT: 214 LBS

## 2022-04-05 DIAGNOSIS — N94.6 DYSMENORRHEA: ICD-10-CM

## 2022-04-05 PROCEDURE — 99212 OFFICE O/P EST SF 10 MIN: CPT | Performed by: NURSE PRACTITIONER

## 2022-04-05 RX ORDER — NORETHINDRONE ACETATE AND ETHINYL ESTRADIOL 1MG-20(21)
1 KIT ORAL DAILY
Qty: 84 TABLET | Refills: 0 | Status: SHIPPED | OUTPATIENT
Start: 2022-04-05 | End: 2022-04-11 | Stop reason: SDUPTHER

## 2022-04-05 NOTE — PROGRESS NOTES
Sheri Alvarez is a 32 y o  female who presents for contraception follow-up  She was started on Loestrin for management of her menstrual cramps  She is still experiencing menstrual cramps despite being on OCP and taking Ibuprofen  The patient is sexually active  Pertinent past medical history: none  Blood pressure is stable  Discussed the option of increasing her OCP dose or taking her current OCP continuously, skipping the placebo week  This will cause her not have a menses and may improve her symptoms  Menstrual History:    OB History        4    Para   3    Term   3            AB   1    Living   3       SAB   1    IAB        Ectopic        Multiple        Live Births   3               Patient's last menstrual period was 2022  The following portions of the patient's history were reviewed and updated as appropriate: allergies, current medications, past family history, past medical history, past social history, past surgical history and problem list     Review of Systems  Pertinent items are noted in HPI  Objective      /74   Ht 5' 3 5" (1 613 m)   Wt 97 1 kg (214 lb)   LMP 2022   BMI 37 31 kg/m²     Physical Exam  Constitutional:       Appearance: She is well-developed  HENT:      Head: Normocephalic and atraumatic  Cardiovascular:      Rate and Rhythm: Normal rate and regular rhythm  Pulmonary:      Effort: Pulmonary effort is normal       Breath sounds: Normal breath sounds  Musculoskeletal:      Cervical back: Neck supple  Neurological:      Mental Status: She is alert and oriented to person, place, and time  Skin:     General: Skin is warm  Vitals and nursing note reviewed  Assessment and Plan    Karl Garcia will take OCP continuously skipping the placebo week  Will try this for 3 months and f/u with update on her symptoms via MyChart  A refill of her OCP was sent to the pharmacy

## 2022-04-06 ENCOUNTER — HOSPITAL ENCOUNTER (EMERGENCY)
Facility: HOSPITAL | Age: 32
Discharge: HOME | End: 2022-04-06
Attending: EMERGENCY MEDICINE
Payer: COMMERCIAL

## 2022-04-06 VITALS
TEMPERATURE: 97.5 F | OXYGEN SATURATION: 99 % | HEART RATE: 54 BPM | DIASTOLIC BLOOD PRESSURE: 77 MMHG | RESPIRATION RATE: 20 BRPM | SYSTOLIC BLOOD PRESSURE: 130 MMHG

## 2022-04-06 DIAGNOSIS — G43.909 MIGRAINE: ICD-10-CM

## 2022-04-06 DIAGNOSIS — R51.9 HEADACHE: Primary | ICD-10-CM

## 2022-04-06 PROCEDURE — 99283 EMERGENCY DEPT VISIT LOW MDM: CPT

## 2022-04-06 PROCEDURE — 20552 NJX 1/MLT TRIGGER POINT 1/2: CPT | Performed by: EMERGENCY MEDICINE

## 2022-04-06 PROCEDURE — 96375 TX/PRO/DX INJ NEW DRUG ADDON: CPT

## 2022-04-06 PROCEDURE — 64405 NJX AA&/STRD GR OCPL NRV: CPT | Performed by: EMERGENCY MEDICINE

## 2022-04-06 PROCEDURE — 96374 THER/PROPH/DIAG INJ IV PUSH: CPT

## 2022-04-06 PROCEDURE — 99284 EMERGENCY DEPT VISIT MOD MDM: CPT | Performed by: EMERGENCY MEDICINE

## 2022-04-06 RX ORDER — LIDOCAINE HYDROCHLORIDE 10 MG/ML
10 INJECTION, SOLUTION EPIDURAL; INFILTRATION; INTRACAUDAL; PERINEURAL ONCE
Status: COMPLETED | OUTPATIENT
Start: 2022-04-06 | End: 2022-04-06

## 2022-04-06 RX ORDER — KETOROLAC TROMETHAMINE 30 MG/ML
15 INJECTION, SOLUTION INTRAMUSCULAR; INTRAVENOUS ONCE
Status: COMPLETED | OUTPATIENT
Start: 2022-04-06 | End: 2022-04-06

## 2022-04-06 RX ORDER — METOCLOPRAMIDE HYDROCHLORIDE 5 MG/ML
10 INJECTION INTRAMUSCULAR; INTRAVENOUS ONCE
Status: COMPLETED | OUTPATIENT
Start: 2022-04-06 | End: 2022-04-06

## 2022-04-06 RX ORDER — DIPHENHYDRAMINE HCL 25 MG
25 TABLET ORAL ONCE
Status: COMPLETED | OUTPATIENT
Start: 2022-04-06 | End: 2022-04-06

## 2022-04-06 RX ADMIN — METOCLOPRAMIDE HYDROCHLORIDE 10 MG: 5 INJECTION INTRAMUSCULAR; INTRAVENOUS at 16:03

## 2022-04-06 RX ADMIN — DIPHENHYDRAMINE HCL 25 MG: 25 TABLET ORAL at 16:03

## 2022-04-06 RX ADMIN — LIDOCAINE HYDROCHLORIDE 10 ML: 10 INJECTION, SOLUTION EPIDURAL; INFILTRATION; INTRACAUDAL at 16:18

## 2022-04-06 RX ADMIN — KETOROLAC TROMETHAMINE 15 MG: 30 INJECTION, SOLUTION INTRAMUSCULAR at 16:03

## 2022-04-06 NOTE — Clinical Note
Jeni Yuan was seen and treated in our emergency department on 4/6/2022  Diagnosis:     Nick Chavez  may return to work on return date  She may return on this date: 04/07/2022    Or sooner     If you have any questions or concerns, please don't hesitate to call        Molly Mark MD    ______________________________           _______________          _______________  Hospital Representative                              Date                                Time

## 2022-04-06 NOTE — ED ATTENDING ATTESTATION
4/6/2022  Jessica Martins DO, saw and evaluated the patient  I have discussed the patient with the resident/non-physician practitioner and agree with the resident's/non-physician practitioner's findings, Plan of Care, and MDM as documented in the resident's/non-physician practitioner's note, except where noted  All available labs and Radiology studies were reviewed  I was present for key portions of any procedure(s) performed by the resident/non-physician practitioner and I was immediately available to provide assistance  At this point I agree with the current assessment done in the Emergency Department  I have conducted an independent evaluation of this patient a history and physical is as follows:    31 yo female c/o HA, mostly localized to bifrontal region, but also occipital  associated with photophobia and phonophobia  Hx of migraines  Symptoms constant, no other a/e factors  Non radiating  Denies neck pain/stiffness, focal weakness/numbness/tingling, visual changes, speech changes  No other c/o at this time  Imp: HA likely migraine, possibly some tension component plan: multimodal analgesia        ED Course         Critical Care Time  Procedures

## 2022-04-06 NOTE — DISCHARGE INSTRUCTIONS
Patient Instructions: Today you were seen in the emergency department for a headache  We examined you and determined that you would be able to be discharged  You should take ibuprofen and tylenol as needed for your pain  You should follow up with your primary care doctor and your neurologist     Please return to the emergency department if your symptoms get worse, you develop a fever, or you have any other symptoms we discussed today prior to discharge  Nice to meet you! Best of luck with everything!

## 2022-04-07 ENCOUNTER — OFFICE VISIT (OUTPATIENT)
Dept: FAMILY MEDICINE CLINIC | Facility: CLINIC | Age: 32
End: 2022-04-07
Payer: COMMERCIAL

## 2022-04-07 VITALS
TEMPERATURE: 98.3 F | RESPIRATION RATE: 16 BRPM | BODY MASS INDEX: 36.02 KG/M2 | HEART RATE: 68 BPM | OXYGEN SATURATION: 98 % | DIASTOLIC BLOOD PRESSURE: 86 MMHG | SYSTOLIC BLOOD PRESSURE: 120 MMHG | WEIGHT: 211 LBS | HEIGHT: 64 IN

## 2022-04-07 DIAGNOSIS — M43.6 STIFFNESS OF NECK: Primary | ICD-10-CM

## 2022-04-07 DIAGNOSIS — R51.9 SEVERE HEADACHE: ICD-10-CM

## 2022-04-07 DIAGNOSIS — R11.0 NAUSEA: ICD-10-CM

## 2022-04-07 PROCEDURE — 99213 OFFICE O/P EST LOW 20 MIN: CPT | Performed by: NURSE PRACTITIONER

## 2022-04-07 RX ORDER — METHYLPREDNISOLONE 4 MG/1
TABLET ORAL
Qty: 21 EACH | Refills: 0 | Status: CANCELLED | OUTPATIENT
Start: 2022-04-07

## 2022-04-07 RX ORDER — ONDANSETRON 4 MG/1
4 TABLET, FILM COATED ORAL EVERY 8 HOURS PRN
Qty: 21 TABLET | Refills: 0 | Status: SHIPPED | OUTPATIENT
Start: 2022-04-07 | End: 2022-06-06

## 2022-04-07 NOTE — PROGRESS NOTES
Assessment/Plan:  Stiffness of neck in the presence of severe headache  Patient was seen in the emergency department 04/06/2022 states that she had a severe migraine at that time was given medication and lidocaine injections  Since that has happened she now has what she calls the worst headache of her life  nausea pain down the back of her neck and her spinal column  She states this is nothing like a migraine and she has had migraine headaches since the age of 15  Did advise these may not be symptoms of a typical migraine that she should return to the emergency department for follow-up evaluation as I am unable to rule out any acute processes in an outpatient setting  Patient verbalized that she will return to the emergency department for follow-up care  Carmen was offered she stated she must return home to her children to make arrangements  She will have a family member transfer her to the emergency department  Dosing all possible side effects of the prescribed medications or medications that had been prescribed in the past were reviewed and all questions were answered  Patient verbalized agreement and understanding of the plan of care as outlined during the office visit today return to office as indicated or sooner if a problem arises  Problem List Items Addressed This Visit     None      Visit Diagnoses     Stiffness of neck    -  Primary    Severe headache        Nausea        Relevant Medications    ondansetron (ZOFRAN) 4 mg tablet            Subjective:      Patient ID: Lex Em is a 32 y o  female      HPI    The following portions of the patient's history were reviewed and updated as appropriate: allergies, current medications, past family history, past medical history, past social history, past surgical history and problem list     Review of Systems      Objective:      /86 (BP Location: Left arm, Patient Position: Sitting, Cuff Size: Large)   Pulse 68   Temp 98 3 °F (36 8 °C) (Temporal)   Resp 16   Ht 5' 3 5" (1 613 m)   Wt 95 7 kg (211 lb)   LMP 03/29/2022   SpO2 98%   BMI 36 79 kg/m²          Physical Exam

## 2022-04-08 ENCOUNTER — HOSPITAL ENCOUNTER (EMERGENCY)
Facility: HOSPITAL | Age: 32
Discharge: HOME | End: 2022-04-08
Attending: EMERGENCY MEDICINE
Payer: COMMERCIAL

## 2022-04-08 ENCOUNTER — PATIENT MESSAGE (OUTPATIENT)
Dept: OBGYN CLINIC | Facility: CLINIC | Age: 32
End: 2022-04-08

## 2022-04-08 VITALS
TEMPERATURE: 98.6 F | SYSTOLIC BLOOD PRESSURE: 128 MMHG | HEART RATE: 71 BPM | DIASTOLIC BLOOD PRESSURE: 80 MMHG | OXYGEN SATURATION: 98 % | RESPIRATION RATE: 20 BRPM

## 2022-04-08 DIAGNOSIS — M54.2 CERVICAL MUSCLE PAIN: Primary | ICD-10-CM

## 2022-04-08 DIAGNOSIS — M43.6 TORTICOLLIS: ICD-10-CM

## 2022-04-08 DIAGNOSIS — N94.6 DYSMENORRHEA: ICD-10-CM

## 2022-04-08 PROCEDURE — 99283 EMERGENCY DEPT VISIT LOW MDM: CPT

## 2022-04-08 PROCEDURE — 99282 EMERGENCY DEPT VISIT SF MDM: CPT | Performed by: EMERGENCY MEDICINE

## 2022-04-08 RX ORDER — LIDOCAINE 50 MG/G
3 PATCH TOPICAL ONCE
Status: DISCONTINUED | OUTPATIENT
Start: 2022-04-08 | End: 2022-04-08 | Stop reason: HOSPADM

## 2022-04-08 RX ORDER — OXYCODONE HYDROCHLORIDE 5 MG/1
5 CAPSULE ORAL EVERY 6 HOURS PRN
Qty: 4 CAPSULE | Refills: 0 | Status: SHIPPED | OUTPATIENT
Start: 2022-04-08 | End: 2022-06-06

## 2022-04-08 RX ORDER — CYCLOBENZAPRINE HCL 10 MG
5 TABLET ORAL 3 TIMES DAILY PRN
Qty: 20 TABLET | Refills: 0 | Status: SHIPPED | OUTPATIENT
Start: 2022-04-08 | End: 2022-06-06

## 2022-04-08 RX ADMIN — LIDOCAINE 5% 3 PATCH: 700 PATCH TOPICAL at 10:39

## 2022-04-08 NOTE — Clinical Note
Cheyanne Menezes was seen and treated in our emergency department on 4/8/2022  Diagnosis:     Randi Paredes  may return to work on return date  She may return on this date: 04/11/2022         If you have any questions or concerns, please don't hesitate to call        Sony Peguero MD    ______________________________           _______________          _______________  Hospital Representative                              Date                                Time

## 2022-04-08 NOTE — ED PROVIDER NOTES
History  Chief Complaint   Patient presents with    Headache     pt c/o headache for the 5th day  blurred vision, vomiting  migraine cocktail and lidocaine injections in neck and head at [place], but came back  saw PCP yesterday with spinal tingling, cannot turn head, and PCP told pt to come back to ER     HPI    33 yo female with PMHx of anxiety, PTSD, [place] frank tooth dx presents with HA associated with neck stiffness  Patient was seen in the ED on 04/06/2022 for severe migraine (10/10) associated with photophobia, phonophobia  At time, she was given migraine cocktail and lidocaine injections in neck, head  Today, she complains of neck stiffness, right shoulder stiffness and reports improvement in her formerly severe migraine, now 3/10  She is able to drive, continue with ADLs  Of note, patient has had increased stress in her personal life recently  Reports no changes in meds/diet/infections since last few days  Denies fevers, chills, change in bowel/bladder, lower extremities' weakness  Prior to Admission Medications   Prescriptions Last Dose Informant Patient Reported?  Taking?   acetaminophen (TYLENOL) 325 mg tablet   No No   Sig: Take 2 tablets (650 mg total) by mouth every 4 (four) hours as needed for mild pain   Patient not taking: Reported on 1/11/2022    benzonatate (TESSALON PERLES) 100 mg capsule   No No   Sig: Take 1 capsule (100 mg total) by mouth 3 (three) times a day as needed for cough   Patient not taking: Reported on 3/22/2022    hydrOXYzine HCL (ATARAX) 25 mg tablet   No No   Sig: Take 1 tablet (25 mg total) by mouth every 6 (six) hours as needed for anxiety   ibuprofen (MOTRIN) 800 mg tablet   No No   Sig: Take 1 tablet (800 mg total) by mouth every 6 (six) hours as needed for moderate pain   norethindrone-ethinyl estradiol (Loestrin Fe 1/20) 1-20 MG-MCG per tablet   No No   Sig: Take 1 tablet by mouth daily   ondansetron (ZOFRAN) 4 mg tablet   No No   Sig: Take 1 tablet (4 mg total) by mouth every 8 (eight) hours as needed for nausea or vomiting   venlafaxine (EFFEXOR-XR) 75 mg 24 hr capsule   No No   Sig: Take 1 capsule (75 mg total) by mouth daily with breakfast      Facility-Administered Medications: None       Past Medical History:   Diagnosis Date    Anxiety     CMTD (Charcot-Rosy-Tooth disease)     Depression     Migraines     Mitral valve prolapse        Past Surgical History:   Procedure Laterality Date    ANKLE SURGERY      DILATION AND CURETTAGE OF UTERUS      TUBAL LIGATION      WISDOM TOOTH EXTRACTION         Family History   Problem Relation Age of Onset    Heart disease Mother     Other Mother         mutant gene "heart"    Mitral valve prolapse Mother     No Known Problems Father     Heart disease Maternal Grandmother      I have reviewed and agree with the history as documented  E-Cigarette/Vaping    E-Cigarette Use Never User      E-Cigarette/Vaping Substances    Nicotine No     THC No     CBD No     Flavoring No     Other No     Unknown No      Social History     Tobacco Use    Smoking status: Never Smoker    Smokeless tobacco: Never Used   Vaping Use    Vaping Use: Never used   Substance Use Topics    Alcohol use: Yes    Drug use: Yes     Types: Marijuana     Comment: medical card       Review of Systems   Constitutional: Negative for activity change, chills and fever  HENT: Negative for trouble swallowing and voice change  Eyes: Positive for photophobia  Negative for visual disturbance  Respiratory: Negative for cough, chest tightness and shortness of breath  Cardiovascular: Negative for chest pain and palpitations  Gastrointestinal: Positive for nausea  Negative for abdominal pain and vomiting  Genitourinary: Negative for difficulty urinating  Musculoskeletal: Positive for neck pain and neck stiffness  Negative for gait problem and myalgias  Neurological: Positive for headaches   Negative for dizziness, weakness and light-headedness  Psychiatric/Behavioral: The patient is nervous/anxious  Physical Exam  ED Triage Vitals [04/08/22 0922]   Temperature Pulse Respirations Blood Pressure SpO2   98 6 °F (37 °C) 71 20 128/80 98 %      Temp Source Heart Rate Source Patient Position - Orthostatic VS BP Location FiO2 (%)   Oral Monitor Sitting Right arm --      Pain Score       --           Orthostatic Vital Signs  Vitals:    04/08/22 0922   BP: 128/80   Pulse: 71   Patient Position - Orthostatic VS: Sitting       Physical Exam     Vitals reviewed  General Examination: Lying in bed, cooperative   HEENT: Normocephalic, Atraumatic  Extraocular movements intact, PERRLA  No tenderness, erythema, oozing noted on neck  Side to side ROM limited due to pain  CVS: S1, S2 noted  Lungs: CTA b/l  Abdomen: Soft, normal bowel sounds  Non distended, non tender  Ext: No edema noted  Psych: Though Process - logical    Skin: No bleeding/bruising noted  Neuro: A, Ox3  CN II-XII grossly intact  Motor strength 5/5 in all 4 extremities proximally, distally  Negative Brudzinski sign  Reflexes 0 throughout (hx of Charcot Jayce Dixon Dx)  ED Medications  Medications   lidocaine (LIDODERM) 5 % patch 3 patch (has no administration in time range)       Diagnostic Studies  Results Reviewed     None                 No orders to display         Procedures  Procedures      ED Course  ED Course as of 04/08/22 1028   Fri Apr 08, 2022   1028 Given lidoderm patch       MDM  Number of Diagnoses or Management Options  Diagnosis management comments: Low concern for infectious process  Most likely neck stiffness sequelae of previous severe migraine (in setting hx of PTSD, anxiety) versus [facility] pathology  Given lidoderm patch to help with neck stiffness        Disposition  Final diagnoses:   None     ED Disposition     None      Follow-up Information    None         Patient's Medications   Discharge Prescriptions    No medications on file     No discharge procedures on file  PDMP Review     None           ED Provider  Attending physically available and evaluated Aleta Joyner I managed the patient along with the ED Attending      Electronically Signed by         Hussein Small MD  04/08/22 4450

## 2022-04-08 NOTE — DISCHARGE INSTRUCTIONS
Diagnosis; right sided cervical muscle pain- likely torticollis - tightness- spasm of cervical muscle       - activity - range of motion of neck as tolerated as pain and stiffness subside-- can wear soft collar as needed for support - take off as needed    -  flexeril - 5 mg tablet- might be 1/2 tablet 3 times a day as needed   - is a muscle relaxant - might make you sleepy     - the lidocaine patches can remain on for up to 12 hrs at a time- can not get warm or wet     - only as needed for severe pain - oxycodone 1 tablet     - usually the pain ans tightness of neck will resolve over the course of 1-2 weeks     - please return to  the er for any worsening/ intractable neck pain - any new problems with vision/ talking/swallowing/balance  or any new/ worsening/concerning symptoms to you

## 2022-04-08 NOTE — ED NOTES
2 lidocaine patches placed to marked areas provided by ED attending  Pt opted to not have the third patch placed due to area on hairline and neck  Initially refused soft collar but will take it home with her       Jerzy Em RN  04/08/22 2531 Patient has appt in office on Tuesday

## 2022-04-11 DIAGNOSIS — N94.6 DYSMENORRHEA: Primary | ICD-10-CM

## 2022-04-11 DIAGNOSIS — N94.6 DYSMENORRHEA: ICD-10-CM

## 2022-04-11 RX ORDER — NORETHINDRONE ACETATE AND ETHINYL ESTRADIOL 1; .02 MG/1; MG/1
1 TABLET ORAL DAILY
Qty: 63 TABLET | Refills: 1 | Status: SHIPPED | OUTPATIENT
Start: 2022-04-11 | End: 2022-06-06 | Stop reason: DRUGHIGH

## 2022-04-11 RX ORDER — IBUPROFEN 800 MG/1
800 TABLET ORAL EVERY 6 HOURS PRN
Qty: 30 TABLET | Refills: 1 | Status: SHIPPED | OUTPATIENT
Start: 2022-04-11 | End: 2022-08-09 | Stop reason: SDUPTHER

## 2022-04-11 RX ORDER — IBUPROFEN 800 MG/1
800 TABLET ORAL EVERY 6 HOURS PRN
Qty: 30 TABLET | Refills: 0 | Status: CANCELLED | OUTPATIENT
Start: 2022-04-11

## 2022-04-11 NOTE — ED PROVIDER NOTES
Final Diagnosis:  1  Headache    2  Migraine         Chief Complaint   Patient presents with    Migraine     pt states she has a hx of migraines and has had a sever migraine for the past 3 days  ASSESSMENT + PLAN:   - Nursing note reviewed  1  HA  -patient with normal neurologic exam, reassuring, will give migraine cocktail, perform some lidocaine injections she does have some trigger points  -Reassess and mildly improved, will d/c    Procedures   Procedure  Nerve block    Date/Time: 4/6/2022 4:17 PM  Performed by: Jessi Anderson MD  Authorized by: Jessi Anderson MD   Universal Protocol:  Procedure performed by:  Consent: Verbal consent obtained  Indications:     Indications:  Pain relief  Location:     Body area:  Head    Head nerve:  Greater occipital    Laterality:  Left  Skin anesthesia (see MAR for exact dosages):     Skin anesthesia method:  Local infiltration    Local anesthetic:  Lidocaine 1% w/o epi  Procedure details (see MAR for exact dosages): Block needle gauge:  25 G    Anesthetic injected:  Lidocaine 1% w/o epi    Injection procedure:  Anatomic landmarks identified, introduced needle, incremental injection and anatomic landmarks palpated  Post-procedure details:     Dressing:  None    Patient tolerance of procedure:   Tolerated well, no immediate complications  Trigger Injection 1 or 2 muscles    Date/Time: 4/6/2022 4:17 PM  Performed by: Jessi Anderson MD  Authorized by: Jessi Anderson MD     Patient location:  ED  Other Assisting Provider: Yes (comment)    Consent:     Consent obtained:  Verbal    Consent given by:  Patient    Risks discussed:  Nerve damage, pain, intravenous injection and infection    Alternatives discussed:  No treatment  Location:     Body area:  Neck (Trapezius)    Injection Trigger Points:  2  Pre-procedure details:     Skin preparation:  Chloraprep  Skin anesthesia:     Skin anesthesia method:  Local infiltration    Local anesthetic:  Lidocaine 1% w/o epi  Procedure details:     Needle gauge:  27 G    Anesthetic injected:  Lidocaine 1% w/o epi    Steroid injected:  None    Additive injected:  None    Injection procedure:  Incremental injection, anatomic landmarks identified, anatomic landmarks palpated and negative aspiration for blood  Post-procedure details:     Dressing:  None    Outcome:  Pain improved    Patient tolerance of procedure: Tolerated well, no immediate complications                    Final Dispo   Patient was reassessed  Vital signs stable  Patient and/or family given discharge instructions and return precautions  Patient and/or family was reassured  The patient and/or family vocalizes understanding  Answered all of the patient's and/or family's questions  Will follow up with PCP  Patient and/or family are agreeable to the plan  Medications   metoclopramide (REGLAN) injection 10 mg (10 mg Intravenous Given 4/6/22 1603)   ketorolac (TORADOL) injection 15 mg (15 mg Intravenous Given 4/6/22 1603)   diphenhydrAMINE (BENADRYL) tablet 25 mg (25 mg Oral Given 4/6/22 1603)   lidocaine (PF) (XYLOCAINE-MPF) 1 % injection 10 mL (10 mL Infiltration Given by Other 4/6/22 1618)     Time reflects when diagnosis was documented in both MDM as applicable and the Disposition within this note     Time User Action Codes Description Comment    4/6/2022  4:26 PM Eyvonne Goods Add [R51 9] Headache     4/6/2022  4:26 PM Eyvonne Goods Add [G43 909] Migraine       ED Disposition     ED Disposition Condition Date/Time Comment    Discharge Stable Wed Apr 6, 2022  4:26 PM Harika Moreno discharge to home/self care              Follow-up Information     Follow up With Specialties Details Why Elisa [address] [facility]  Nurse Practitioner Call   [address] [facility] [address] [place] [id number] [phone]          Discharge Medication List as of 4/6/2022  4:27 PM      CONTINUE these medications which have NOT CHANGED    Details   acetaminophen (TYLENOL) 325 mg tablet Take 2 tablets (650 mg total) by mouth every 4 (four) hours as needed for mild pain, Starting Mon 5/31/2021, Normal      benzonatate (TESSALON PERLES) 100 mg capsule Take 1 capsule (100 mg total) by mouth 3 (three) times a day as needed for cough, Starting Mon 2/7/2022, Normal      hydrOXYzine HCL (ATARAX) 25 mg tablet Take 1 tablet (25 mg total) by mouth every 6 (six) hours as needed for anxiety, Starting Tue 3/22/2022, Normal      venlafaxine (EFFEXOR-XR) 75 mg 24 hr capsule Take 1 capsule (75 mg total) by mouth daily with breakfast, Starting Tue 2/1/2022, Normal      ibuprofen (MOTRIN) 800 mg tablet Take 1 tablet (800 mg total) by mouth every 6 (six) hours as needed for moderate pain, Starting u 1/27/2022, Normal      norethindrone-ethinyl estradiol (Loestrin Fe 1/20) 1-20 MG-MCG per tablet Take 1 tablet by mouth daily, Starting Tue 4/5/2022, Until Tue 6/28/2022, Normal           No discharge procedures on file  Prior to Admission Medications   Prescriptions Last Dose Informant Patient Reported? Taking?   acetaminophen (TYLENOL) 325 mg tablet   No No   Sig: Take 2 tablets (650 mg total) by mouth every 4 (four) hours as needed for mild pain   Patient not taking: Reported on 1/11/2022    benzonatate (TESSALON PERLES) 100 mg capsule   No No   Sig: Take 1 capsule (100 mg total) by mouth 3 (three) times a day as needed for cough   Patient not taking: Reported on 3/22/2022    hydrOXYzine HCL (ATARAX) 25 mg tablet   No No   Sig: Take 1 tablet (25 mg total) by mouth every 6 (six) hours as needed for anxiety   venlafaxine (EFFEXOR-XR) 75 mg 24 hr capsule   No No   Sig: Take 1 capsule (75 mg total) by mouth daily with breakfast      Facility-Administered Medications: None       History of Present Illness:    This is a 32 y o  female PMH significant for CMT coming in today with complaint of headache    Onset: Acute  Location: Headache (Frontal, initially occipital)  Duration: 2 days  Radiation: Back of head to front of head  Associated Symptoms:  Patient does have some pain in the back of her head, no neck pain, no associated neurologic changes, no dysphagia or vision changes, no changes in her speech  Severity: Mild to Moderate  Attempted Treatments: Tylenol/motrin  Historical Elements:  Patient says that he has had a headache that initially started in the back of her head and now is in the front  This is not the worst headache of her life and it did not come on suddenly or maximally in intensity  Has been getting worse  Does have associated stress at work  Relevant Social History  No IV drugs, does recreationally drink alcohol and use marijuana    - No language barrier    - History obtained from patient and chart   - Reviewed and documented relevant past medical/family/social history  - There are no limitations to the history obtained  - Previous charting was reviewed  Some data reviewed included below for ease of access whether or not it is relevant to this patient encounter  Past Medical, Past Surgical History:    has a past medical history of Anxiety, CMTD (Charcot-Rosy-Tooth disease), Depression, Migraines, and Mitral valve prolapse    has a past surgical history that includes Tubal ligation; Dilation and curettage of uterus; Ankle surgery; and [place] tooth extraction  Allergies: Allergies   Allergen Reactions    Sumatriptan Other (See Comments)     Weakness, arms felt heavy    Codeine GI Intolerance and Other (See Comments)     Migraines       Social and Family History:     Social History     Substance and Sexual Activity   Alcohol Use Yes     Social History     Tobacco Use   Smoking Status Never Smoker   Smokeless Tobacco Never Used     Social History     Substance and Sexual Activity   Drug Use Yes    Types: Marijuana    Comment: medical card       Review of Systems:   Review of Systems   Constitutional: Negative for chills, diaphoresis and fever  HENT: Negative  Eyes: Negative    Negative for visual disturbance  Respiratory: Negative  Negative for shortness of breath  Cardiovascular: Negative  Negative for chest pain  Gastrointestinal: Negative  Negative for abdominal pain, nausea and vomiting  Endocrine: Negative  Genitourinary: Negative  Musculoskeletal: Negative  Negative for myalgias  Skin: Negative  Negative for rash  Allergic/Immunologic: Negative  Neurological: Positive for headaches  Negative for weakness, light-headedness and numbness  Hematological: Negative  Psychiatric/Behavioral: Negative  All other systems reviewed and are negative  Physical Examination     Vitals:    04/06/22 1502   BP: 130/77   Pulse: (!) 54   Resp: 20   Temp: 97 5 °F (36 4 °C)   SpO2: 99%     Vitals reviewed by me  Physical Exam  Vitals and nursing note reviewed  Constitutional:       Appearance: She is well-developed  HENT:      Head: Normocephalic and atraumatic  Eyes:      Extraocular Movements: Extraocular movements intact  Conjunctiva/sclera: Conjunctivae normal       Pupils: Pupils are equal, round, and reactive to light  Cardiovascular:      Rate and Rhythm: Normal rate and regular rhythm  Pulmonary:      Effort: Pulmonary effort is normal       Breath sounds: Normal breath sounds  Abdominal:      General: Bowel sounds are normal  There is no distension  Palpations: Abdomen is soft  Tenderness: There is no abdominal tenderness  Musculoskeletal:         General: Normal range of motion  Cervical back: Normal range of motion and neck supple  Skin:     General: Skin is warm and dry  Findings: No lesion  Neurological:      General: No focal deficit present  Mental Status: She is alert and oriented to person, place, and time  GCS: GCS eye subscore is 4  GCS verbal subscore is 5  GCS motor subscore is 6  Cranial Nerves: No cranial nerve deficit  Sensory: No sensory deficit  Motor: No weakness        Coordination: Coordination normal       Gait: Gait normal             Risk Stratification Tools                   No orders to display     Orders Placed This Encounter   Procedures    Nerve block    Trigger Injection 1 or 2 muscles       Labs:   Labs Reviewed - No data to display    Imaging:   No results found  Final Diagnosis:  1  Headache    2  Migraine        Code Status: No Order    Portions of the record may have been created with voice recognition software  Occasional wrong word or "sound a like" substitutions may have occurred due to the inherent limitations of voice recognition software  Read the chart carefully and recognize, using context, where substitutions have occurred      Electronically signed by:  Allyson Rodriguez, PGY 3, MD Ana Mathews MD  04/11/22 1385

## 2022-04-11 NOTE — TELEPHONE ENCOUNTER
Pt is taking Loestrin 1/20 I continuous fashion x 3 months - please sign off on presc to [facility]

## 2022-04-16 NOTE — ED ATTENDING ATTESTATION
4/8/2022  IMat MD, saw and evaluated the patient  I have discussed the patient with the resident/non-physician practitioner and agree with the resident's/non-physician practitioner's findings, Plan of Care, and MDM as documented in the resident's/non-physician practitioner's note, except where noted  All available labs and Radiology studies were reviewed  I was present for key portions of any procedure(s) performed by the resident/non-physician practitioner and I was immediately available to provide assistance  At this point I agree with the current assessment done in the Emergency Department    I have conducted an independent evaluation of this patient a history and physical is as follows:see h and p above     ED Course  ED Course as of 04/16/22 1430   Fri Apr 08, 2022   1144 - er md note- pt- re-evaluated- feels improved with pain -- will d/c          Critical Care Time  Procedures

## 2022-05-17 DIAGNOSIS — R10.2 PELVIC PAIN: Primary | ICD-10-CM

## 2022-05-17 DIAGNOSIS — N93.8 DUB (DYSFUNCTIONAL UTERINE BLEEDING): ICD-10-CM

## 2022-05-23 ENCOUNTER — OFFICE VISIT (OUTPATIENT)
Dept: FAMILY MEDICINE CLINIC | Facility: CLINIC | Age: 32
End: 2022-05-23
Payer: COMMERCIAL

## 2022-05-23 VITALS
DIASTOLIC BLOOD PRESSURE: 78 MMHG | SYSTOLIC BLOOD PRESSURE: 114 MMHG | BODY MASS INDEX: 37.05 KG/M2 | WEIGHT: 217 LBS | HEIGHT: 64 IN | TEMPERATURE: 97.5 F | RESPIRATION RATE: 18 BRPM | HEART RATE: 74 BPM | OXYGEN SATURATION: 96 %

## 2022-05-23 DIAGNOSIS — F32.0 CURRENT MILD EPISODE OF MAJOR DEPRESSIVE DISORDER, UNSPECIFIED WHETHER RECURRENT (HCC): ICD-10-CM

## 2022-05-23 PROCEDURE — 99213 OFFICE O/P EST LOW 20 MIN: CPT | Performed by: NURSE PRACTITIONER

## 2022-05-23 RX ORDER — VENLAFAXINE HYDROCHLORIDE 150 MG/1
150 CAPSULE, EXTENDED RELEASE ORAL
Qty: 90 CAPSULE | Refills: 1 | Status: SHIPPED | OUTPATIENT
Start: 2022-05-23

## 2022-05-23 NOTE — PROGRESS NOTES
Assessment/Plan:  Depression  Patient seeing today to discuss on increasing her venlafaxine  Patient had been on 150 mg in the past on but she states she does not feel like this 75 mg which we initially started to restart her on this medication it is working well for her  Patient is aware of side effects as she had been on the 150 mg on prior to coming to this practice  On patient's states that the 75 is working but not well enough  Patient declines any on interest in changing from venlafaxine  She denies any thoughts of harming anyone or herself  Did advise will start the 150 mg on Friday  She is to contact me if she develops any extrapyramidal symptoms which were thoroughly discussed  All questions were answered patient is advised to return to office as needed  Dosing all possible side effects of the prescribed medications or medications that had been prescribed in the past were reviewed and all questions were answered  Patient verbalized agreement and understanding of the plan of care as outlined during the office visit today return to office as indicated or sooner if a problem arises  Problem List Items Addressed This Visit    None     Visit Diagnoses     Current mild episode of major depressive disorder, unspecified whether recurrent (HCC)        Relevant Medications    venlafaxine (EFFEXOR-XR) 150 mg 24 hr capsule            Subjective:      Patient ID: Harika Moreno is a 32 y o  female  HPI    The following portions of the patient's history were reviewed and updated as appropriate: allergies, current medications, past family history, past medical history, past social history, past surgical history and problem list     Review of Systems   Constitutional: Negative for appetite change and fever  HENT: Negative for sinus pressure and sore throat  Eyes: Negative for pain  Respiratory: Negative for shortness of breath  Cardiovascular: Negative for chest pain     Gastrointestinal: Negative for abdominal pain  Genitourinary: Negative for dysuria  Musculoskeletal: Negative for arthralgias and myalgias  Skin: Negative for color change  Neurological: Negative for light-headedness  Psychiatric/Behavioral: Negative for behavioral problems  Objective:      /78 (BP Location: Left arm, Patient Position: Sitting, Cuff Size: Large)   Pulse 74   Temp 97 5 °F (36 4 °C) (Temporal)   Resp 18   Ht 5' 3 5" (1 613 m)   Wt 98 4 kg (217 lb)   SpO2 96%   BMI 37 84 kg/m²          Physical Exam  Vitals and nursing note reviewed  Constitutional:       General: She is not in acute distress  Appearance: She is well-developed  She is not diaphoretic  HENT:      Head: Normocephalic and atraumatic  Eyes:      Pupils: Pupils are equal, round, and reactive to light  Cardiovascular:      Rate and Rhythm: Normal rate and regular rhythm  Heart sounds: Normal heart sounds  Pulmonary:      Effort: Pulmonary effort is normal       Breath sounds: Normal breath sounds  Abdominal:      Palpations: Abdomen is soft  Musculoskeletal:         General: Normal range of motion  Cervical back: Normal range of motion  Skin:     General: Skin is dry  Neurological:      General: No focal deficit present  Mental Status: She is alert and oriented to person, place, and time  Psychiatric:         Behavior: Behavior normal          Thought Content:  Thought content normal

## 2022-05-25 ENCOUNTER — HOSPITAL ENCOUNTER (OUTPATIENT)
Dept: RADIOLOGY | Facility: HOSPITAL | Age: 32
Discharge: HOME | End: 2022-05-25
Payer: COMMERCIAL

## 2022-05-25 DIAGNOSIS — R10.2 PELVIC PAIN: ICD-10-CM

## 2022-05-25 DIAGNOSIS — N93.8 DUB (DYSFUNCTIONAL UTERINE BLEEDING): ICD-10-CM

## 2022-05-25 PROCEDURE — 76830 TRANSVAGINAL US NON-OB: CPT

## 2022-05-25 PROCEDURE — 76856 US EXAM PELVIC COMPLETE: CPT

## 2022-06-06 ENCOUNTER — OFFICE VISIT (OUTPATIENT)
Dept: OBGYN CLINIC | Facility: CLINIC | Age: 32
End: 2022-06-06
Payer: COMMERCIAL

## 2022-06-06 VITALS
WEIGHT: 217.6 LBS | BODY MASS INDEX: 38.55 KG/M2 | DIASTOLIC BLOOD PRESSURE: 84 MMHG | HEIGHT: 63 IN | SYSTOLIC BLOOD PRESSURE: 118 MMHG

## 2022-06-06 DIAGNOSIS — N92.1 BREAKTHROUGH BLEEDING ON BIRTH CONTROL PILLS: Primary | ICD-10-CM

## 2022-06-06 PROCEDURE — 99212 OFFICE O/P EST SF 10 MIN: CPT | Performed by: NURSE PRACTITIONER

## 2022-06-06 RX ORDER — NORETHINDRONE ACETATE AND ETHINYL ESTRADIOL .03; 1.5 MG/1; MG/1
1 TABLET ORAL DAILY
Qty: 63 TABLET | Refills: 1 | Status: SHIPPED | OUTPATIENT
Start: 2022-06-06 | End: 2022-07-07 | Stop reason: ALTCHOICE

## 2022-06-06 NOTE — PROGRESS NOTES
Gil Turner is a 32 y o  female who presents for contraception follow-up  She was started on OCP 5 months for dysmenorrhea  When she started her OCP's she took the first pack with the placebo pill and was still experiencing dysmenorrhea  She then started skipping the placebo week in March  In April she had no menses and no pain  On May 8th she started the new pack and since the  she has bleeding  In the beginning the bleeding was very heavy and then started to fluctuated from heavy to light  I had advised her to double up on her OCP on May 31st for 7 days  Tomorrow will be a week that she has been taking 2 pills  Bleeding now is spotting with wiping  The entire time she has been bleeding she has been experiencing menstrual symptoms which include back and hip pain, pelvic cramping  Pelvic US on 2022: Endometrial lining 6 mm  Normal ovaries  Retoflexed uterus  Menstrual History:  OB History        4    Para   3    Term   3            AB   1    Living   3       SAB   1    IAB        Ectopic        Multiple        Live Births   3                  Patient's last menstrual period was 2022 (exact date)  The following portions of the patient's history were reviewed and updated as appropriate: allergies, current medications, past family history, past medical history, past social history, past surgical history and problem list     Review of Systems  Pertinent items are noted in HPI  Objective      /84   Ht 5' 3" (1 6 m)   Wt 98 7 kg (217 lb 9 6 oz)   LMP 2022 (Exact Date)   BMI 38 55 kg/m²       Assessment and Plan    Laila Leung was seen today for vaginal bleeding  Diagnoses and all orders for this visit:    Breakthrough bleeding on birth control pills  -     Norethindrone Acet-Ethinyl Est 1 5-30 MG-MCG TABS; Take 1 tablet by mouth in the morning      She will be completing her pack today   We discussed increasing her dose to see if helps her menstrual bleeding pattern  She is to start her new pack tomorrow which will be Loestrin 1 5/30 (21)  Follow-up in 3 months or sooner if needed

## 2022-06-08 ENCOUNTER — APPOINTMENT (EMERGENCY)
Dept: RADIOLOGY | Facility: HOSPITAL | Age: 32
End: 2022-06-08
Payer: COMMERCIAL

## 2022-06-08 ENCOUNTER — HOSPITAL ENCOUNTER (EMERGENCY)
Facility: HOSPITAL | Age: 32
Discharge: HOME | End: 2022-06-08
Attending: EMERGENCY MEDICINE
Payer: COMMERCIAL

## 2022-06-08 VITALS
DIASTOLIC BLOOD PRESSURE: 59 MMHG | SYSTOLIC BLOOD PRESSURE: 100 MMHG | TEMPERATURE: 98.2 F | RESPIRATION RATE: 18 BRPM | OXYGEN SATURATION: 99 % | HEART RATE: 62 BPM

## 2022-06-08 DIAGNOSIS — K52.9 GASTROENTERITIS: Primary | ICD-10-CM

## 2022-06-08 LAB
ALBUMIN SERPL BCP-MCNC: 3.4 G/DL (ref 3.5–5)
ALP SERPL-CCNC: 41 U/L (ref 46–116)
ALT SERPL W P-5'-P-CCNC: 18 U/L (ref 12–78)
ANION GAP SERPL CALCULATED.3IONS-SCNC: 7 MMOL/L (ref 4–13)
AST SERPL W P-5'-P-CCNC: 11 U/L (ref 5–45)
BACTERIA UR QL AUTO: ABNORMAL /HPF
BASOPHILS # BLD AUTO: 0.02 THOUSANDS/ΜL (ref 0–0.1)
BASOPHILS NFR BLD AUTO: 0 % (ref 0–1)
BILIRUB SERPL-MCNC: 0.44 MG/DL (ref 0.2–1)
BILIRUB UR QL STRIP: NEGATIVE
BUN SERPL-MCNC: 8 MG/DL (ref 5–25)
CALCIUM ALBUM COR SERPL-MCNC: 9.5 MG/DL (ref 8.3–10.1)
CALCIUM SERPL-MCNC: 9 MG/DL (ref 8.3–10.1)
CHLORIDE SERPL-SCNC: 109 MMOL/L (ref 100–108)
CLARITY UR: CLEAR
CO2 SERPL-SCNC: 24 MMOL/L (ref 21–32)
COLOR UR: ABNORMAL
CREAT SERPL-MCNC: 0.55 MG/DL (ref 0.6–1.3)
EOSINOPHIL # BLD AUTO: 0.08 THOUSAND/ΜL (ref 0–0.61)
EOSINOPHIL NFR BLD AUTO: 1 % (ref 0–6)
ERYTHROCYTE [DISTWIDTH] IN BLOOD BY AUTOMATED COUNT: 13.3 % (ref 11.6–15.1)
GFR SERPL CREATININE-BSD FRML MDRD: 125 ML/MIN/1.73SQ M
GLUCOSE SERPL-MCNC: 79 MG/DL (ref 65–140)
GLUCOSE UR STRIP-MCNC: NEGATIVE MG/DL
HCG SERPL QL: NEGATIVE
HCT VFR BLD AUTO: 39.2 % (ref 34.8–46.1)
HGB BLD-MCNC: 13.4 G/DL (ref 11.5–15.4)
HGB UR QL STRIP.AUTO: ABNORMAL
IMM GRANULOCYTES # BLD AUTO: 0.03 THOUSAND/UL (ref 0–0.2)
IMM GRANULOCYTES NFR BLD AUTO: 0 % (ref 0–2)
KETONES UR STRIP-MCNC: NEGATIVE MG/DL
LEUKOCYTE ESTERASE UR QL STRIP: ABNORMAL
LIPASE SERPL-CCNC: 90 U/L (ref 73–393)
LYMPHOCYTES # BLD AUTO: 1.58 THOUSANDS/ΜL (ref 0.6–4.47)
LYMPHOCYTES NFR BLD AUTO: 23 % (ref 14–44)
MCH RBC QN AUTO: 29.3 PG (ref 26.8–34.3)
MCHC RBC AUTO-ENTMCNC: 34.2 G/DL (ref 31.4–37.4)
MCV RBC AUTO: 86 FL (ref 82–98)
MONOCYTES # BLD AUTO: 0.49 THOUSAND/ΜL (ref 0.17–1.22)
MONOCYTES NFR BLD AUTO: 7 % (ref 4–12)
MUCOUS THREADS UR QL AUTO: ABNORMAL
NEUTROPHILS # BLD AUTO: 4.8 THOUSANDS/ΜL (ref 1.85–7.62)
NEUTS SEG NFR BLD AUTO: 69 % (ref 43–75)
NITRITE UR QL STRIP: NEGATIVE
NON-SQ EPI CELLS URNS QL MICRO: ABNORMAL /HPF
NRBC BLD AUTO-RTO: 0 /100 WBCS
PH UR STRIP.AUTO: 5.5 [PH]
PLATELET # BLD AUTO: 287 THOUSANDS/UL (ref 149–390)
PMV BLD AUTO: 9.3 FL (ref 8.9–12.7)
POTASSIUM SERPL-SCNC: 3.6 MMOL/L (ref 3.5–5.3)
PROT SERPL-MCNC: 7.5 G/DL (ref 6.4–8.2)
PROT UR STRIP-MCNC: NEGATIVE MG/DL
RBC # BLD AUTO: 4.57 MILLION/UL (ref 3.81–5.12)
RBC #/AREA URNS AUTO: ABNORMAL /HPF
SODIUM SERPL-SCNC: 140 MMOL/L (ref 136–145)
SP GR UR STRIP.AUTO: 1.02 (ref 1–1.03)
UROBILINOGEN UR STRIP-ACNC: <2 MG/DL
WBC # BLD AUTO: 7 THOUSAND/UL (ref 4.31–10.16)
WBC #/AREA URNS AUTO: ABNORMAL /HPF

## 2022-06-08 PROCEDURE — 36415 COLL VENOUS BLD VENIPUNCTURE: CPT | Performed by: EMERGENCY MEDICINE

## 2022-06-08 PROCEDURE — 74176 CT ABD & PELVIS W/O CONTRAST: CPT

## 2022-06-08 PROCEDURE — 84703 CHORIONIC GONADOTROPIN ASSAY: CPT | Performed by: EMERGENCY MEDICINE

## 2022-06-08 PROCEDURE — 96374 THER/PROPH/DIAG INJ IV PUSH: CPT

## 2022-06-08 PROCEDURE — 99284 EMERGENCY DEPT VISIT MOD MDM: CPT | Performed by: EMERGENCY MEDICINE

## 2022-06-08 PROCEDURE — 83690 ASSAY OF LIPASE: CPT | Performed by: EMERGENCY MEDICINE

## 2022-06-08 PROCEDURE — 96375 TX/PRO/DX INJ NEW DRUG ADDON: CPT

## 2022-06-08 PROCEDURE — 99284 EMERGENCY DEPT VISIT MOD MDM: CPT

## 2022-06-08 PROCEDURE — 81001 URINALYSIS AUTO W/SCOPE: CPT | Performed by: EMERGENCY MEDICINE

## 2022-06-08 PROCEDURE — G1004 CDSM NDSC: HCPCS

## 2022-06-08 PROCEDURE — 85025 COMPLETE CBC W/AUTO DIFF WBC: CPT | Performed by: EMERGENCY MEDICINE

## 2022-06-08 PROCEDURE — 80053 COMPREHEN METABOLIC PANEL: CPT | Performed by: EMERGENCY MEDICINE

## 2022-06-08 PROCEDURE — 96361 HYDRATE IV INFUSION ADD-ON: CPT

## 2022-06-08 RX ORDER — FAMOTIDINE 10 MG/ML
20 INJECTION, SOLUTION INTRAVENOUS ONCE
Status: COMPLETED | OUTPATIENT
Start: 2022-06-08 | End: 2022-06-08

## 2022-06-08 RX ORDER — ONDANSETRON 2 MG/ML
4 INJECTION INTRAMUSCULAR; INTRAVENOUS ONCE
Status: COMPLETED | OUTPATIENT
Start: 2022-06-08 | End: 2022-06-08

## 2022-06-08 RX ORDER — KETOROLAC TROMETHAMINE 30 MG/ML
15 INJECTION, SOLUTION INTRAMUSCULAR; INTRAVENOUS ONCE
Status: COMPLETED | OUTPATIENT
Start: 2022-06-08 | End: 2022-06-08

## 2022-06-08 RX ADMIN — FAMOTIDINE 20 MG: 10 INJECTION INTRAVENOUS at 08:46

## 2022-06-08 RX ADMIN — KETOROLAC TROMETHAMINE 15 MG: 30 INJECTION, SOLUTION INTRAMUSCULAR; INTRAVENOUS at 07:46

## 2022-06-08 RX ADMIN — ONDANSETRON 4 MG: 2 INJECTION INTRAMUSCULAR; INTRAVENOUS at 07:47

## 2022-06-08 RX ADMIN — SODIUM CHLORIDE 1000 ML: 0.9 INJECTION, SOLUTION INTRAVENOUS at 07:46

## 2022-06-08 NOTE — DISCHARGE INSTRUCTIONS
You have been seen for gastroenteritis  You should return to the ED if you develop persistent vomiting, worsening abdominal pain especially in the right lower quadrant, or other worsening symptoms  Follow up with your primary care physician  Take Tylenol and Pepcid for abdominal pain  Stay well hydrated

## 2022-06-08 NOTE — ED PROVIDER NOTES
History  Chief Complaint   Patient presents with    Abdominal Pain     Generalized abd pain since Sunday  Diarrhea for the first two days which has subsided  Endorses nausea when pain is severe      19-year-old female with a past medical history of hyperlipidemia and migraines presents with abdominal pain  Patient states that the pain began on Sunday  However, she was only having pain when she had diarrhea  Patient reports multiple episodes of watery diarrhea a day  Yesterday she started taking Imodium and the diarrhea has subsided  Patient did not need to take Imodium today  She reports that yesterday, the pain became more severe  It is mostly in the upper abdomen, but radiates the lower abdomen  It is a constant pain, but randomly becomes very sharp  She states that when it becomes very sharp, it feels like a stabbing pain in her abdomen and the middle of her back  Patient tried taking ibuprofen and oxycodone for the pain, and reports that the oxycodone only helped for a little while  She gets nauseous when the pain gets bad  No vomiting or fevers  No sick contacts  Patient states that today she has a generalized tension type headache  She believes this is secondary to the oxycodone  No urinary symptoms  Patient reports vaginal spotting for the past month, but recently saw her OBGYN and they switched her birth control to try to stop this  Prior to Admission Medications   Prescriptions Last Dose Informant Patient Reported? Taking?    Norethindrone Acet-Ethinyl Est 1 5-30 MG-MCG TABS   No No   Sig: Take 1 tablet by mouth in the morning   hydrOXYzine HCL (ATARAX) 25 mg tablet   No No   Sig: Take 1 tablet (25 mg total) by mouth every 6 (six) hours as needed for anxiety   ibuprofen (MOTRIN) 800 mg tablet   No No   Sig: Take 1 tablet (800 mg total) by mouth every 6 (six) hours as needed for moderate pain   venlafaxine (EFFEXOR-XR) 150 mg 24 hr capsule   No No   Sig: Take 1 capsule (150 mg total) by mouth daily with breakfast      Facility-Administered Medications: None       Past Medical History:   Diagnosis Date    Anxiety     CMTD (Charcot-Rosy-Tooth disease)     Depression     Migraines     Mitral valve prolapse        Past Surgical History:   Procedure Laterality Date    ANKLE SURGERY      DILATION AND CURETTAGE OF UTERUS      TUBAL LIGATION      WISDOM TOOTH EXTRACTION         Family History   Problem Relation Age of Onset    Heart disease Mother     Other Mother         mutant gene "heart"    Mitral valve prolapse Mother     No Known Problems Father     Heart disease Maternal Grandmother      I have reviewed and agree with the history as documented  E-Cigarette/Vaping    E-Cigarette Use Never User      E-Cigarette/Vaping Substances    Nicotine No     THC No     CBD No     Flavoring No     Other No     Unknown No      Social History     Tobacco Use    Smoking status: Never Smoker    Smokeless tobacco: Never Used   Vaping Use    Vaping Use: Never used   Substance Use Topics    Alcohol use: Yes     Comment: social    Drug use: Yes     Types: Marijuana     Comment: medical card        Review of Systems   Constitutional: Negative for chills, fatigue and fever  HENT: Negative for congestion, rhinorrhea and sore throat  Eyes: Negative for pain and redness  Respiratory: Negative for cough, chest tightness, shortness of breath and wheezing  Cardiovascular: Negative for chest pain and palpitations  Gastrointestinal: Positive for abdominal pain, diarrhea and nausea  Negative for vomiting  Endocrine: Negative  Genitourinary: Negative for difficulty urinating, dysuria and hematuria  Musculoskeletal: Negative for back pain and myalgias  Skin: Negative for pallor and rash  Allergic/Immunologic: Negative  Neurological: Positive for headaches  Negative for dizziness, weakness and light-headedness  Hematological: Negative          Physical Exam  ED Triage Vitals   Temperature Pulse Respirations Blood Pressure SpO2   06/08/22 0715 06/08/22 0713 06/08/22 0713 06/08/22 0713 06/08/22 0713   98 2 °F (36 8 °C) 79 18 123/78 97 %      Temp Source Heart Rate Source Patient Position - Orthostatic VS BP Location FiO2 (%)   06/08/22 0715 06/08/22 0713 06/08/22 0713 06/08/22 0713 --   Oral Monitor Lying Right arm       Pain Score       06/08/22 0713       8             Orthostatic Vital Signs  Vitals:    06/08/22 0713 06/08/22 0830 06/08/22 1030   BP: 123/78 111/72 100/59   Pulse: 79 66 62   Patient Position - Orthostatic VS: Lying         Physical Exam  Vitals and nursing note reviewed  Constitutional:       Appearance: Normal appearance  She is well-developed  She is not ill-appearing  HENT:      Head: Normocephalic and atraumatic  Eyes:      Conjunctiva/sclera: Conjunctivae normal    Cardiovascular:      Rate and Rhythm: Normal rate and regular rhythm  Heart sounds: No murmur heard  Pulmonary:      Effort: Pulmonary effort is normal  No respiratory distress  Breath sounds: Normal breath sounds  Abdominal:      General: Abdomen is flat  There is no distension  Palpations: Abdomen is soft  Tenderness: There is generalized abdominal tenderness  There is no guarding or rebound  Musculoskeletal:         General: Normal range of motion  Cervical back: Normal range of motion and neck supple  Skin:     General: Skin is warm and dry  Neurological:      General: No focal deficit present  Mental Status: She is alert and oriented to person, place, and time           ED Medications  Medications   sodium chloride 0 9 % bolus 1,000 mL (0 mL Intravenous Stopped 6/8/22 0846)   ketorolac (TORADOL) injection 15 mg (15 mg Intravenous Given 6/8/22 0746)   ondansetron (ZOFRAN) injection 4 mg (4 mg Intravenous Given 6/8/22 0747)   Famotidine (PF) (PEPCID) injection 20 mg (20 mg Intravenous Given 6/8/22 0846)       Diagnostic Studies  Results Reviewed Procedure Component Value Units Date/Time    Lipase [[id number]]  (Normal) Collected: 06/08/22 0751    Lab Status: Final result Specimen: Blood from Arm, Right Updated: 06/08/22 0934     Lipase 90 u/L     hCG, qualitative pregnancy [[id number]]  (Normal) Collected: 06/08/22 0751    Lab Status: Final result Specimen: Blood from Arm, Right Updated: 06/08/22 0934     Preg, Serum Negative    Comprehensive metabolic panel [[id number]]  (Abnormal) Collected: 06/08/22 0751    Lab Status: Final result Specimen: Blood from Arm, Right Updated: 06/08/22 0901     Sodium 140 mmol/L      Potassium 3 6 mmol/L      Chloride 109 mmol/L      CO2 24 mmol/L      ANION GAP 7 mmol/L      BUN 8 mg/dL      Creatinine 0 55 mg/dL      Glucose 79 mg/dL      Calcium 9 0 mg/dL      Corrected Calcium 9 5 mg/dL      AST 11 U/L      ALT 18 U/L      Alkaline Phosphatase 41 U/L      Total Protein 7 5 g/dL      Albumin 3 4 g/dL      Total Bilirubin 0 44 mg/dL      eGFR 125 ml/min/1 73sq m     Narrative:      National Kidney Disease Foundation guidelines for Chronic Kidney Disease (CKD):     Stage 1 with normal or high GFR (GFR > 90 mL/min/1 73 square meters)    Stage 2 Mild CKD (GFR = 60-89 mL/min/1 73 square meters)    Stage 3A Moderate CKD (GFR = 45-59 mL/min/1 73 square meters)    Stage 3B Moderate CKD (GFR = 30-44 mL/min/1 73 square meters)    Stage 4 Severe CKD (GFR = 15-29 mL/min/1 73 square meters)    Stage 5 End Stage CKD (GFR <15 mL/min/1 73 square meters)  Note: GFR calculation is accurate only with a steady state creatinine    Urine Microscopic [[id number]]  (Abnormal) Collected: 06/08/22 0754    Lab Status: Final result Specimen: Urine, Clean Catch Updated: 06/08/22 0809     RBC, UA 1-2 /hpf      WBC, UA 2-4 /hpf      Epithelial Cells Occasional /hpf      Bacteria, UA Occasional /hpf      MUCUS THREADS Occasional    UA w Reflex to Microscopic w Reflex to Culture [[id number]]  (Abnormal) Collected: 06/08/22 0754    Lab Status: Final result Specimen: Urine, Clean Catch Updated: 06/08/22 0807     Color, UA Light Yellow     Clarity, UA Clear     Specific Gravity, UA 1 016     pH, UA 5 5     Leukocytes, UA Trace     Nitrite, UA Negative     Protein, UA Negative mg/dl      Glucose, UA Negative mg/dl      Ketones, UA Negative mg/dl      Urobilinogen, UA <2 0 mg/dl      Bilirubin, UA Negative     Blood, UA Small    CBC and differential [[id number]] Collected: 06/08/22 0751    Lab Status: Final result Specimen: Blood from Arm, Right Updated: 06/08/22 0805     WBC 7 00 Thousand/uL      RBC 4 57 Million/uL      Hemoglobin 13 4 g/dL      Hematocrit 39 2 %      MCV 86 fL      MCH 29 3 pg      MCHC 34 2 g/dL      RDW 13 3 %      MPV 9 3 fL      Platelets 376 Thousands/uL      nRBC 0 /100 WBCs      Neutrophils Relative 69 %      Immat GRANS % 0 %      Lymphocytes Relative 23 %      Monocytes Relative 7 %      Eosinophils Relative 1 %      Basophils Relative 0 %      Neutrophils Absolute 4 80 Thousands/µL      Immature Grans Absolute 0 03 Thousand/uL      Lymphocytes Absolute 1 58 Thousands/µL      Monocytes Absolute 0 49 Thousand/µL      Eosinophils Absolute 0 08 Thousand/µL      Basophils Absolute 0 02 Thousands/µL                  CT abdomen pelvis wo contrast   Final Result by Shereen Edge MD (06/08 1051)      No acute intra-abdominal abnormality  Workstation performed: [id number]               Procedures  Procedures      ED Course                                       MDM  Number of Diagnoses or Management Options  Gastroenteritis: established and improving  Diagnosis management comments: 35-year-old female presents with generalized abdominal pain and diarrhea  Patient is extremely tender on exam and has tenderness with minimal movement  Will pursue labs, urine, pregnancy, and CT abdomen pelvis  Will give IV fluids, Toradol, and Zofran and re-evaluate         Amount and/or Complexity of Data Reviewed  Clinical lab tests: ordered and reviewed  Tests in the radiology section of CPT®: ordered and reviewed  Review and summarize past medical records: yes  Discuss the patient with other providers: yes    Risk of Complications, Morbidity, and/or Mortality  Presenting problems: moderate  Diagnostic procedures: moderate  Management options: moderate    Patient Progress  Patient progress: improved    Patient felt better after Pepcid  Recommend supportive care at home  Recommend follow up with primary care physician  Return precautions given  All questions answered  Continue Pepcid and Tylenol  Disposition  Final diagnoses:   Gastroenteritis     Time reflects when diagnosis was documented in both MDM as applicable and the Disposition within this note     Time User Action Codes Description Comment    6/8/2022 11:13 AM Nakul Neumann Add [K52 9] Gastroenteritis       ED Disposition     ED Disposition   Discharge    Condition   Good    Date/Time   Wed Jun 8, 2022 11:13 AM    Comment   Holly Gudino discharge to home/self care  Follow-up Information     Follow up With Specialties Details [address] [facility]  Nurse Practitioner   [id number] [facility] [address] [place] [address] [address] [place] [id number]  [phone]            Patient's Medications   Discharge Prescriptions    No medications on file     No discharge procedures on file  PDMP Review     None           ED Provider  Attending physically available and evaluated Holly Gudino I managed the patient along with the ED Attending      Electronically Signed by         Nolvia Steiner DO  06/08/22 1127

## 2022-06-08 NOTE — Clinical Note
Gearl Knife was seen and treated in our emergency department on 6/8/2022  Diagnosis:     Venu Morris  may return to work on return date  She may return on this date: 06/10/2022    Patient will be off from work from 6/7-6/9     If you have any questions or concerns, please don't hesitate to call        Jourdan Blount DO    ______________________________           _______________          _______________  Hospital Representative                              Date                                Time

## 2022-06-13 NOTE — ED ATTENDING ATTESTATION
6/8/2022  IFelicita DO, saw and evaluated the patient  I have discussed the patient with the resident/non-physician practitioner and agree with the resident's/non-physician practitioner's findings, Plan of Care, and MDM as documented in the resident's/non-physician practitioner's note, except where noted  All available labs and Radiology studies were reviewed  I was present for key portions of any procedure(s) performed by the resident/non-physician practitioner and I was immediately available to provide assistance  At this point I agree with the current assessment done in the Emergency Department  I have conducted an independent evaluation of this patient a history and physical is as follows:    59-year-old female presents abdominal pain since Sunday  Patient reports started with diarrhea, has nausea with severe pain  Yesterday pain became more severe  Mostly in the upper abdomen but radiates the lower abdomen  Tried taking ibuprofen as well as oxycodone for pain  No fevers, no vomiting    On exam-no acute distress, heart regular, no respiratory distress, abdomen soft with generalized abdominal tenderness plan- CT abdomen, check labs, check urine, treat pain and reassess    ED Course         Critical Care Time  Procedures

## 2022-06-29 ENCOUNTER — TELEPHONE (OUTPATIENT)
Dept: OBGYN CLINIC | Facility: CLINIC | Age: 32
End: 2022-06-29

## 2022-06-29 NOTE — TELEPHONE ENCOUNTER
Pt was changed from Loestrin 1/20 to Loestrin 1 5/30 - completed 1st pill pack new ocp 6/28/2022 - was having BTB on prev ocp & still having BTB (some days spotting, some days light flow)  No pills missed or delayed  She had doubled up on previous ocps x 1 wk before starting new ocp  Also sometimes has heavier bleeding after intercourse  Sometimes has mucous discharge with odor & feels she has to shower more frequently  She is taking ocps in continuous fashion x 3 months

## 2022-07-07 ENCOUNTER — PROCEDURE VISIT (OUTPATIENT)
Dept: OBGYN CLINIC | Facility: CLINIC | Age: 32
End: 2022-07-07
Payer: COMMERCIAL

## 2022-07-07 VITALS — DIASTOLIC BLOOD PRESSURE: 80 MMHG | HEIGHT: 63 IN | SYSTOLIC BLOOD PRESSURE: 120 MMHG | BODY MASS INDEX: 38.55 KG/M2

## 2022-07-07 DIAGNOSIS — Z30.430 ENCOUNTER FOR INSERTION OF MIRENA IUD: Primary | ICD-10-CM

## 2022-07-07 PROCEDURE — 58300 INSERT INTRAUTERINE DEVICE: CPT | Performed by: NURSE PRACTITIONER

## 2022-07-07 NOTE — PROGRESS NOTES
Ruth Tilley 60-year-old female here for Mirena IUD insertion for better cycle control  She has been on OCP since February for cycle control and it has been effective  She has been bleeding continuously on the pill  No LMP recorded  (Menstrual status: Birth Control)  Iud insertions    Date/Time: 7/7/2022 8:20 AM  Performed by: ANNEMARIE Das  Authorized by: Kimberly Brooks MD   Universal Protocol:  Consent: Verbal consent obtained  Risks and benefits: risks, benefits and alternatives were discussed  Consent given by: patient  Time out: Immediately prior to procedure a "time out" was called to verify the correct patient, procedure, equipment, support staff and site/side marked as required  Timeout called at: 7/7/2022 8:21 AM   Patient understanding: patient states understanding of the procedure being performed  Patient identity confirmed: verbally with patient        Procedure:     Pelvic exam performed: yes      Negative GC/chlamydia test: low risk  Negative urine pregnancy test: tubal ligation  Cervix cleaned and prepped: yes      Speculum placed in vagina: yes      Tenaculum applied to cervix: yes      Uterus sounded: yes      Uterus sound depth (cm):  7 5    IUD inserted with no complications: yes      IUD type:  Mirena    Strings trimmed: yes    Post-procedure:     Patient tolerated procedure well: yes      Patient will follow up after next period: yes    Comments:      IUD inserted without difficulty  A bedside US was performed and shows no evidence of perforation  Patient given instruction booklet  She is to return in 5 weeks for follow up  Harika Goff was seen today for procedure      Diagnoses and all orders for this visit:    Encounter for insertion of mirena IUD  -     levonorgestrel (MIRENA) IUD 20 mcg/day  -     Iud insertions

## 2022-07-12 DIAGNOSIS — N93.8 DUB (DYSFUNCTIONAL UTERINE BLEEDING): Primary | ICD-10-CM

## 2022-07-12 RX ORDER — IBUPROFEN 600 MG/1
600 TABLET ORAL EVERY 8 HOURS PRN
Qty: 15 TABLET | Refills: 0 | Status: SHIPPED | OUTPATIENT
Start: 2022-07-12 | End: 2022-08-09 | Stop reason: SDUPTHER

## 2022-07-12 NOTE — TELEPHONE ENCOUNTER
Pt had IUD insertion 7/8/2022 - still having DUB - recom Motrin 600 mg q 8 hrs x 5 days/R ANNEMARIE Solano    Please sign off on presc for same to [facility]

## 2022-07-15 ENCOUNTER — TELEPHONE (OUTPATIENT)
Dept: OBGYN CLINIC | Facility: CLINIC | Age: 32
End: 2022-07-15

## 2022-07-15 DIAGNOSIS — N92.1 BREAKTHROUGH BLEEDING: Primary | ICD-10-CM

## 2022-07-15 DIAGNOSIS — N93.8 DUB (DYSFUNCTIONAL UTERINE BLEEDING): Primary | ICD-10-CM

## 2022-07-15 RX ORDER — NORETHINDRONE ACETATE AND ETHINYL ESTRADIOL 1MG-20(21)
1 KIT ORAL DAILY
Qty: 1 TABLET | Refills: 0 | Status: SHIPPED | OUTPATIENT
Start: 2022-07-15 | End: 2022-07-15 | Stop reason: CLARIF

## 2022-07-15 RX ORDER — NORETHINDRONE ACETATE AND ETHINYL ESTRADIOL 1.5-30(21)
1 KIT ORAL DAILY
Qty: 28 TABLET | Refills: 0 | Status: SHIPPED | OUTPATIENT
Start: 2022-07-15 | End: 2022-08-15 | Stop reason: SDUPTHER

## 2022-07-15 NOTE — TELEPHONE ENCOUNTER
Patient is complaining that she is still experiencing bleeding  A mirena IUD was inserted on 7/7/22 due to breakthrough bleeding on the OCP  She has tried Ibuprofen with no resolution of bleeding  She is aware that it does take some time for her body to regulate itself with the IUD  She would like to continue on a short course of her OCP with her IUD to see if it helps control the bleeding  I am okay with her continuing the OCP for one month  She is due for follow-up in 4 weeks

## 2022-08-09 ENCOUNTER — OFFICE VISIT (OUTPATIENT)
Dept: OBGYN CLINIC | Facility: CLINIC | Age: 32
End: 2022-08-09
Payer: COMMERCIAL

## 2022-08-09 VITALS
SYSTOLIC BLOOD PRESSURE: 116 MMHG | DIASTOLIC BLOOD PRESSURE: 76 MMHG | BODY MASS INDEX: 39.34 KG/M2 | WEIGHT: 222 LBS | HEIGHT: 63 IN

## 2022-08-09 DIAGNOSIS — N94.6 DYSMENORRHEA: ICD-10-CM

## 2022-08-09 DIAGNOSIS — N92.0 MENORRHAGIA WITH REGULAR CYCLE: Primary | ICD-10-CM

## 2022-08-09 PROCEDURE — 99242 OFF/OP CONSLTJ NEW/EST SF 20: CPT | Performed by: OBSTETRICS & GYNECOLOGY

## 2022-08-09 RX ORDER — IBUPROFEN 800 MG/1
800 TABLET ORAL EVERY 6 HOURS PRN
Qty: 30 TABLET | Refills: 1 | Status: SHIPPED | OUTPATIENT
Start: 2022-08-09

## 2022-08-09 NOTE — PROGRESS NOTES
Assessment/Plan:  Patient is interested in endometrial ablation, more conservative approach  Discussed treatment options for menorrhagia and success of ablation  Reviewed menstrual diary, was very regular up until starting OCPs/progesterone IUD  She prefers to continue regiment as this has decreased her menstrual flow until ablation scheduled  Discussed if decreasing menstrual flow would then improved dysmenorrhea  Discussed the risks and benefits of NovaSure ablation including failure rate less than 3%  She is aware that given her stated age this failure rate may be higher  She would like to proceed with scheduling surgical date, including D&C NovaSure ablation/hysteroscopy  No problem-specific Assessment & Plan notes found for this encounter  There are no diagnoses linked to this encounter  Subjective:      Patient ID: Peter Vargas is a 32 y o  female  HPI     This is a 70-year-old female  ( x3, age 13, 5, 9) presents for consultation (self referral) for endometrial ablation  Her menstrual cycles were regular approximately every 4 weeks lasting 4-5 days described as very heavy passing large clots on day 1 of her cycle with significant menstrual cramping/back pain/radiating down legs  She denied any breakthrough bleeding  She then started OCPs (Loestrin 1/20) on 2022, more for cramping  She then changed from cyclic to continuous OCP 2022 resulting in breakthrough bleeding  She then changed to Loestrin 1 /30  With no improvement she then had the Mirena IUD inserted 2022  She continues to bleed on a daily basis with spotting and intermittent heavy bleeding with menstrual cramping  At this point she has Loestrin 1 5/30 and Mirena treatment  She is sexually active has been in a monogamous relationship  Method of contraception has been tubal ligation      She does follow up with Neurology, history of Charcot-Rosy-Tooth Disease, type 1 a, 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15, decreased sensation below knees bilaterally, weak ankles, status post right ankle reconstruction  Pregnancies has been complicated by postpartum hemorrhage    The following portions of the patient's history were reviewed and updated as appropriate: allergies, current medications, past family history, past medical history, past social history, past surgical history and problem list     Review of Systems   Constitutional: Negative for fatigue, fever and unexpected weight change  Respiratory: Negative for cough, chest tightness, shortness of breath and wheezing  Cardiovascular: Negative  Negative for chest pain and palpitations  Gastrointestinal: Negative  Negative for abdominal distention, abdominal pain, blood in stool, constipation, diarrhea, nausea and vomiting  Genitourinary: Positive for menstrual problem  Negative for difficulty urinating, dyspareunia, dysuria, flank pain, frequency, genital sores, hematuria, pelvic pain, urgency, vaginal bleeding, vaginal discharge and vaginal pain  Skin: Negative for rash  Objective:      /76   Ht 5' 3" (1 6 m)   Wt 101 kg (222 lb)   LMP 08/08/2022   BMI 39 33 kg/m²          Physical Exam  Constitutional:       Appearance: Normal appearance  Abdominal:      General: Bowel sounds are normal  There is no distension  Palpations: Abdomen is soft  Tenderness: There is no abdominal tenderness  There is no guarding or rebound  Comments: Laparoscopic port scarx 2 noted umbilicus, suprapubic   Genitourinary:     Labia:         Right: No rash, tenderness or lesion  Left: No rash, tenderness or lesion  Vagina: No signs of injury  No vaginal discharge or tenderness  Cervix: No cervical motion tenderness, discharge, friability, lesion, erythema or cervical bleeding  Uterus: Not enlarged and not tender  Adnexa:         Right: No mass, tenderness or fullness  Left: No mass, tenderness or fullness  Neurological:      Mental Status: She is alert and oriented to person, place, and time     Psychiatric:         Behavior: Behavior normal

## 2022-08-10 ENCOUNTER — PREP FOR PROCEDURE (OUTPATIENT)
Dept: OBGYN CLINIC | Facility: CLINIC | Age: 32
End: 2022-08-10

## 2022-08-10 DIAGNOSIS — Z01.818 PREOP TESTING: ICD-10-CM

## 2022-08-10 DIAGNOSIS — N92.0 EXCESSIVE OR FREQUENT MENSTRUATION: Primary | ICD-10-CM

## 2022-08-15 DIAGNOSIS — N93.8 DUB (DYSFUNCTIONAL UTERINE BLEEDING): ICD-10-CM

## 2022-08-15 RX ORDER — NORETHINDRONE ACETATE AND ETHINYL ESTRADIOL 1.5-30(21)
1 KIT ORAL DAILY
Qty: 84 TABLET | Refills: 0 | Status: SHIPPED | OUTPATIENT
Start: 2022-08-15 | End: 2022-10-27

## 2022-08-24 ENCOUNTER — APPOINTMENT (OUTPATIENT)
Dept: LAB | Age: 32
End: 2022-08-24

## 2022-08-24 DIAGNOSIS — Z00.8 HEALTH EXAMINATION IN POPULATION SURVEY: ICD-10-CM

## 2022-08-24 LAB
CHOLEST SERPL-MCNC: 209 MG/DL
HDLC SERPL-MCNC: 35 MG/DL
LDLC SERPL CALC-MCNC: 124 MG/DL (ref 0–100)
NONHDLC SERPL-MCNC: 174 MG/DL
TRIGL SERPL-MCNC: 249 MG/DL

## 2022-08-24 PROCEDURE — 80061 LIPID PANEL: CPT

## 2022-08-24 PROCEDURE — 83036 HEMOGLOBIN GLYCOSYLATED A1C: CPT

## 2022-08-24 PROCEDURE — 36415 COLL VENOUS BLD VENIPUNCTURE: CPT

## 2022-08-25 ENCOUNTER — OFFICE VISIT (OUTPATIENT)
Dept: FAMILY MEDICINE CLINIC | Facility: CLINIC | Age: 32
End: 2022-08-25
Payer: COMMERCIAL

## 2022-08-25 VITALS
RESPIRATION RATE: 18 BRPM | WEIGHT: 220 LBS | SYSTOLIC BLOOD PRESSURE: 112 MMHG | BODY MASS INDEX: 38.98 KG/M2 | HEART RATE: 68 BPM | OXYGEN SATURATION: 96 % | TEMPERATURE: 98.5 F | DIASTOLIC BLOOD PRESSURE: 76 MMHG | HEIGHT: 63 IN

## 2022-08-25 DIAGNOSIS — G43.101 MIGRAINE WITH AURA AND WITH STATUS MIGRAINOSUS, NOT INTRACTABLE: Primary | ICD-10-CM

## 2022-08-25 DIAGNOSIS — F32.1 CURRENT MODERATE EPISODE OF MAJOR DEPRESSIVE DISORDER, UNSPECIFIED WHETHER RECURRENT (HCC): ICD-10-CM

## 2022-08-25 DIAGNOSIS — F41.9 ANXIETY: ICD-10-CM

## 2022-08-25 LAB
EST. AVERAGE GLUCOSE BLD GHB EST-MCNC: 94 MG/DL
HBA1C MFR BLD: 4.9 %

## 2022-08-25 PROCEDURE — 99214 OFFICE O/P EST MOD 30 MIN: CPT | Performed by: NURSE PRACTITIONER

## 2022-08-25 RX ORDER — METHYLPREDNISOLONE 4 MG/1
TABLET ORAL
Qty: 21 EACH | Refills: 0 | Status: SHIPPED | OUTPATIENT
Start: 2022-08-25 | End: 2022-09-09 | Stop reason: ALTCHOICE

## 2022-08-25 RX ORDER — HYDROXYZINE HYDROCHLORIDE 25 MG/1
25 TABLET, FILM COATED ORAL EVERY 6 HOURS PRN
Qty: 90 TABLET | Refills: 1 | Status: SHIPPED | OUTPATIENT
Start: 2022-08-25 | End: 2022-09-09 | Stop reason: SDUPTHER

## 2022-08-25 NOTE — PROGRESS NOTES
Assessment/Plan:  Migraine  This is an ongoing chronic complaint with patient  She has been given a referral for Neurology states she already has Neurology as a provider but not for migraines specific did advised that I can give her oral steroids for this as she has taken copious amounts of Motrin only for the past week patient states that she feels this could be due to her anxiety not sure as I am not a neurologist did advise I will refer her to Neurology asap for assessment and treatment  In the meantime oral steroids were given she is advised to take those as prescribed  It should be noted that patient also is currently using to primary forms of birth control of for severe menstrual complaints  Depression with anxiety  This is an ongoing complaint of patient's she currently takes venlafaxine 35703 hour release she is also taking Atarax will refill the Atarax for her today however I do feel that due to her lack of response to this medication and possible need for actual counseling will refer her to a psychiatrist will get the appointment asap contact her with the time the date for this appointment  It should be noted she denies any thoughts of harming herself or harming anyone else  She is stating that her manager at work is causing most of her anxiety I told her I could not discuss this with her she should really approach her manager concerning this complaint  Dosing all possible side effects of the prescribed medications or medications that had been prescribed in the past were reviewed and all questions were answered  Patient verbalized agreement and understanding of the plan of care as outlined during the office visit today return to office as indicated or sooner if a problem arises           Problem List Items Addressed This Visit        Cardiovascular and Mediastinum    Migraine - Primary    Relevant Medications    methylPREDNISolone 4 MG tablet therapy pack    Other Relevant Orders    Ambulatory Referral to Neurology      Other Visit Diagnoses     Anxiety        Relevant Medications    hydrOXYzine HCL (ATARAX) 25 mg tablet    Other Relevant Orders    Ambulatory Referral to Psychiatry    Current moderate episode of major depressive disorder, unspecified whether recurrent (HCC)        Relevant Medications    hydrOXYzine HCL (ATARAX) 25 mg tablet    Other Relevant Orders    Ambulatory Referral to Psychiatry            Subjective:      Patient ID: Radha Carson is a 32 y o  female  HPI    The following portions of the patient's history were reviewed and updated as appropriate: allergies, current medications, past family history, past medical history, past social history, past surgical history and problem list     Review of Systems   Constitutional: Negative for appetite change and fever  HENT: Negative for sinus pressure and sore throat  Eyes: Negative for pain  Respiratory: Negative for shortness of breath  Cardiovascular: Negative for chest pain  Gastrointestinal: Negative for abdominal pain  Genitourinary: Negative for dysuria  Musculoskeletal: Negative for arthralgias and myalgias  Skin: Negative for color change  Neurological: Positive for headaches  Negative for light-headedness  Psychiatric/Behavioral: Negative for behavioral problems  The patient is nervous/anxious  Objective:      /76 (BP Location: Left arm, Patient Position: Sitting, Cuff Size: Large)   Pulse 68   Temp 98 5 °F (36 9 °C) (Temporal)   Resp 18   Ht 5' 3" (1 6 m)   Wt 99 8 kg (220 lb)   LMP 08/08/2022   SpO2 96%   BMI 38 97 kg/m²          Physical Exam  Vitals and nursing note reviewed  Constitutional:       General: She is not in acute distress  Appearance: She is well-developed  She is not diaphoretic  HENT:      Head: Normocephalic and atraumatic        Right Ear: Tympanic membrane and external ear normal       Left Ear: Tympanic membrane and external ear normal    Eyes: Pupils: Pupils are equal, round, and reactive to light  Cardiovascular:      Rate and Rhythm: Normal rate and regular rhythm  Heart sounds: Normal heart sounds  Pulmonary:      Effort: Pulmonary effort is normal       Breath sounds: Normal breath sounds  Skin:     General: Skin is dry  Neurological:      General: No focal deficit present  Mental Status: She is alert and oriented to person, place, and time  Psychiatric:         Behavior: Behavior normal          Thought Content:  Thought content normal

## 2022-08-26 ENCOUNTER — TELEPHONE (OUTPATIENT)
Dept: PSYCHIATRY | Facility: CLINIC | Age: 32
End: 2022-08-26

## 2022-08-26 NOTE — TELEPHONE ENCOUNTER
contacted patient in regards to ASAP referral in attempts to schedule pt at [place] office   lvm for patient to contact intake dept
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
## 2022-08-29 NOTE — TELEPHONE ENCOUNTER
contacted patient to schedule for 2 week f/u appt spoke w  patient was able to schedule 2 wk f/u w   Dr Raoul Estrada on 9/27 @ 6p

## 2022-08-29 NOTE — TELEPHONE ENCOUNTER
Patient does not have a 4 wk f/u appointment schedule for medication mgmt appointment   Dr Linda Godinez  Due to providers schedule and being out of office for 2 weeks straight  msg sent to Rivas Arnold will contact pt with updates in regards of f/u  if needed

## 2022-09-09 ENCOUNTER — OFFICE VISIT (OUTPATIENT)
Dept: PSYCHIATRY | Facility: CLINIC | Age: 32
End: 2022-09-09
Payer: COMMERCIAL

## 2022-09-09 VITALS — WEIGHT: 220 LBS | BODY MASS INDEX: 38.97 KG/M2

## 2022-09-09 DIAGNOSIS — F32.1 CURRENT MODERATE EPISODE OF MAJOR DEPRESSIVE DISORDER, UNSPECIFIED WHETHER RECURRENT (HCC): ICD-10-CM

## 2022-09-09 DIAGNOSIS — F43.10 PTSD (POST-TRAUMATIC STRESS DISORDER): Primary | ICD-10-CM

## 2022-09-09 DIAGNOSIS — F41.9 ANXIETY: ICD-10-CM

## 2022-09-09 PROCEDURE — 90792 PSYCH DIAG EVAL W/MED SRVCS: CPT | Performed by: STUDENT IN AN ORGANIZED HEALTH CARE EDUCATION/TRAINING PROGRAM

## 2022-09-09 RX ORDER — HYDROXYZINE HYDROCHLORIDE 25 MG/1
25 TABLET, FILM COATED ORAL EVERY 6 HOURS PRN
Qty: 90 TABLET | Refills: 0 | Status: SHIPPED | OUTPATIENT
Start: 2022-09-09

## 2022-09-09 RX ORDER — PRAZOSIN HYDROCHLORIDE 1 MG/1
1 CAPSULE ORAL
Qty: 30 CAPSULE | Refills: 0 | Status: SHIPPED | OUTPATIENT
Start: 2022-09-09 | End: 2022-09-27 | Stop reason: ALTCHOICE

## 2022-09-09 RX ORDER — DULOXETIN HYDROCHLORIDE 30 MG/1
30 CAPSULE, DELAYED RELEASE ORAL
Qty: 30 CAPSULE | Refills: 0 | Status: SHIPPED | OUTPATIENT
Start: 2022-09-09 | End: 2022-09-27 | Stop reason: ALTCHOICE

## 2022-09-09 NOTE — LETTER
September 9, 2022     Patient: Meño Ignacio  YOB: 1990  Date of Visit: 9/9/2022      To Whom it May Concern:    Meño Ignacio is under my professional care  Tamra Nunes was seen in my office on 9/9/2022  Tamra Nunes may return to work on 9/9/2022  If you have any questions or concerns, please don't hesitate to call           Sincerely,          Maria Guadalupe Pike MD        CC: No Recipients

## 2022-09-09 NOTE — LETTER
September 9, 2022     Patient: Eber Rodriguez  YOB: 1990  Date of Visit: 9/9/2022      To Whom it May Concern:    Eber Rodriguez is under my professional care  Dinorahivy Montgomery was seen in my office on 9/9/2022  Dinorah Montgomery may return to work on 9/9/2020  If you have any questions or concerns, please don't hesitate to call           Sincerely,          Jannette Wilkes MD        CC: No Recipients

## 2022-09-09 NOTE — BH TREATMENT PLAN
TREATMENT PLAN (Medication Management Only)        [facility]    Name and Date of Birth:  Bijan Whelan 32 y o  1990  Date of Treatment Plan: September 9, 2022  Diagnosis/Diagnoses:    1  PTSD (post-traumatic stress disorder)    2  Anxiety    3  Current moderate episode of major depressive disorder, unspecified whether recurrent ([facility] )      Strengths/Personal Resources for Self-Care: supportive family, taking medications as prescribed, ability to adapt to life changes, ability to communicate needs, ability to communicate well, ability to listen, ability to reason, ability to understand psychiatric illness, average or above intelligence, family ties, good understanding of illness, independence, motivation for treatment, ability to negotiate basic needs, sense of humor, special hobby/interest, stable employment, strong zack, well educated, willingness to work on problems, work skills  Area/Areas of need (in own words): anxiety symptoms, depressive symptoms, sleep problems, financial problems  1  Long Term Goal: decrease anxiety  Target Date:6 months - 3/9/2023  Person/Persons responsible for completion of goal: Lindsey  2  Short Term Objective (s) - How will we reach this goal?:   A  Provider new recommended medication/dosage changes and/or continue medication(s): continue current medications as prescribed Cymbalta, Prazosin   B  Attend psychotherapy regularly  C  Take psychiatric medications responsibly  Target Date:6 months - 3/9/2023  Person/Persons Responsible for Completion of Goal: Lindsey  Progress Towards Goals: starting treatment  Treatment Modality: medication management every 3 week  Review due 180 days from date of this plan: 6 months - 3/9/2023  Expected length of service: over 1 year  My Physician and I have developed this plan together and I agree to work on the goals and objectives   I understand the treatment goals that were developed for my treatment

## 2022-09-09 NOTE — LETTER
September 9, 2022     Patient: Kj Reyes  YOB: 1990  Date of Visit: 9/9/2022      To Whom it May Concern:    Kj Reyse is under my professional care  Severiano Gouty was seen in my office on 9/9/2022  Severiano Gouty may return to work on 9/9/2022 after 12:00 pm      If you have any questions or concerns, please don't hesitate to call           Sincerely,          Manny Groves MD        CC: No Recipients

## 2022-09-13 ENCOUNTER — SOCIAL WORK (OUTPATIENT)
Dept: BEHAVIORAL/MENTAL HEALTH CLINIC | Facility: CLINIC | Age: 32
End: 2022-09-13
Payer: COMMERCIAL

## 2022-09-13 DIAGNOSIS — F41.9 ANXIETY AND DEPRESSION: Primary | ICD-10-CM

## 2022-09-13 DIAGNOSIS — F32.A ANXIETY AND DEPRESSION: Primary | ICD-10-CM

## 2022-09-13 PROCEDURE — 90791 PSYCH DIAGNOSTIC EVALUATION: CPT | Performed by: COUNSELOR
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
## 2022-09-13 NOTE — BH TREATMENT PLAN
Yahairaivy Demario  1990       Date of Initial Treatment Plan: 9/13/22   Date of Current Treatment Plan: 09/13/22    Treatment Plan Number 1     Strengths/Personal Resources for Self Care: Pt has very good knowledge and awareness about her mental state and has a very supportive significant other  Diagnosis:   1  Anxiety and depression         Area of Needs: Mood and processing complex trauma       Long Term Goal 1: A decrease anxiety to improve daily functioning - I want to be me again, bubbly, like I was before; rate with GAD7 monthly for evidence of symptom change     Target Date: MAR 14, 2023  Completion Date: TBD         Short Term Objectives for Goal 1: Alearn DBT and CBT coping skills to counter anxiety and practice at least one relaxation technique on a daily basis     Long Term Goal 2: decrease depression to improve daily functioning - I want to be my babbly, happier self again  rate with PHQ9 monthly for evidence of symptom change     Target Date: MAR 14, 2023  Completion Date: TBD    Short Term Objectives for Goal 2: Alearn DBT and CBT skills to counter depression and Act Opposite at least once on a daily          Long Term Goal # 3: N/A     Target Date: N/A  Completion Date: N/A    Short Term Objectives for Goal 3: N/A    GOAL 1: Modality: Individual 4x per month   Completion Date TBD    GOAL 2: Modality: Individual 4x per month   Completion Date TBD     GOAL 3: Modality: Individual NAx per month   Completion Date Na      [address]: Diagnosis and Treatment Plan explained to Juan Asp relates understanding diagnosis and is agreeable to Treatment Plan  Client Comments : Please share your thoughts, feelings, need and/or experiences regarding your treatment plan: Michel is very determined and supported to follow through her goals and take care of herself       Jason Rodriguez, 1990, actively participated in the creation of this treatment plan during an in-person session but used this way to agree with this TX due to not working equipment    Annie Aquino  provided verbal consent on 9/13/2022 at 3:43 PM

## 2022-09-19 PROCEDURE — NC001 PR NO CHARGE: Performed by: OBSTETRICS & GYNECOLOGY

## 2022-09-19 NOTE — H&P
H&P Exam - Gynecology   Ermelinda Bashir 32 y o  female MRN: [id number]  Unit/Bed#:  Encounter: [id number]    Assessment/Plan     Assessment:  1  Menorrhagia  Plan:  Patient was consented for D and C, endometrial ablation, hysteroscopy  Risks and benefits reviewed including failure rate less than 5%  She is aware that given her stated age, failure rate might be higher  She is up-to-date with all lab work  She will schedule postop visit 3 weeks  All questions answered  History of Present Illness     HPI:  Ermelinda Bashir is a 32 y o  female  ( x3, age 13, 5, 9) presents for consultation (self referral) for endometrial ablation  Her menstrual cycles were regular approximately every 4 weeks lasting 4-5 days described as very heavy passing large clots on day 1 of her cycle with significant menstrual cramping/back pain/radiating down legs  She denied any breakthrough bleeding  She then started OCPs (Loestrin 20) on 2022, more for cramping  She then changed from cyclic to continuous OCP 2022 resulting in breakthrough bleeding  She then changed to Loestrin 1   With no improvement she then had the Mirena IUD inserted 2022  She continues to bleed on a daily basis with spotting and intermittent heavy bleeding with menstrual cramping  At this point she has Loestrin 1 /30 and Mirena treatment  She is sexually active has been in a monogamous relationship  Method of contraception has been tubal ligation      She does follow up with Neurology, history of Charcot-Rosy-Tooth Disease, type 1 a, 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15, decreased sensation below knees bilaterally, weak ankles, status post right ankle reconstruction      Pregnancies has been complicated by postpartum hemorrhage   Review of Systems   Constitutional: Negative for fatigue, fever and unexpected weight change  Respiratory: Negative for cough, chest tightness, shortness of breath and wheezing  Cardiovascular: Negative    Negative for chest pain and palpitations  Gastrointestinal: Negative  Negative for abdominal distention, abdominal pain, blood in stool, constipation, diarrhea, nausea and vomiting  Genitourinary: Positive for menstrual problem  Negative for difficulty urinating, dyspareunia, dysuria, flank pain, frequency, genital sores, hematuria, pelvic pain, urgency, vaginal bleeding, vaginal discharge and vaginal pain  Skin: Negative for rash  Historical Information   Past Medical History:   Diagnosis Date    Anxiety     CMTD (Charcot-Rosy-Tooth disease)     Depression     Migraines     Mitral valve prolapse      Past Surgical History:   Procedure Laterality Date    ANKLE SURGERY      DILATION AND CURETTAGE OF UTERUS      TUBAL LIGATION      WISDOM TOOTH EXTRACTION       OB/GYN History:  As above  Family History   Problem Relation Age of Onset    Heart disease Mother     Other Mother         mutant gene "heart"    Mitral valve prolapse Mother     No Known Problems Father     Heart disease Maternal Grandmother      Social History   Social History     Substance and Sexual Activity   Alcohol Use Yes    Comment: social     Social History     Substance and Sexual Activity   Drug Use Yes    Types: Marijuana    Comment: medical card     Social History     Tobacco Use   Smoking Status Never Smoker   Smokeless Tobacco Never Used     E-Cigarette/Vaping    E-Cigarette Use Never User      E-Cigarette/Vaping Substances    Nicotine No     THC No     CBD No     Flavoring No     Other No     Unknown No        Meds/Allergies   all current active meds have been reviewed  Allergies   Allergen Reactions    Sumatriptan Other (See Comments)     Weakness, arms felt heavy    Codeine GI Intolerance and Other (See Comments)     Migraines       Objective   Vitals: unknown if currently breastfeeding  No intake or output data in the 24 hours ending 09/19/22 0732    Invasive Devices:    Invasive Devices  Report    None Physical Exam  Constitutional:       Appearance: Normal appearance  Cardiovascular:      Rate and Rhythm: Normal rate and regular rhythm  Pulmonary:      Effort: Pulmonary effort is normal       Breath sounds: Normal breath sounds  Abdominal:      General: Bowel sounds are normal  There is no distension  Palpations: Abdomen is soft  Tenderness: There is no abdominal tenderness  There is no guarding or rebound  Genitourinary:     Labia:         Right: No rash, tenderness or lesion  Left: No rash, tenderness or lesion  Vagina: No signs of injury  No vaginal discharge or tenderness  Cervix: No cervical motion tenderness, discharge, friability, lesion, erythema or cervical bleeding  Uterus: Not enlarged and not tender  Adnexa:         Right: No mass, tenderness or fullness  Left: No mass, tenderness or fullness  Neurological:      Mental Status: She is alert and oriented to person, place, and time  Psychiatric:         Behavior: Behavior normal        Extremities:  Present x4  Lab Results: I have personally reviewed pertinent reports  Imaging: I have personally reviewed pertinent reports  EKG, Pathology, and Other Studies: I have personally reviewed pertinent reports  Code Status: No Order  Advance Directive and Living Will:      Power of :    POLST:      Counseling / Coordination of Care  Total floor / unit time spent today 30 minutes  Greater than 50% of total time was spent with the patient and / or family counseling and / or coordination of care  A description of the counseling / coordination of care

## 2022-09-20 ENCOUNTER — ANESTHESIA EVENT (OUTPATIENT)
Dept: PERIOP | Facility: HOSPITAL | Age: 32
End: 2022-09-20
Payer: COMMERCIAL

## 2022-09-20 NOTE — PRE-PROCEDURE INSTRUCTIONS
Pre-Surgery Instructions:   Medication Instructions    DULoxetine (CYMBALTA) 30 mg delayed release capsule Take night before surgery    hydrOXYzine HCL (ATARAX) 25 mg tablet Uses PRN- OK to take day of surgery    ibuprofen (MOTRIN) 800 mg tablet Stop taking 3 days prior to surgery   Levonorgestrel (MIRENA) 20 MCG/DAY IUD Take day of surgery  IUD implant    norethindrone-ethinyl estradiol-iron (Loestrin Fe 1 5/30) 1 5-30 MG-MCG tablet Instructions provided by MDpmarcio pt ok to take night before surgery    prazosin (MINIPRESS) 1 mg capsule Take night before surgery    Patient / instructed on use of chlorhexidine soap per hospital protocol    Patient instructed to stop all ASA, NSAIDS, vitamins and herbal supplements from now to surgery or per Dr Rachele Trotter   Tylenol is ok to take if needed

## 2022-09-21 NOTE — ANESTHESIA PREPROCEDURE EVALUATION
Procedure:  ABLATION ENDOMETRIAL NOVASURE (N/A Uterus)  DILATATION AND CURETTAGE (D&C) WITH HYSTEROSCOPY (N/A Uterus)  REMOVAL OF INTRAUTERINE DEVICE (IUD) (N/A Uterus)    Relevant Problems   ANESTHESIA   (+) PONV (postoperative nausea and vomiting)      CARDIO   (+) Hyperlipidemia   (+) Migraine   (+) Mitral valve prolapse      ENDO (within normal limits)      GI/HEPATIC (within normal limits)      /RENAL (within normal limits)      GYN   (-) Currently pregnant      HEMATOLOGY (within normal limits)      MUSCULOSKELETAL   (+) Acute bilateral low back pain without sciatica      NEURO/PSYCH   (+) Anxiety and depression   (+) Migraine      PULMONARY   (+) Asthma      Nervous and Auditory   (+) Charcot-Rosy-Tooth disease      Other   (+) Obesity (BMI 30-39  9)        Physical Exam    Airway    Mallampati score: II  TM Distance: >3 FB  Neck ROM: full     Dental   No notable dental hx     Cardiovascular  Rhythm: regular, Rate: normal, Cardiovascular exam normal    Pulmonary  Pulmonary exam normal Breath sounds clear to auscultation,     Other Findings        Anesthesia Plan  ASA Score- 2     Anesthesia Type- general with ASA Monitors  Additional Monitors:   Airway Plan: LMA  Plan Factors-    Chart reviewed  EKG reviewed  Imaging results reviewed  Existing labs reviewed  Patient summary reviewed  Patient is not a current smoker  Patient did not smoke on day of surgery  Induction- intravenous  Postoperative Plan-   Planned trial extubation    Informed Consent- Anesthetic plan and risks discussed with patient  I personally reviewed this patient with the CRNA  Discussed and agreed on the Anesthesia Plan with the CRNA             NPO and allergies verified  Patient received PO Tylenol preoperatively  Urine pregnancy test negative today, 9/22/22  Urine pregnancy test negative today, 9/22/22    Patient states she thinks she had n/v and migraines with fentanyl administration in the past   She states she gets migraines frequently approximately 2-3 times a week  Upon review of her intraop anesthesia record from her tubal ligation performed at [address] [place] [address] [address] on 11/13/15, patient is documented to have received 250 mcg of fentanyl during that procedure  Patient states she had no reaction or issues with that procedure  We discussed options such as avoiding all opioids during surgery and giving dose of IV Toradol with consideration for small doses of IV morphine in PACU if needed for pain  Patient states she is find to try some fentanyl intraop if needed  Plan:  GA, LMA    Risks and benefits of general anesthesia were discussed with the patient  Discussed risks of anesthesia including, but not limited to, the risk of dental injury, n/v, sore throat, corneal abrasions, and arrhythmias  All questions were answered  Anesthesia consent was obtained from the patient

## 2022-09-22 ENCOUNTER — ANESTHESIA (OUTPATIENT)
Dept: PERIOP | Facility: HOSPITAL | Age: 32
End: 2022-09-22
Payer: COMMERCIAL

## 2022-09-22 ENCOUNTER — HOSPITAL ENCOUNTER (OUTPATIENT)
Facility: HOSPITAL | Age: 32
Setting detail: OUTPATIENT SURGERY
Discharge: HOME | End: 2022-09-22
Attending: OBSTETRICS & GYNECOLOGY | Admitting: OBSTETRICS & GYNECOLOGY
Payer: COMMERCIAL

## 2022-09-22 VITALS
SYSTOLIC BLOOD PRESSURE: 121 MMHG | OXYGEN SATURATION: 96 % | HEART RATE: 76 BPM | HEIGHT: 63 IN | WEIGHT: 220 LBS | BODY MASS INDEX: 38.98 KG/M2 | RESPIRATION RATE: 18 BRPM | TEMPERATURE: 98 F | DIASTOLIC BLOOD PRESSURE: 81 MMHG

## 2022-09-22 DIAGNOSIS — N92.0 EXCESSIVE OR FREQUENT MENSTRUATION: ICD-10-CM

## 2022-09-22 DIAGNOSIS — G89.18 POST-OP PAIN: Primary | ICD-10-CM

## 2022-09-22 LAB
EXT PREGNANCY TEST URINE: NEGATIVE
EXT. CONTROL: NORMAL

## 2022-09-22 PROCEDURE — 81025 URINE PREGNANCY TEST: CPT | Performed by: OBSTETRICS & GYNECOLOGY

## 2022-09-22 PROCEDURE — 58301 REMOVE INTRAUTERINE DEVICE: CPT | Performed by: OBSTETRICS & GYNECOLOGY

## 2022-09-22 PROCEDURE — 58563 HYSTEROSCOPY ABLATION: CPT | Performed by: OBSTETRICS & GYNECOLOGY

## 2022-09-22 PROCEDURE — 88305 TISSUE EXAM BY PATHOLOGIST: CPT | Performed by: PATHOLOGY

## 2022-09-22 RX ORDER — LIDOCAINE HYDROCHLORIDE 10 MG/ML
0.5 INJECTION, SOLUTION EPIDURAL; INFILTRATION; INTRACAUDAL; PERINEURAL ONCE AS NEEDED
Status: DISCONTINUED | OUTPATIENT
Start: 2022-09-22 | End: 2022-09-22

## 2022-09-22 RX ORDER — PROPOFOL 10 MG/ML
INJECTION, EMULSION INTRAVENOUS AS NEEDED
Status: DISCONTINUED | OUTPATIENT
Start: 2022-09-22 | End: 2022-09-22

## 2022-09-22 RX ORDER — ONDANSETRON 2 MG/ML
4 INJECTION INTRAMUSCULAR; INTRAVENOUS EVERY 6 HOURS PRN
Status: DISCONTINUED | OUTPATIENT
Start: 2022-09-22 | End: 2022-09-22 | Stop reason: HOSPADM

## 2022-09-22 RX ORDER — LIDOCAINE HYDROCHLORIDE 10 MG/ML
INJECTION, SOLUTION EPIDURAL; INFILTRATION; INTRACAUDAL; PERINEURAL AS NEEDED
Status: DISCONTINUED | OUTPATIENT
Start: 2022-09-22 | End: 2022-09-22

## 2022-09-22 RX ORDER — ONDANSETRON 2 MG/ML
4 INJECTION INTRAMUSCULAR; INTRAVENOUS ONCE AS NEEDED
Status: DISCONTINUED | OUTPATIENT
Start: 2022-09-22 | End: 2022-09-22

## 2022-09-22 RX ORDER — KETOROLAC TROMETHAMINE 30 MG/ML
INJECTION, SOLUTION INTRAMUSCULAR; INTRAVENOUS AS NEEDED
Status: DISCONTINUED | OUTPATIENT
Start: 2022-09-22 | End: 2022-09-22

## 2022-09-22 RX ORDER — FENTANYL CITRATE/PF 50 MCG/ML
25 SYRINGE (ML) INJECTION
Status: DISCONTINUED | OUTPATIENT
Start: 2022-09-22 | End: 2022-09-22 | Stop reason: HOSPADM

## 2022-09-22 RX ORDER — SODIUM CHLORIDE, SODIUM LACTATE, POTASSIUM CHLORIDE, CALCIUM CHLORIDE 600; 310; 30; 20 MG/100ML; MG/100ML; MG/100ML; MG/100ML
75 INJECTION, SOLUTION INTRAVENOUS CONTINUOUS
Status: DISCONTINUED | OUTPATIENT
Start: 2022-09-22 | End: 2022-09-22 | Stop reason: HOSPADM

## 2022-09-22 RX ORDER — SODIUM CHLORIDE, SODIUM LACTATE, POTASSIUM CHLORIDE, CALCIUM CHLORIDE 600; 310; 30; 20 MG/100ML; MG/100ML; MG/100ML; MG/100ML
INJECTION, SOLUTION INTRAVENOUS CONTINUOUS PRN
Status: DISCONTINUED | OUTPATIENT
Start: 2022-09-22 | End: 2022-09-22

## 2022-09-22 RX ORDER — SODIUM CHLORIDE, SODIUM LACTATE, POTASSIUM CHLORIDE, CALCIUM CHLORIDE 600; 310; 30; 20 MG/100ML; MG/100ML; MG/100ML; MG/100ML
100 INJECTION, SOLUTION INTRAVENOUS CONTINUOUS
Status: DISCONTINUED | OUTPATIENT
Start: 2022-09-22 | End: 2022-09-22

## 2022-09-22 RX ORDER — FENTANYL CITRATE 50 UG/ML
INJECTION, SOLUTION INTRAMUSCULAR; INTRAVENOUS AS NEEDED
Status: DISCONTINUED | OUTPATIENT
Start: 2022-09-22 | End: 2022-09-22

## 2022-09-22 RX ORDER — MIDAZOLAM HYDROCHLORIDE 2 MG/2ML
INJECTION, SOLUTION INTRAMUSCULAR; INTRAVENOUS AS NEEDED
Status: DISCONTINUED | OUTPATIENT
Start: 2022-09-22 | End: 2022-09-22

## 2022-09-22 RX ORDER — DEXAMETHASONE SODIUM PHOSPHATE 10 MG/ML
INJECTION, SOLUTION INTRAMUSCULAR; INTRAVENOUS AS NEEDED
Status: DISCONTINUED | OUTPATIENT
Start: 2022-09-22 | End: 2022-09-22

## 2022-09-22 RX ORDER — ONDANSETRON 2 MG/ML
INJECTION INTRAMUSCULAR; INTRAVENOUS AS NEEDED
Status: DISCONTINUED | OUTPATIENT
Start: 2022-09-22 | End: 2022-09-22

## 2022-09-22 RX ORDER — PROMETHAZINE HYDROCHLORIDE 25 MG/ML
12.5 INJECTION, SOLUTION INTRAMUSCULAR; INTRAVENOUS ONCE AS NEEDED
Status: DISCONTINUED | OUTPATIENT
Start: 2022-09-22 | End: 2022-09-22

## 2022-09-22 RX ORDER — OXYCODONE HYDROCHLORIDE 5 MG/1
5 TABLET ORAL EVERY 6 HOURS PRN
Qty: 6 TABLET | Refills: 0 | Status: SHIPPED | OUTPATIENT
Start: 2022-09-22 | End: 2022-10-27

## 2022-09-22 RX ORDER — ACETAMINOPHEN 325 MG/1
975 TABLET ORAL ONCE
Status: COMPLETED | OUTPATIENT
Start: 2022-09-22 | End: 2022-09-22

## 2022-09-22 RX ORDER — ACETAMINOPHEN 325 MG/1
975 TABLET ORAL EVERY 6 HOURS PRN
Status: DISCONTINUED | OUTPATIENT
Start: 2022-09-22 | End: 2022-09-22 | Stop reason: HOSPADM

## 2022-09-22 RX ORDER — GLYCOPYRROLATE 0.2 MG/ML
INJECTION INTRAMUSCULAR; INTRAVENOUS AS NEEDED
Status: DISCONTINUED | OUTPATIENT
Start: 2022-09-22 | End: 2022-09-22

## 2022-09-22 RX ORDER — IBUPROFEN 600 MG/1
600 TABLET ORAL EVERY 6 HOURS PRN
Status: DISCONTINUED | OUTPATIENT
Start: 2022-09-22 | End: 2022-09-22 | Stop reason: HOSPADM

## 2022-09-22 RX ORDER — EPHEDRINE SULFATE 50 MG/ML
INJECTION INTRAVENOUS AS NEEDED
Status: DISCONTINUED | OUTPATIENT
Start: 2022-09-22 | End: 2022-09-22

## 2022-09-22 RX ADMIN — EPHEDRINE SULFATE 5 MG: 50 INJECTION INTRAVENOUS at 09:44

## 2022-09-22 RX ADMIN — ACETAMINOPHEN 975 MG: 325 TABLET, FILM COATED ORAL at 08:18

## 2022-09-22 RX ADMIN — PROPOFOL 50 MG: 10 INJECTION, EMULSION INTRAVENOUS at 09:26

## 2022-09-22 RX ADMIN — PROPOFOL 50 MG: 10 INJECTION, EMULSION INTRAVENOUS at 09:29

## 2022-09-22 RX ADMIN — Medication 25 MCG: at 10:31

## 2022-09-22 RX ADMIN — ONDANSETRON 4 MG: 2 INJECTION INTRAMUSCULAR; INTRAVENOUS at 09:50

## 2022-09-22 RX ADMIN — MIDAZOLAM 2 MG: 1 INJECTION INTRAMUSCULAR; INTRAVENOUS at 09:19

## 2022-09-22 RX ADMIN — FENTANYL CITRATE 50 MCG: 50 INJECTION INTRAMUSCULAR; INTRAVENOUS at 09:50

## 2022-09-22 RX ADMIN — SODIUM CHLORIDE, SODIUM LACTATE, POTASSIUM CHLORIDE, AND CALCIUM CHLORIDE 100 ML/HR: .6; .31; .03; .02 INJECTION, SOLUTION INTRAVENOUS at 08:10

## 2022-09-22 RX ADMIN — PROPOFOL 200 MG: 10 INJECTION, EMULSION INTRAVENOUS at 09:25

## 2022-09-22 RX ADMIN — KETOROLAC TROMETHAMINE 15 MG: 30 INJECTION, SOLUTION INTRAMUSCULAR at 09:54

## 2022-09-22 RX ADMIN — GLYCOPYRROLATE 0.1 MG: 0.2 INJECTION, SOLUTION INTRAMUSCULAR; INTRAVENOUS at 09:50

## 2022-09-22 RX ADMIN — SODIUM CHLORIDE, SODIUM LACTATE, POTASSIUM CHLORIDE, AND CALCIUM CHLORIDE: .6; .31; .03; .02 INJECTION, SOLUTION INTRAVENOUS at 09:00

## 2022-09-22 RX ADMIN — FENTANYL CITRATE 25 MCG: 50 INJECTION INTRAMUSCULAR; INTRAVENOUS at 09:27

## 2022-09-22 RX ADMIN — LIDOCAINE HYDROCHLORIDE 100 MG: 10 INJECTION, SOLUTION EPIDURAL; INFILTRATION; INTRACAUDAL; PERINEURAL at 09:25

## 2022-09-22 RX ADMIN — Medication 25 MCG: at 10:20

## 2022-09-22 RX ADMIN — IBUPROFEN 600 MG: 600 TABLET, FILM COATED ORAL at 12:05

## 2022-09-22 RX ADMIN — DEXAMETHASONE SODIUM PHOSPHATE 10 MG: 10 INJECTION, SOLUTION INTRAMUSCULAR; INTRAVENOUS at 09:50

## 2022-09-22 NOTE — INTERVAL H&P NOTE
H&P reviewed  After examining the patient I find no changes in the patients condition since the H&P had been written      Vitals:    09/22/22 0756   BP: 126/82   Pulse: 68   Resp: 20   Temp: 97 7 °F (36 5 °C)   SpO2: 96%

## 2022-09-22 NOTE — ANESTHESIA POSTPROCEDURE EVALUATION
Post-Op Assessment Note    CV Status:  Stable    Pain management: adequate     Mental Status:  Alert and awake   Hydration Status:  Euvolemic and stable   PONV Controlled:  Controlled   Airway Patency:  Patent and adequate      Post Op Vitals Reviewed: Yes      Staff: CRNA         No complications documented      BP      Temp 98 °F (36 7 °C) (09/22/22 1011)    Pulse 90 (09/22/22 1011)   Resp      SpO2
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
## 2022-09-27 ENCOUNTER — SOCIAL WORK (OUTPATIENT)
Dept: BEHAVIORAL/MENTAL HEALTH CLINIC | Facility: CLINIC | Age: 32
End: 2022-09-27
Payer: COMMERCIAL

## 2022-09-27 ENCOUNTER — OFFICE VISIT (OUTPATIENT)
Dept: PSYCHIATRY | Facility: CLINIC | Age: 32
End: 2022-09-27
Payer: COMMERCIAL

## 2022-09-27 DIAGNOSIS — F43.10 PTSD (POST-TRAUMATIC STRESS DISORDER): ICD-10-CM

## 2022-09-27 DIAGNOSIS — F32.1 CURRENT MODERATE EPISODE OF MAJOR DEPRESSIVE DISORDER, UNSPECIFIED WHETHER RECURRENT (HCC): Primary | ICD-10-CM

## 2022-09-27 DIAGNOSIS — F41.9 ANXIETY: ICD-10-CM

## 2022-09-27 DIAGNOSIS — F41.9 ANXIETY AND DEPRESSION: Primary | ICD-10-CM

## 2022-09-27 DIAGNOSIS — F32.A ANXIETY AND DEPRESSION: Primary | ICD-10-CM

## 2022-09-27 PROCEDURE — 99214 OFFICE O/P EST MOD 30 MIN: CPT | Performed by: STUDENT IN AN ORGANIZED HEALTH CARE EDUCATION/TRAINING PROGRAM

## 2022-09-27 PROCEDURE — 90834 PSYTX W PT 45 MINUTES: CPT | Performed by: COUNSELOR

## 2022-09-27 RX ORDER — DULOXETIN HYDROCHLORIDE 60 MG/1
60 CAPSULE, DELAYED RELEASE ORAL DAILY
Qty: 40 CAPSULE | Refills: 0 | Status: SHIPPED | OUTPATIENT
Start: 2022-09-27 | End: 2022-11-06

## 2022-09-27 RX ORDER — PRAZOSIN HYDROCHLORIDE 2 MG/1
2 CAPSULE ORAL
Qty: 40 CAPSULE | Refills: 0 | Status: SHIPPED | OUTPATIENT
Start: 2022-09-27 | End: 2022-11-06
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
## 2022-09-28 PROCEDURE — 88305 TISSUE EXAM BY PATHOLOGIST: CPT | Performed by: PATHOLOGY

## 2022-10-04 ENCOUNTER — TELEPHONE (OUTPATIENT)
Dept: PSYCHIATRY | Facility: CLINIC | Age: 32
End: 2022-10-04

## 2022-10-04 ENCOUNTER — SOCIAL WORK (OUTPATIENT)
Dept: BEHAVIORAL/MENTAL HEALTH CLINIC | Facility: CLINIC | Age: 32
End: 2022-10-04
Payer: COMMERCIAL

## 2022-10-04 DIAGNOSIS — F41.9 ANXIETY AND DEPRESSION: Primary | ICD-10-CM

## 2022-10-04 DIAGNOSIS — F32.A ANXIETY AND DEPRESSION: Primary | ICD-10-CM

## 2022-10-04 PROCEDURE — 90834 PSYTX W PT 45 MINUTES: CPT | Performed by: COUNSELOR
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
## 2022-10-04 NOTE — TELEPHONE ENCOUNTER
Called Patient left a message regarding appointment scheduled 10/11 with LEANA Ang will be cancel due to our office will be close due to a active Drill

## 2022-10-10 ENCOUNTER — OFFICE VISIT (OUTPATIENT)
Dept: OBGYN CLINIC | Facility: CLINIC | Age: 32
End: 2022-10-10

## 2022-10-10 VITALS
WEIGHT: 213 LBS | DIASTOLIC BLOOD PRESSURE: 80 MMHG | HEIGHT: 63 IN | SYSTOLIC BLOOD PRESSURE: 122 MMHG | BODY MASS INDEX: 37.74 KG/M2

## 2022-10-10 DIAGNOSIS — N89.8 VAGINAL DISCHARGE: ICD-10-CM

## 2022-10-10 DIAGNOSIS — Z09 POSTOP CHECK: Primary | ICD-10-CM

## 2022-10-10 PROCEDURE — 81514 NFCT DS BV&VAGINITIS DNA ALG: CPT | Performed by: OBSTETRICS & GYNECOLOGY

## 2022-10-10 PROCEDURE — 99024 POSTOP FOLLOW-UP VISIT: CPT | Performed by: OBSTETRICS & GYNECOLOGY

## 2022-10-10 NOTE — PROGRESS NOTES
Assessment/Plan:  Reviewed pathology of D&C revealing benign endometrium  All restrictions lifted  Culture was obtained for bacteria vaginosis  She will keep a menstrual diary over the next 2-3 months  Resume annual gyn exams with her gyn provider as scheduled  No problem-specific Assessment & Plan notes found for this encounter  Diagnoses and all orders for this visit:    Postop check    Vaginal discharge  -     Molecular Vaginal Panel          Subjective:      Patient ID: Bijan Whelan is a 32 y o  female  HPI     66-year-old female  ( x3), status post NovaSure ablation, D and C hysteroscopy, postop 2 5 weeks  Complaining of discharge, denies odor  She has had more of a slight serosanguineous discharge  No changes in bowel or bladder function  The following portions of the patient's history were reviewed and updated as appropriate: allergies, current medications, past family history, past medical history, past social history, past surgical history and problem list     Review of Systems   Constitutional: Negative for fatigue, fever and unexpected weight change  Respiratory: Negative for cough, chest tightness, shortness of breath and wheezing  Cardiovascular: Negative  Negative for chest pain and palpitations  Gastrointestinal: Negative  Negative for abdominal distention, abdominal pain, blood in stool, constipation, diarrhea, nausea and vomiting  Genitourinary: Negative  Negative for difficulty urinating, dyspareunia, dysuria, flank pain, frequency, genital sores, hematuria, pelvic pain, urgency, vaginal bleeding, vaginal discharge and vaginal pain  Skin: Negative for rash  Objective:      /80   Ht 5' 3" (1 6 m)   Wt 96 6 kg (213 lb)   LMP  (LMP Unknown)   BMI 37 73 kg/m²          Physical Exam  Constitutional:       Appearance: Normal appearance  Abdominal:      General: There is no distension  Tenderness: There is no abdominal tenderness   There is no guarding or rebound  Genitourinary:     Labia:         Right: No rash, tenderness or lesion  Left: No rash, tenderness or lesion  Vagina: No signs of injury  Vaginal discharge present  No tenderness  Cervix: No cervical motion tenderness, discharge, friability, lesion, erythema or cervical bleeding  Uterus: Not enlarged and not tender  Adnexa:         Right: No mass, tenderness or fullness  Left: No mass, tenderness or fullness  Neurological:      Mental Status: She is alert and oriented to person, place, and time  Psychiatric:         Behavior: Behavior normal        slight serosanguineous discharge, vaginal pH elevated slightly

## 2022-10-11 LAB
C GLABRATA DNA VAG QL NAA+PROBE: NEGATIVE
C KRUSEI DNA VAG QL NAA+PROBE: NEGATIVE
CANDIDA SP 6 PNL VAG NAA+PROBE: NEGATIVE
T VAGINALIS DNA VAG QL NAA+PROBE: NEGATIVE
VAGINOSIS/ITIS DNA PNL VAG PROBE+SIG AMP: NEGATIVE

## 2022-10-18 ENCOUNTER — SOCIAL WORK (OUTPATIENT)
Dept: BEHAVIORAL/MENTAL HEALTH CLINIC | Facility: CLINIC | Age: 32
End: 2022-10-18
Payer: COMMERCIAL

## 2022-10-18 ENCOUNTER — TELEPHONE (OUTPATIENT)
Dept: NEUROLOGY | Facility: CLINIC | Age: 32
End: 2022-10-18

## 2022-10-18 DIAGNOSIS — F32.A ANXIETY AND DEPRESSION: Primary | ICD-10-CM

## 2022-10-18 DIAGNOSIS — F41.9 ANXIETY AND DEPRESSION: Primary | ICD-10-CM

## 2022-10-18 PROCEDURE — 90834 PSYTX W PT 45 MINUTES: CPT | Performed by: COUNSELOR

## 2022-10-18 NOTE — PSYCH
Psychotherapy Provided: Individual Psychotherapy 50 minutes     Length of time in session: 50 minutes, follow up in 1 week    Encounter Diagnosis     ICD-10-CM    1  Anxiety and depression  F41 9     F32  A        Goals addressed in session: Goal 1 and Goal 2     Pain:      none    0    Current suicide risk : Low     Data: Michel discussed her alone time, sexual issues in her current intimate relationship and lack of intimacy and passion, and identified major unmet needs that she was educated about from Attachment theory point  Person-Centered, TF-CBT, and DBT-based techniques also helped Pt work on her trauma narrative and express her emotions as well as keep more neutral perspective on them, so that they would not affect her so intensely  Assessment: Michel appeared very talkative, expressive, descriptive, and emotional  She did well in identifying unmet needs and expressed willingness to work on planning to meet them  Plan: Michel wants to come weekly until she feels better and ready to transition to less frequent therapy schedule  She will work on the [address] worksheet to identify all unmet needs and feelings around them and will consider planning to meet them  Behavioral Health Treatment Plan [facility]: Diagnosis and Treatment Plan explained to Smith Nixon relates understanding diagnosis and is agreeable to Treatment Plan   Yes     Visit Time    Visit Start Time: 3:59 PM  Visit Stop Time: 4:50 PM  Total Visit Duration: 51 minutes

## 2022-10-27 ENCOUNTER — CONSULT (OUTPATIENT)
Dept: NEUROLOGY | Facility: CLINIC | Age: 32
End: 2022-10-27

## 2022-10-27 VITALS
BODY MASS INDEX: 39.45 KG/M2 | DIASTOLIC BLOOD PRESSURE: 94 MMHG | HEART RATE: 80 BPM | WEIGHT: 222.7 LBS | SYSTOLIC BLOOD PRESSURE: 132 MMHG

## 2022-10-27 DIAGNOSIS — G47.00 INSOMNIA: ICD-10-CM

## 2022-10-27 DIAGNOSIS — F32.A ANXIETY AND DEPRESSION: ICD-10-CM

## 2022-10-27 DIAGNOSIS — G43.101 MIGRAINE WITH AURA AND WITH STATUS MIGRAINOSUS, NOT INTRACTABLE: Primary | ICD-10-CM

## 2022-10-27 DIAGNOSIS — F41.9 ANXIETY AND DEPRESSION: ICD-10-CM

## 2022-10-27 DIAGNOSIS — R11.2 NAUSEA AND VOMITING: ICD-10-CM

## 2022-10-27 RX ORDER — PROCHLORPERAZINE MALEATE 10 MG
TABLET ORAL
Qty: 15 TABLET | Refills: 2 | Status: SHIPPED | OUTPATIENT
Start: 2022-10-27

## 2022-10-27 RX ORDER — TOPIRAMATE 25 MG/1
TABLET ORAL
Qty: 120 TABLET | Refills: 1 | Status: SHIPPED | OUTPATIENT
Start: 2022-10-27

## 2022-10-27 RX ORDER — RIZATRIPTAN BENZOATE 10 MG/1
10 TABLET, ORALLY DISINTEGRATING ORAL ONCE AS NEEDED
Qty: 10 TABLET | Refills: 3 | Status: SHIPPED | OUTPATIENT
Start: 2022-10-27

## 2022-10-27 NOTE — PROGRESS NOTES
[facility] Consult  PATIENT:  Diane James  MRN:  [id number]  :  1990  DATE OF SERVICE:  10/27/2022  REFERRED BY: ANNEMARIE Robertson  PMD: ANNEMARIE Boudreaux    Assessment/Plan:     Diane James is a delightful 32 y o  female with a past medical history that includes asthma, migraines, Charcot Rosy tooth, obesity, hyperlipidemia, anxiety, depression referred here for evaluation of headache  Initial evaluation 10/27/2022     Ms Alex Billings reports a longstanding history of headaches since she was a child  She also has a strong family history of migraines in her mother, grandmother and all 3 of her daughters  They are also multiple possible exacerbating factors for her migraines including anxiety and depression as well as insomnia  She follows with another neurologist for her Charcot-Rosy-Tooth and they have tried limited medications for her headaches without any significant response  We discussed a variety of treatment options but thought that Topamax would be a good choice  From an abortive standpoint, she reported a prior reaction to sumatriptan but I am unsure that this was actually related to the medication itself and may have been more headache related  Nonetheless, I will have her try Maxalt to see if she has any benefit with that  As rescue I have also prescribed Compazine which she can combine with Benadryl and ibuprofen  She has been encouraged to keep me updated throughout the process and let me know if there are any issues or concerns  Migraine with aura and with status migrainosus, not intractable  -     topiramate (TOPAMAX) 25 mg tablet; 1 tab PO QHS for 1 week, increase as tolerated to 1 tab BID for 1 week, then 1 tab QAM and 2 tabs QHS for 1 week and finish at 2 tabs BID  -     rizatriptan (MAXALT-MLT) 10 mg disintegrating tablet;  Take 1 tablet (10 mg total) by mouth once as needed for migraine for up to 1 dose May repeat in 2 hours if needed  -     prochlorperazine (COMPAZINE) 10 mg tablet; One tab up to t i d  p r n  Migraine  Max 3 days per week  Anxiety and depression  Insomnia  Nausea and vomiting  -     prochlorperazine (COMPAZINE) 10 mg tablet; One tab up to t i d  p r n  Migraine  Max 3 days per week  Workup:  - Neurologic assessment reveals unremarkable neurological exam   - With no new or concerning symptoms, no red flags and an unremarkable neurologic exam, there is no specific indication for further evaluation with MRI brain  However, this could be obtained at any time if indicated  Preventative:  - we discussed headache hygiene and lifestyle factors that may improve headaches  - Topamax with goal 50mg BID  - Currently on through other providers: Cymbalta (mood)  - Past/ failed/contraindicated: None  - future options:  CGRP med, botox    Acute:  - discussed not taking over-the-counter or prescription pain medications more than 3 days per week to prevent medication overuse/rebound headache  - Maxalt 10mg ODT  - Currently on through other providers: None  - Past/ failed/contraindicated: Sumatriptan failed (possible odd side effects as below)  - future options:  Triptan, Toradol IM or p o , could consider trial of 5 days of Depakote 500 mg nightly or dexamethasone 2 mg daily for prolonged migraine, ubrelvy, reyvow, nurtec    Rescue:  - Combine 10mg Reglan (Metoclopramide), 25mg Benadryl and 600mg Ibuprofen (recommend rest/sleep after this combination)  Patient instructions   Headache Calendar  Please maintain a headache calendar  Consider using phone applications such as Migraine Peter or [place] Migraine Tracker    Headache/migraine treatment:   Acute medications (for immediate treatment of a headache):    It is ok to take ibuprofen, acetaminophen or naproxen (Advil, Tylenol,  Aleve, Excedrin) if they help your headaches you should limit these to No more than 2-3 times a week to avoid medication overuse/rebound headaches  For your more moderate to severe migraines take this medication early  Maxalt (rizatriptan) 10mg tabs - take one at the onset of headache  May repeat one time after 2 hours if pain has not resolved  (Max 2 a day and 10 a month)     Rescue medications (for prolonged or intractable headache)  - Combine 10mg Compazine, 25mg Benadryl and 600mg Ibuprofen (recommend rest/sleep after this combination)    Prescription preventive medications for headaches/migraines   (to take every day to help prevent headaches - not to take at the time of headache):  [x] - Topiramate 25 mg nightly for 1 week, then increase to 25 mg in a m  And 25 mg in p m  For 1 week, then take 25 mg in a m  And 50 mg in p m  For 1 week, then take 50 mg in a m  and 50 mg in p m  And continue  - generally the common side effects improve as your body gets used to the medication  If we need to spread out a more gradual increase of the medication on a longer scale we can, just call if any questions or concerns  - if necessary, if the a m  dose is causing side effects we can always have you take the full dose at night instead    *Typically these types of medications take time until you see the benefit, although some may see improvement in days, often it may take weeks, especially if the medication is being titrated up to a beneficial level  Please contact us if there are any concerns or questions regarding the medication  Sleep and headache prevention:   [x] Melatonin - you may take 1-3 mg nightly for sleep or headache prevention  You should take this at sundown consistently every night for it to work  It works by gradually helping to adjust your sleep time over days to weeks, rather than immediately making you feel sleepy  Lifestyle Recommendations:  [x] SLEEP - Maintain a regular sleep schedule: Adults need at least 7-8 hours of uninterrupted a night   Maintain good sleep hygiene:  Going to bed and waking up at consistent times, avoiding excessive daytime naps, avoiding caffeinated beverages in the evening, avoid excessive stimulation in the evening and generally using bed primarily for sleeping  One hour before bedtime would recommend turning lights down lower, decreasing your activity (may read quietly, listen to music at a low volume)  When you get into bed, should eliminate all technology (no texting, emailing, playing with your phone, iPad or tablet in bed)  [x] HYDRATION - Maintain good hydration  Drink  2L of fluid a day (4 typical small water bottles)  [x] DIET - Maintain good nutrition  In particular don't skip meals and try and eat healthy balanced meals regularly  [x] TRIGGERS - Look for other triggers and avoid them: Limit caffeine to 1-2 cups a day or less  Avoid dietary triggers that you have noticed bring on your headaches (this could include aged cheese, peanuts, MSG, aspartame and nitrates)  [x] EXERCISE - physical exercise as we all know is good for you in many ways, and not only is good for your heart, but also is beneficial for your mental health, cognitive health and  chronic pain/headaches  I would encourage at the least 5 days of physical exercise weekly for at least 30 minutes  Education and Follow-up  [x] Please call with any questions or concerns  Of course if any new concerning symptoms go to the emergency department  [x] Follow up in 3 months  CC: We had the pleasure of evaluating Jacob Rebolledo in neurological consultation today  Jacob Rebolledo is a   right handed female who presents today for evaluation of headaches  History obtained from patient as well as available medical record review  History of Present Illness:   Current medical illnesses  or past medical history include asthma, migraines, Charcot Rosy tooth, obesity, hyperlipidemia, anxiety, depression    Pertinent history:  - seen by primary care 08/25/2022 with reports of ongoing headaches    Was prescribed oral steroids at that time     Headaches started at what age? Since childhood  How often do the headaches occur?   - as of 10/27/2022: 15/30 (12 are severe)  What time of the day do the headaches start? No particular time of day   How long do the headaches last? Days up to a week at times  Are you ever headache free? Yes     Aura? with aura - green spots in vision b/l and blurry along the periphery, usually occurs with the pain, not prior     Where is your headache located and pain quality? Left side of head or right occiput to the right eye; throbbing  What is the intensity of pain? Worst 10/10, Average: 6-7/10  Associated symptoms:   [x] Nausea       [x] Vomiting   [x] Stiff or sore neck   [x] Problems with concentration  [x] Photophobia     [x]Phonophobia      [x] Osmophobia  [x] Blurred vision   [x] Prefer quiet, dark room  [x] Light-headed or dizzy     [x] Tinnitus     Things that make the headache worse? Moving head up and down    Headache triggers:  Anxiety, stress    Have you seen someone else for headaches or pain? Yes, neurologist through [facility] for C[facility]  Have you had trigger point injection performed and how often? No  Have you had Botox injection performed and how often? No   Have you had epidural injections or transforaminal injections performed? Yes, for child birth  Are you current pregnant or planning on getting pregnant? No  History of tubal ligation  Have you ever had any Brain imaging? Yes, CT and CTA    Last eye exam: Unknown (upcoming appt first week of November)    What medications do you take or have you taken for your headaches?    ABORTIVE:    OTC medications: Advil, Tylenol, Excedrin, Benadryl (about 3-4 days per week)  Prescription: None    Past/ failed/contraindicated:  OTC medications: None  Prescription: Sumatriptan (felt as if she was paralyzed on one side of her body)    PREVENTIVE:   Cymbalta (anxiety)    Past/ failed/contraindicated:  None    LIFESTYLE  Sleep   - averages: about 3-4 hours  Problems falling asleep?:   Yes  Problems staying asleep?:  Yes  - Snores a little  - No history of SOLANGE  - [place] sleepiness scale total: 2    Physical activity: Nothing scheduled    Water: 3-4 large bottles per day  Caffeine: 1 cup of coffee per day    Mood:  History of anxiety and depression  - Managed by psychiatry and therapy    The following portions of the patient's history were reviewed and updated as appropriate: allergies, current medications, past family history, past medical history, past social history, past surgical history and problem list     Pertinent family history:  Family history of headaches:  migraine headaches in mother and grandmother  All 3 daughters get them as well    Any family history of aneurysms - No    Pertinent social history:  Work:  OBGYN  Education: High school  Lives with 3 girls    Illicit Drugs: medical marijuana for anxiety/depression  Alcohol/tobacco: Denies tobacco use, alcohol intake: social drinker  Past Medical History:     Past Medical History:   Diagnosis Date   • Anesthesia     per pt--after ankle surgery was to be discharged and had severe headache -blurred vision -worst migraine feeling ever" and was admitted over night"   • Anxiety    • Asthma     "sports induced " not on inhalers   • Chronic pain disorder     per pt general nerve and muscle pain--sees a specialized Neurologist at [facility] Dr Sarah Roy   • CMTD (Charcot-Rosy-Tooth disease)    • Depression    • Heart murmur    • Heavy menses     and occas just "spotting"   • History of COVID-19     twice 2020 and 2021--recovered at home   • History of transfusion    • Irregular menses    • Irritable bowel syndrome    • IUD (intrauterine device) in place    • Migraines    • Mitral valve prolapse    • Motion sickness    • Muscle weakness    • PONV (postoperative nausea and vomiting)    • Severe menstrual cramps     "at times" per pt   • Shortness of breath     per pt "with exertion and not new"   • Urinary frequency    • Vertigo        Patient Active Problem List   Diagnosis   • Generalized abdominal pain   • Urinary tract infection without hematuria   • Acute bilateral low back pain without sciatica   • Allergic reaction   • Charcot-Rosy-Tooth disease   • Chronic pain of both knees   • Colitis   • Anxiety and depression   • Family history of cardiac arrest   • Hyperlipidemia   • Large breasts   • Migraine   • Mitral valve prolapse   • Neck pain   • Neoplasm of scalp   • Myalgia   • Morbid obesity (HCC)   • Transfusion history   • Weakness of both lower extremities   • PONV (postoperative nausea and vomiting)   • Obesity (BMI 30-39  9)   • Asthma       Medications:      Current Outpatient Medications   Medication Sig Dispense Refill   • DULoxetine (CYMBALTA) 60 mg delayed release capsule Take 1 capsule (60 mg total) by mouth daily 40 capsule 0   • hydrOXYzine HCL (ATARAX) 25 mg tablet Take 1 tablet (25 mg total) by mouth every 6 (six) hours as needed for anxiety for up to 90 doses 90 tablet 0   • ibuprofen (MOTRIN) 800 mg tablet Take 1 tablet (800 mg total) by mouth every 6 (six) hours as needed for moderate pain 30 tablet 1   • prazosin (MINIPRESS) 2 mg capsule Take 1 capsule (2 mg total) by mouth daily at bedtime 40 capsule 0   • Levonorgestrel (MIRENA) 20 MCG/DAY IUD 1 each by Intrauterine route once (Patient not taking: Reported on 10/27/2022)     • norethindrone-ethinyl estradiol-iron (Loestrin Fe 1 5/30) 1 5-30 MG-MCG tablet Take 1 tablet by mouth daily (Patient not taking: Reported on 10/27/2022) 84 tablet 0   • oxyCODONE (Roxicodone) 5 immediate release tablet Take 1 tablet (5 mg total) by mouth every 6 (six) hours as needed for moderate pain Max Daily Amount: 20 mg (Patient not taking: No sig reported) 6 tablet 0     No current facility-administered medications for this visit  Allergies:       Allergies   Allergen Reactions   • Sumatriptan Other (See Comments)     Weakness, arms felt heavy Family History:     Family History   Problem Relation Age of Onset   • Heart disease Mother    • Other Mother         mutant gene "heart"   • Mitral valve prolapse Mother    • No Known Problems Father    • Heart disease Maternal Grandmother        Social History:       Social History     Socioeconomic History   • Marital status:      Spouse name: Not on file   • Number of children: Not on file   • Years of education: Not on file   • Highest education level: Not on file   Occupational History   • Not on file   Tobacco Use   • Smoking status: Never Smoker   • Smokeless tobacco: Never Used   Vaping Use   • Vaping Use: Never used   Substance and Sexual Activity   • Alcohol use: Yes     Comment: social   • Drug use: Yes     Types: Marijuana     Comment: medical card   • Sexual activity: Yes     Partners: Male     Birth control/protection: Female Sterilization   Other Topics Concern   • Not on file   Social History Narrative   • Not on file     Social Determinants of Health     Financial Resource Strain: Not on file   Food Insecurity: Not on file   Transportation Needs: Not on file   Physical Activity: Not on file   Stress: Not on file   Social Connections: Not on file   Intimate Partner Violence: Not on file   Housing Stability: Not on file         Objective:   Physical Exam:                                                                 Vitals:            Constitutional:    /94 (BP Location: Left arm, Patient Position: Sitting, Cuff Size: Adult)   Pulse 80   Wt 101 kg (222 lb 11 2 oz)   BMI 39 45 kg/m²   BP Readings from Last 3 Encounters:   10/27/22 132/94   10/10/22 122/80   09/22/22 121/81     Pulse Readings from Last 3 Encounters:   10/27/22 80   09/22/22 76   08/25/22 68         Well developed, well nourished, well groomed  No dysmorphic features  HEENT:  Normocephalic atraumatic  Oropharynx is clear and moist  No oral mucosal lesions  Chest:  Respirations regular and unlabored  Cardiovascular:  Distal extremities warm without palpable edema or tenderness, no observed significant swelling  Musculoskeletal:  (see below under neurologic exam for evaluation of motor function and gait)   Skin:  warm and dry, not diaphoretic  No apparent birthmarks or stigmata of neurocutaneous disease  Psychiatric:  Normal behavior and appropriate affect       Neurological Examination:     Mental status/cognitive function:   Orientated to time, place and person  Recent and remote memory intact  Attention span and concentration as well as fund of knowledge are appropriate for age  Normal language and spontaneous speech  Cranial Nerves:  II-visual fields full  Fundi poorly visualized due to pupillary constriction  III, IV, VI-Pupils were equal, round, and reactive to light and accomodation  Extraocular movements were full and conjugate without nystagmus  Conjugate gaze, normal smooth pursuits, normal saccades   V-facial sensation symmetric  VII-facial expression symmetric, intact forehead wrinkle, strong eye closure, symmetric smile    VIII-hearing grossly intact bilaterally   IX, X-palate elevation symmetric, no dysarthria  XI-shoulder shrug strength intact    XII-tongue protrusion midline  Motor Exam: symmetric bulk and tone throughout, no pronator drift  Power/strength 5/5 bilateral upper and 4/5 lower extremities, no atrophy, fasciculations or abnormal movements noted  Sensory: grossly intact light touch in all extremities  Reflexes: diminished b/l in the UE and LE  Coordination: Finger nose finger intact bilaterally, no apparent dysmetria, ataxia or tremor noted  Gait: steady casual and tandem gait  Pertinent lab results: None     Pertinent Imaging:   CT head without contrast 07/28/2020:  Focally increased density at the left terminal ICA which may represent thrombosis  Otherwise no intracranial mass, or midline shift  No signs of acute infarct      CTA head and neck 7/28/2020: No significant stenosis  No significant intracranial stenosis, vessel occlusion, or aneurysm  I have personally reviewed imaging and radiology read  Review of Systems:   Constitutional: Positive for appetite change  Negative for fever  HENT: Positive for tinnitus  Negative for hearing loss, trouble swallowing and voice change  Eyes: Positive for photophobia, pain and visual disturbance (floaters, blurry)  Respiratory: Negative  Negative for shortness of breath  Cardiovascular: Negative  Negative for palpitations  Gastrointestinal: Positive for nausea and vomiting  Endocrine: Negative  Negative for cold intolerance  Genitourinary: Negative  Negative for dysuria, frequency and urgency  Musculoskeletal: Positive for gait problem (balance), neck pain and neck stiffness  Negative for myalgias  Skin: Negative  Negative for rash  Allergic/Immunologic: Negative  Neurological: Positive for dizziness, tremors, weakness, light-headedness, numbness and headaches  Negative for seizures, syncope, facial asymmetry and speech difficulty  Hematological: Negative  Does not bruise/bleed easily  Psychiatric/Behavioral: Positive for confusion (memory) and sleep disturbance  Negative for hallucinations  All other systems reviewed and are negative  I have spent 42 minutes with the patient today in which greater than 50% of this time was spent in counseling/coordination of care regarding Prognosis, Risks and benefits of tx options and Impressions  I also spent 15 minutes non face to face for this patient the same day       Activity Minutes   Precharting/reviewing 10   Patient care/counseling  42   Postcharting/care coordination 5       Author:  Mae Gibbs 10/27/2022 2:47 PM

## 2022-10-27 NOTE — PROGRESS NOTES
Review of Systems   Constitutional: Positive for appetite change  Negative for fever  HENT: Positive for tinnitus  Negative for hearing loss, trouble swallowing and voice change  Eyes: Positive for photophobia, pain and visual disturbance (floaters, blurry)  Respiratory: Negative  Negative for shortness of breath  Cardiovascular: Negative  Negative for palpitations  Gastrointestinal: Positive for nausea and vomiting  Endocrine: Negative  Negative for cold intolerance  Genitourinary: Negative  Negative for dysuria, frequency and urgency  Musculoskeletal: Positive for gait problem (balance), neck pain and neck stiffness  Negative for myalgias  Skin: Negative  Negative for rash  Allergic/Immunologic: Negative  Neurological: Positive for dizziness, tremors, weakness, light-headedness, numbness and headaches  Negative for seizures, syncope, facial asymmetry and speech difficulty  Hematological: Negative  Does not bruise/bleed easily  Psychiatric/Behavioral: Positive for confusion (memory) and sleep disturbance  Negative for hallucinations  All other systems reviewed and are negative

## 2022-10-27 NOTE — PATIENT INSTRUCTIONS
Headache Calendar  Please maintain a headache calendar  Consider using phone applications such as Migraine Peter or [place] Migraine Tracker    Headache/migraine treatment:   Acute medications (for immediate treatment of a headache): It is ok to take ibuprofen, acetaminophen or naproxen (Advil, Tylenol,  Aleve, Excedrin) if they help your headaches you should limit these to No more than 2-3 times a week to avoid medication overuse/rebound headaches  For your more moderate to severe migraines take this medication early  Maxalt (rizatriptan) 10mg tabs - take one at the onset of headache  May repeat one time after 2 hours if pain has not resolved  (Max 2 a day and 10 a month)     Rescue medications (for prolonged or intractable headache)  - Combine 10mg Compazine, 25mg Benadryl and 600mg Ibuprofen (recommend rest/sleep after this combination)    Prescription preventive medications for headaches/migraines   (to take every day to help prevent headaches - not to take at the time of headache):  [x] - Topiramate 25 mg nightly for 1 week, then increase to 25 mg in a m  And 25 mg in p m  For 1 week, then take 25 mg in a m  And 50 mg in p m  For 1 week, then take 50 mg in a m  and 50 mg in p m  And continue  - generally the common side effects improve as your body gets used to the medication  If we need to spread out a more gradual increase of the medication on a longer scale we can, just call if any questions or concerns  - if necessary, if the a m  dose is causing side effects we can always have you take the full dose at night instead    *Typically these types of medications take time until you see the benefit, although some may see improvement in days, often it may take weeks, especially if the medication is being titrated up to a beneficial level  Please contact us if there are any concerns or questions regarding the medication       Sleep and headache prevention:   [x] Melatonin - you may take 1-3 mg nightly for sleep or headache prevention  You should take this at sundown consistently every night for it to work  It works by gradually helping to adjust your sleep time over days to weeks, rather than immediately making you feel sleepy  Lifestyle Recommendations:  [x] SLEEP - Maintain a regular sleep schedule: Adults need at least 7-8 hours of uninterrupted a night  Maintain good sleep hygiene:  Going to bed and waking up at consistent times, avoiding excessive daytime naps, avoiding caffeinated beverages in the evening, avoid excessive stimulation in the evening and generally using bed primarily for sleeping  One hour before bedtime would recommend turning lights down lower, decreasing your activity (may read quietly, listen to music at a low volume)  When you get into bed, should eliminate all technology (no texting, emailing, playing with your phone, iPad or tablet in bed)  [x] HYDRATION - Maintain good hydration  Drink  2L of fluid a day (4 typical small water bottles)  [x] DIET - Maintain good nutrition  In particular don't skip meals and try and eat healthy balanced meals regularly  [x] TRIGGERS - Look for other triggers and avoid them: Limit caffeine to 1-2 cups a day or less  Avoid dietary triggers that you have noticed bring on your headaches (this could include aged cheese, peanuts, MSG, aspartame and nitrates)  [x] EXERCISE - physical exercise as we all know is good for you in many ways, and not only is good for your heart, but also is beneficial for your mental health, cognitive health and  chronic pain/headaches  I would encourage at the least 5 days of physical exercise weekly for at least 30 minutes  Education and Follow-up  [x] Please call with any questions or concerns  Of course if any new concerning symptoms go to the emergency department    [x] Follow up in 3 months

## 2022-10-31 ENCOUNTER — OFFICE VISIT (OUTPATIENT)
Dept: PSYCHIATRY | Facility: CLINIC | Age: 32
End: 2022-10-31

## 2022-10-31 DIAGNOSIS — F32.1 CURRENT MODERATE EPISODE OF MAJOR DEPRESSIVE DISORDER, UNSPECIFIED WHETHER RECURRENT (HCC): Primary | ICD-10-CM

## 2022-10-31 DIAGNOSIS — G47.00 INSOMNIA, UNSPECIFIED TYPE: ICD-10-CM

## 2022-10-31 DIAGNOSIS — F43.10 PTSD (POST-TRAUMATIC STRESS DISORDER): ICD-10-CM

## 2022-10-31 DIAGNOSIS — F41.9 ANXIETY: ICD-10-CM

## 2022-10-31 RX ORDER — BUPROPION HYDROCHLORIDE 150 MG/1
150 TABLET ORAL DAILY
Qty: 30 TABLET | Refills: 1 | Status: SHIPPED | OUTPATIENT
Start: 2022-10-31 | End: 2022-12-15

## 2022-10-31 RX ORDER — DULOXETIN HYDROCHLORIDE 60 MG/1
60 CAPSULE, DELAYED RELEASE ORAL DAILY
Qty: 45 CAPSULE | Refills: 0 | Status: SHIPPED | OUTPATIENT
Start: 2022-10-31 | End: 2022-12-15

## 2022-10-31 RX ORDER — HYDROXYZINE HYDROCHLORIDE 25 MG/1
25 TABLET, FILM COATED ORAL EVERY 6 HOURS PRN
Qty: 90 TABLET | Refills: 0 | Status: SHIPPED | OUTPATIENT
Start: 2022-10-31

## 2022-10-31 RX ORDER — PRAZOSIN HYDROCHLORIDE 2 MG/1
2 CAPSULE ORAL
Qty: 45 CAPSULE | Refills: 0 | Status: SHIPPED | OUTPATIENT
Start: 2022-10-31 | End: 2022-12-10

## 2022-10-31 NOTE — PSYCH
MEDICATION MANAGEMENT NOTE        [facility]      Name and Date of Birth:  Noemi Altamirano 28 y o  1990 MRN: [id number]    Date of Visit: October 31, 2022    Reason for Visit:   Chief Complaint   Patient presents with   • Follow-up       SUBJECTIVE:    Annabel Bradley is seen today for a follow up for Major Depressive Disorder and anxiety  She continues to do relatively well since the last visit  She reports significant depressive symptoms and significant anxiety symptoms  Patient is currently on Cymbalta 60 mg, prazosin 2 mg and hydroxyzine 25 mg as needed  She reports compliance with her medications and reports mild side effects  She reported that her anxiety and depression improved significantly after increasing Cymbalta 30-60 mg  She also reported that her nightmares are almost gone after increasing prazosin from 1-2 mg  For recurrent residual symptoms she complains of from difficulty returning back to sleep  She falls asleep easy after taking her night medications however she walks up around midnight and stays awake until the morning time  She sleeps around 4 hours per day which makes her tired in the morning  She reported that Tylenol p m  is helpful for her insomnia  She reports sexual side effects after increasing Cymbalta from 30-60 and complains from delete orgasm or anorgasmia  She felt a side effect happened shortly after increasing the medication dose  She continues to do well in therapy and she is scheduled for therapy for this week and after 2 weeks  She denies any suicidal ideation, intent or plan at present; denies any homicidal ideation, intent or plan at present  She denies any auditory hallucinations, denies any visual hallucinations, denies any delusions  She reports tiredness      HPI ROS Appetite Changes and Sleep:     She reports interrupted sleep, normal appetite, decreased energy      Review Of Systems: Constitutional low energy   ENT negative   Cardiovascular negative   Respiratory negative   Gastrointestinal negative   Genitourinary dysmenorrhea   Musculoskeletal foot pain   Integumentary negative   Neurological neuropathic pain, numbness and paresthesias   Endocrine negative   Other Symptoms none, all other systems are negative         Past Medical History:    Past Medical History:   Diagnosis Date   • Anesthesia     per pt--after ankle surgery was to be discharged and had severe headache -blurred vision -worst migraine feeling ever" and was admitted over night"   • Anxiety    • Asthma     "sports induced " not on inhalers   • Chronic pain disorder     per pt general nerve and muscle pain--sees a specialized Neurologist at [facility] Dr Beau Coyle   • CMTD (Charcot-Rosy-Tooth disease)    • Depression    • Heart murmur    • Heavy menses     and occas just "spotting"   • History of COVID-19     twice 2020 and 2021--recovered at home   • History of transfusion    • Irregular menses    • Irritable bowel syndrome    • IUD (intrauterine device) in place    • Migraines    • Mitral valve prolapse    • Motion sickness    • Muscle weakness    • PONV (postoperative nausea and vomiting)    • Severe menstrual cramps     "at times" per pt   • Shortness of breath     per pt "with exertion and not new"   • Urinary frequency    • Vertigo         Past Surgical History:   Procedure Laterality Date   • ANKLE SURGERY Right     per pt tendon graph implanted and possible metal implant   • DILATION AND CURETTAGE OF UTERUS     • DILATION AND CURETTAGE OF UTERUS WITH HYSTEROSOCPY N/A 9/22/2022    Procedure: DILATATION AND CURETTAGE (D&C) WITH HYSTEROSCOPY;  Surgeon: Enriqueta Mast DO;  [facility];  Service: Gynecology   • INSERTION OF INTRAUTERINE DEVICE (IUD)     • CA HYSTEROSCOPY,W/ENDOMETRIAL ABLATION N/A 9/22/2022    Procedure: ABLATION ENDOMETRIAL Rohini Ashley;  Surgeon: Enriqueta Mast DO;  [facility]; Service: Gynecology   • SC REMOVE INTRAUTERINE DEVICE N/A 9/22/2022    Procedure: REMOVAL OF INTRAUTERINE DEVICE (IUD); Surgeon: Ellie Bumpers, DO;  Location[facility];  Service: Gynecology   • TUBAL LIGATION     • WISDOM TOOTH EXTRACTION       Allergies   Allergen Reactions   • Sumatriptan Other (See Comments)     Weakness, arms felt heavy       Substance Abuse History:    Social History     Substance and Sexual Activity   Alcohol Use Yes    Comment: social     Social History     Substance and Sexual Activity   Drug Use Yes   • Types: Marijuana    Comment: medical card       Social History:    Social History     Socioeconomic History   • Marital status:      Spouse name: Not on file   • Number of children: Not on file   • Years of education: Not on file   • Highest education level: Not on file   Occupational History   • Not on file   Tobacco Use   • Smoking status: Never Smoker   • Smokeless tobacco: Never Used   Vaping Use   • Vaping Use: Never used   Substance and Sexual Activity   • Alcohol use: Yes     Comment: social   • Drug use: Yes     Types: Marijuana     Comment: medical card   • Sexual activity: Yes     Partners: Male     Birth control/protection: Female Sterilization   Other Topics Concern   • Not on file   Social History Narrative   • Not on file     Social Determinants of Health     Financial Resource Strain: Not on file   Food Insecurity: Not on file   Transportation Needs: Not on file   Physical Activity: Not on file   Stress: Not on file   Social Connections: Not on file   Intimate Partner Violence: Not on file   Housing Stability: Not on file       Family Psychiatric History:     Family History   Problem Relation Age of Onset   • Heart disease Mother    • Other Mother         mutant gene "heart"   • Mitral valve prolapse Mother    • No Known Problems Father    • Heart disease Maternal Grandmother        History Review:  The following portions of the patient's history were reviewed and updated as appropriate: allergies, current medications, past family history, past medical history, past social history, past surgical history and problem list          OBJECTIVE:     Vital signs in last 24 hours: There were no vitals filed for this visit      Mental Status Evaluation:    Appearance age appropriate, casually dressed   Behavior cooperative, calm   Speech normal rate, normal volume, normal pitch   Mood improved, anxious   Affect normal range and intensity, appropriate   Thought Processes organized, goal directed   Associations intact associations   Thought Content no overt delusions   Perceptual Disturbances: no auditory hallucinations, no visual hallucinations   Abnormal Thoughts  Risk Potential Suicidal ideation - None  Homicidal ideation - None  Potential for aggression - No   Orientation oriented to person, place, time/date and situation   Memory recent and remote memory grossly intact   Consciousness alert and awake   Attention Span Concentration Span attention span and concentration are age appropriate   Intellect appears to be of average intelligence   Insight intact   Judgement intact   Muscle Strength and  Gait decreased muscle strength, abnormal gait   Motor activity no abnormal movements   Language no difficulty naming common objects, no difficulty repeating a phrase, no difficulty writing a sentence   Fund of Knowledge adequate knowledge of current events  adequate fund of knowledge regarding past history  adequate fund of knowledge regarding vocabulary    Pain moderate   Pain Scale 5       Laboratory Results: I have personally reviewed all pertinent laboratory/tests results    Most Recent Labs:   Lab Results   Component Value Date    WBC 7 00 06/08/2022    RBC 4 57 06/08/2022    HGB 13 4 06/08/2022    HCT 39 2 06/08/2022     06/08/2022    RDW 13 3 06/08/2022    NEUTROABS 4 80 06/08/2022     02/23/2015    K 3 6 06/08/2022     (H) 06/08/2022    CO2 24 06/08/2022    BUN 8 06/08/2022    CREATININE 0 55 (L) 06/08/2022    GLUCOSE 66 02/23/2015    CALCIUM 9 0 06/08/2022    AST 11 06/08/2022    ALT 18 06/08/2022    ALKPHOS 41 (L) 06/08/2022    PROT 7 7 02/23/2015    BILITOT 0 4 02/23/2015    CHOLESTEROL 209 (H) 08/24/2022    TRIG 249 (H) 08/24/2022    HDL 35 (L) 08/24/2022    LDLCALC 124 (H) 08/24/2022    [place] 174 08/24/2022    PREGSERUM Negative 06/08/2022    HCGQUANT <2 05/31/2021    RPR Non-Reactive 12/27/2021       Suicide/Homicide Risk Assessment:    Risk of Harm to Self:  The following ratings are based on assessment at the time of the interview  Based on today's assessment, Winston Jackson presents the following risk of harm to self: minimal    Risk of Harm to Others: The following ratings are based on assessment at the time of the interview  Based on today's Todd Gaviria presents the following risk of harm to others: minimal    The following interventions are recommended: no intervention changes needed    Assessment/Plan:  Diana Velazquez is a 46years old adult female patient was seen today in the office for follow-up  His last visits started patient on Cymbalta 30 milligram and prazosin 1 milligram at bedtime help with her anxiety, depression and PTSD symptoms  Patient showed partial response and was increased to Cymbalta 60 mg and prazosin 2 mg and symptoms continues to improve however she complained from sexual side effects from the medication  I offered patient to decrease the dose of Cymbalta versus switching to Trintellix versus adding Wellbutrin to help with her sexual side effects  She feels the Cymbalta was helpful for her anxiety and depression and would like to continue the medication without decreasing the dosage so she chose to add Wellbutrin as she tried Wellbutrin before without having sexual side effects  For her insomnia I discussed with her the sleep hygiene and also advised to use hydroxyzine at bedtime at it will probably be effective since Tylenol p m  was working       Diagnoses and all orders for this visit:    Current moderate episode of major depressive disorder, unspecified whether recurrent (HCC)  -     DULoxetine (CYMBALTA) 60 mg delayed release capsule; Take 1 capsule (60 mg total) by mouth daily  -     buPROPion (Wellbutrin XL) 150 mg 24 hr tablet; Take 1 tablet (150 mg total) by mouth daily    PTSD (post-traumatic stress disorder)  -     prazosin (MINIPRESS) 2 mg capsule; Take 1 capsule (2 mg total) by mouth daily at bedtime    Insomnia, unspecified type       - use Tylenol p m  or hydroxyzine at bedtime    Anxiety  -     DULoxetine (CYMBALTA) 60 mg delayed release capsule; Take 1 capsule (60 mg total) by mouth daily  -     hydrOXYzine HCL (ATARAX) 25 mg tablet; Take 1 tablet (25 mg total) by mouth every 6 (six) hours as needed for anxiety for up to 90 doses          Treatment Recommendations/Precautions:    Continue Cymbalta 60 mg daily to improve depressive symptoms  Add Wellbutrin 150 mg XL for sexual side effects   Continue Prazosin 2 mg at bedtime to help with PTSD symptoms  Continue Hydroxyzine 25 mg four times a day as needed to improve anxiety symptoms  Medication management every 4 weeks  Continue psychotherapy with [facility] therapist Daly Alaniz  Aware of 24 hour and weekend coverage for urgent situations accessed by calling [facility] main practice number    Medications Risks/Benefits      Risks, Benefits And Possible Side Effects Of Medications:    Risks, benefits, and possible side effects of medications explained to [facility] including risk of suicidality and serotonin syndrome related to treatment with antidepressants, risk of impaired next-day mental alertness, complex sleep-related behavior and dependence related to treatment with hypnotic medications and risks and benefits of treatment with medications in pregnancy  She verbalizes understanding and agreement for treatment      Controlled Medication Discussion: Vik Eisenberg has been filling controlled prescriptions on time as prescribed according to [address] Monitoring Program    Psychotherapy Provided:     Individual psychotherapy provided: Yes  Counseling was provided during the session today for 16 minutes  Medications, treatment progress and treatment plan reviewed with Vik Eisenberg  Medication changes discussed with Lindsey  Medication education provided to Vik Eisenberg  Importance of medication and treatment compliance reviewed with Lindsey  Educated on importance of medication and treatment compliance  Supportive therapy provided  Cognitive therapy was utilized during the session  Reassurance and supportive therapy provided  Treatment Plan:    Completed and signed during the session: Not applicable - Treatment Plan not due at this session    Note Share:     This note was not shared with the patient due to reasonable likelihood of causing patient harm    Visit start and stop times:    Start Time: 10:00 AM  Stop Time: 10:30 AM     I spent 30 minutes directly with the patient during this visit    Dennys Starr MD 10/31/22

## 2022-11-01 ENCOUNTER — SOCIAL WORK (OUTPATIENT)
Dept: BEHAVIORAL/MENTAL HEALTH CLINIC | Facility: CLINIC | Age: 32
End: 2022-11-01

## 2022-11-01 DIAGNOSIS — F41.9 ANXIETY AND DEPRESSION: Primary | ICD-10-CM

## 2022-11-01 DIAGNOSIS — F32.A ANXIETY AND DEPRESSION: Primary | ICD-10-CM
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
## 2022-11-15 ENCOUNTER — SOCIAL WORK (OUTPATIENT)
Dept: BEHAVIORAL/MENTAL HEALTH CLINIC | Facility: CLINIC | Age: 32
End: 2022-11-15

## 2022-11-15 DIAGNOSIS — F41.9 ANXIETY AND DEPRESSION: Primary | ICD-10-CM

## 2022-11-15 DIAGNOSIS — F32.A ANXIETY AND DEPRESSION: Primary | ICD-10-CM
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
## 2022-11-30 DIAGNOSIS — F41.9 ANXIETY: ICD-10-CM

## 2022-11-30 DIAGNOSIS — F32.1 CURRENT MODERATE EPISODE OF MAJOR DEPRESSIVE DISORDER, UNSPECIFIED WHETHER RECURRENT (HCC): ICD-10-CM

## 2022-12-01 RX ORDER — BUPROPION HYDROCHLORIDE 150 MG/1
150 TABLET ORAL DAILY
Qty: 30 TABLET | Refills: 1 | Status: SHIPPED | OUTPATIENT
Start: 2022-12-01 | End: 2023-01-15

## 2022-12-01 RX ORDER — DULOXETIN HYDROCHLORIDE 60 MG/1
60 CAPSULE, DELAYED RELEASE ORAL DAILY
Qty: 45 CAPSULE | Refills: 0 | Status: SHIPPED | OUTPATIENT
Start: 2022-12-01 | End: 2023-01-15

## 2022-12-20 ENCOUNTER — SOCIAL WORK (OUTPATIENT)
Dept: BEHAVIORAL/MENTAL HEALTH CLINIC | Facility: CLINIC | Age: 32
End: 2022-12-20

## 2022-12-20 DIAGNOSIS — F32.A ANXIETY AND DEPRESSION: Primary | ICD-10-CM

## 2022-12-20 DIAGNOSIS — F41.9 ANXIETY AND DEPRESSION: Primary | ICD-10-CM

## 2022-12-20 NOTE — PSYCH
Psychotherapy Provided: Individual Psychotherapy 52 minutes     Length of time in session: 52 minutes, follow up in 2 week    Encounter Diagnosis     ICD-10-CM    1  Anxiety and depression  F41 9     F32  A           Goals addressed in session: Goal 1 and Goal 2     Pain:      none    0    Current suicide risk : Low     Data: Michel apologized for her missed appointments and reported that her kids were sick and she had issues with the system, properly cancelling the appointments  Domestic Relations Dept is contacted to get some financial help for herself through child support  Person-Centered, Systemic, and DBT-based tehcniques     Assessment: Michel seemed overwhelmed and with accumulated tension from communication about important topics with significant other  She was able to identify main stressors- finances and intimate relationship  Plan: Elsa Gary will communicate her attendance issues and will call to cancel and make sure the message has gotten through  She will attend biweekly or weekly to be garett to address her concerns  Behavioral Health Treatment Plan [facility]: Diagnosis and Treatment Plan explained to Natalia Falcon relates understanding diagnosis and is agreeable to Treatment Plan   Yes     Visit start and stop times:    12/20/22  Start Time: 0455  Stop Time: 7576  Total Visit Time: 52 minutes

## 2023-01-03 ENCOUNTER — OFFICE VISIT (OUTPATIENT)
Dept: PSYCHIATRY | Facility: CLINIC | Age: 33
End: 2023-01-03

## 2023-01-03 VITALS — HEART RATE: 75 BPM | SYSTOLIC BLOOD PRESSURE: 106 MMHG | DIASTOLIC BLOOD PRESSURE: 73 MMHG

## 2023-01-03 DIAGNOSIS — F43.10 PTSD (POST-TRAUMATIC STRESS DISORDER): ICD-10-CM

## 2023-01-03 DIAGNOSIS — F32.1 CURRENT MODERATE EPISODE OF MAJOR DEPRESSIVE DISORDER, UNSPECIFIED WHETHER RECURRENT (HCC): ICD-10-CM

## 2023-01-03 DIAGNOSIS — F41.9 ANXIETY: ICD-10-CM

## 2023-01-03 RX ORDER — DULOXETIN HYDROCHLORIDE 30 MG/1
30 CAPSULE, DELAYED RELEASE ORAL DAILY
Qty: 30 CAPSULE | Refills: 1 | Status: SHIPPED | OUTPATIENT
Start: 2023-01-03 | End: 2023-03-04

## 2023-01-03 RX ORDER — DULOXETIN HYDROCHLORIDE 60 MG/1
60 CAPSULE, DELAYED RELEASE ORAL DAILY
Qty: 60 CAPSULE | Refills: 0 | Status: SHIPPED | OUTPATIENT
Start: 2023-01-03 | End: 2023-03-04

## 2023-01-03 RX ORDER — BUPROPION HYDROCHLORIDE 300 MG/1
300 TABLET ORAL EVERY MORNING
Qty: 60 TABLET | Refills: 0 | Status: SHIPPED | OUTPATIENT
Start: 2023-01-03 | End: 2023-03-04

## 2023-01-03 RX ORDER — PRAZOSIN HYDROCHLORIDE 2 MG/1
2 CAPSULE ORAL
Qty: 60 CAPSULE | Refills: 0 | Status: SHIPPED | OUTPATIENT
Start: 2023-01-03 | End: 2023-03-04

## 2023-01-03 RX ORDER — HYDROXYZINE 50 MG/1
50 TABLET, FILM COATED ORAL EVERY 8 HOURS PRN
Qty: 30 TABLET | Refills: 0 | Status: SHIPPED | OUTPATIENT
Start: 2023-01-03 | End: 2023-04-03

## 2023-01-03 NOTE — PSYCH
MEDICATION MANAGEMENT NOTE        [facility]      Name and Date of Birth:  Meghan Moeller 28 y o  1990 MRN: [id number]    Date of Visit: January 3, 2023    Reason for Visit:   Chief Complaint   Patient presents with   • Follow-up       SUBJECTIVE:    Radha Head is seen today for a follow up for Major Depressive Disorder and anxiety  She continues to do relatively well since the last visit  She reports significant depressive symptoms and significant anxiety symptoms  Patient is currently on Cymbalta 60 mg, Wellbutrin 150 mg XL,  prazosin 2 mg and hydroxyzine 25 mg as needed  She reports compliance with her medications and reports mild side effects  She reports partial response from current medications and reports residual symptoms of anxiety and depression  She feels that medications effect is tapering off slowly and she may require higher doses  She reports that hydroxyzine is helpful when she takes 1 tablet and a half and she reports improvement of her nightmares after taking prazosin  She reports having a stressful time recently dealing with her ex- and dealing with both families during holidays  Her lower limb and neuropathy pain is on and off and she recently fell on her right side and hurt her knee  She was actively doing psychotherapy and working on relationships and boundaries and dealing with her past trauma  She denies any suicidal ideation, intent or plan at present; denies any homicidal ideation, intent or plan at present  She denies any auditory hallucinations, denies any visual hallucinations, denies any delusions  She reports tiredness      HPI ROS Appetite Changes and Sleep:     She reports interrupted sleep, normal appetite, decreased energy      Review Of Systems:      Constitutional low energy   ENT negative   Cardiovascular negative   Respiratory negative   Gastrointestinal negative   Genitourinary dysmenorrhea Musculoskeletal foot pain   Integumentary negative   Neurological neuropathic pain, numbness and paresthesias   Endocrine negative   Other Symptoms none, all other systems are negative         Past Medical History:    Past Medical History:   Diagnosis Date   • Anesthesia     per pt--after ankle surgery was to be discharged and had severe headache -blurred vision -worst migraine feeling ever" and was admitted over night"   • Anxiety    • Asthma     "sports induced " not on inhalers   • Chronic pain disorder     per pt general nerve and muscle pain--sees a specialized Neurologist at [facility] Dr Ginger Lamar   • CMTD (Charcot-Rosy-Tooth disease)    • Depression    • Heart murmur    • Heavy menses     and occas just "spotting"   • History of COVID-19     twice 2020 and 2021--recovered at home   • History of transfusion    • Irregular menses    • Irritable bowel syndrome    • IUD (intrauterine device) in place    • Migraines    • Mitral valve prolapse    • Motion sickness    • Muscle weakness    • PONV (postoperative nausea and vomiting)    • Severe menstrual cramps     "at times" per pt   • Shortness of breath     per pt "with exertion and not new"   • Urinary frequency    • Vertigo         Past Surgical History:   Procedure Laterality Date   • ANKLE SURGERY Right     per pt tendon graph implanted and possible metal implant   • DILATION AND CURETTAGE OF UTERUS     • DILATION AND CURETTAGE OF UTERUS WITH HYSTEROSOCPY N/A 9/22/2022    Procedure: DILATATION AND CURETTAGE (D&C) WITH HYSTEROSCOPY;  Surgeon: Elsa Shah DO;  [facility];  Service: Gynecology   • INSERTION OF INTRAUTERINE DEVICE (IUD)     • OH HYSTEROSCOPY ENDOMETRIAL ABLATION N/A 9/22/2022    Procedure: ABLATION ENDOMETRIAL Eleanor Chaudhary;  Surgeon: Elsa Shah DO;  [facility];  Service: Gynecology   • OH REMOVAL INTRAUTERINE DEVICE IUD N/A 9/22/2022    Procedure: REMOVAL OF INTRAUTERINE DEVICE (IUD);   Surgeon: Elsa Shah DO;  [facility];  Service: Gynecology   • TUBAL LIGATION     • WISDOM TOOTH EXTRACTION       Allergies   Allergen Reactions   • Sumatriptan Other (See Comments)     Weakness, arms felt heavy       Substance Abuse History:    Social History     Substance and Sexual Activity   Alcohol Use Yes    Comment: social     Social History     Substance and Sexual Activity   Drug Use Yes   • Types: Marijuana    Comment: medical card       Social History:    Social History     Socioeconomic History   • Marital status:      Spouse name: Not on file   • Number of children: Not on file   • Years of education: Not on file   • Highest education level: Not on file   Occupational History   • Not on file   Tobacco Use   • Smoking status: Never   • Smokeless tobacco: Never   Vaping Use   • Vaping Use: Never used   Substance and Sexual Activity   • Alcohol use: Yes     Comment: social   • Drug use: Yes     Types: Marijuana     Comment: medical card   • Sexual activity: Yes     Partners: Male     Birth control/protection: Female Sterilization   Other Topics Concern   • Not on file   Social History Narrative   • Not on file     Social Determinants of Health     Financial Resource Strain: Not on file   Food Insecurity: Not on file   Transportation Needs: Not on file   Physical Activity: Not on file   Stress: Not on file   Social Connections: Not on file   Intimate Partner Violence: Not on file   Housing Stability: Not on file       Family Psychiatric History:     Family History   Problem Relation Age of Onset   • Heart disease Mother    • Other Mother         mutant gene "heart"   • Mitral valve prolapse Mother    • No Known Problems Father    • Heart disease Maternal Grandmother        History Review:  The following portions of the patient's history were reviewed and updated as appropriate: allergies, current medications, past family history, past medical history, past social history, past surgical history and problem list          OBJECTIVE:     Vital signs in last 24 hours:    Vitals:    01/03/23 1721   BP: 106/73   BP Location: Left arm   Patient Position: Sitting   Cuff Size: Standard   Pulse: 75       Mental Status Evaluation:    Appearance age appropriate, casually dressed   Behavior cooperative, calm   Speech normal rate, normal volume, normal pitch   Mood improved, anxious   Affect normal range and intensity, appropriate   Thought Processes organized, goal directed   Associations intact associations   Thought Content no overt delusions   Perceptual Disturbances: no auditory hallucinations, no visual hallucinations   Abnormal Thoughts  Risk Potential Suicidal ideation - None  Homicidal ideation - None  Potential for aggression - No   Orientation oriented to person, place, time/date and situation   Memory recent and remote memory grossly intact   Consciousness alert and awake   Attention Span Concentration Span attention span and concentration are age appropriate   Intellect appears to be of average intelligence   Insight intact   Judgement intact   Muscle Strength and  Gait decreased muscle strength, abnormal gait   Motor activity no abnormal movements   Language no difficulty naming common objects, no difficulty repeating a phrase, no difficulty writing a sentence   Fund of Knowledge adequate knowledge of current events  adequate fund of knowledge regarding past history  adequate fund of knowledge regarding vocabulary    Pain moderate   Pain Scale 5       Laboratory Results: I have personally reviewed all pertinent laboratory/tests results    Most Recent Labs:   Lab Results   Component Value Date    WBC 7 00 06/08/2022    RBC 4 57 06/08/2022    HGB 13 4 06/08/2022    HCT 39 2 06/08/2022     06/08/2022    RDW 13 3 06/08/2022    NEUTROABS 4 80 06/08/2022     02/23/2015    K 3 6 06/08/2022     (H) 06/08/2022    CO2 24 06/08/2022    BUN 8 06/08/2022    CREATININE 0 55 (L) 06/08/2022    GLUCOSE 66 02/23/2015 CALCIUM 9 0 06/08/2022    AST 11 06/08/2022    ALT 18 06/08/2022    ALKPHOS 41 (L) 06/08/2022    PROT 7 7 02/23/2015    BILITOT 0 4 02/23/2015    CHOLESTEROL 209 (H) 08/24/2022    TRIG 249 (H) 08/24/2022    HDL 35 (L) 08/24/2022    LDLCALC 124 (H) 08/24/2022    [place] 174 08/24/2022    PREGSERUM Negative 06/08/2022    HCGQUANT <2 05/31/2021    RPR Non-Reactive 12/27/2021       Suicide/Homicide Risk Assessment:    Risk of Harm to Self:  The following ratings are based on assessment at the time of the interview  Based on today's assessment, Carmelo Hernandez presents the following risk of harm to self: minimal    Risk of Harm to Others: The following ratings are based on assessment at the time of the interview  Based on today's Elfego Mae presents the following risk of harm to others: minimal    The following interventions are recommended: no intervention changes needed    Assessment/Plan:  Akash Gonzalez is a 46years old adult female patient was seen today in the office for follow-up  His last visits started patient on Cymbalta 30 milligram and prazosin 1 milligram at bedtime help with her anxiety, depression and PTSD symptoms  Patient showed partial response and was increased to Cymbalta 60 mg and prazosin 2 mg and symptoms continues to improve however she complained from sexual side effects from the medication  In the last visit we added Wellbutrin to Cymbalta to help with fatigue and decreased sexual side effects and seems working partially  Today we were going to increase her Cymbalta up to 90 mg and increase Wellbutrin up to 300 mg  Also will increase hydroxyzine to 50 mg and keep prazosin 2 mg at bedtime  1  PTSD (post-traumatic stress disorder)  - prazosin (MINIPRESS) 2 mg capsule; Take 1 capsule (2 mg total) by mouth daily at bedtime  Dispense: 60 capsule; Refill: 0    2   Current moderate episode of major depressive disorder, unspecified whether recurrent (HCC)  - DULoxetine (CYMBALTA) 60 mg delayed release capsule; Take 1 capsule (60 mg total) by mouth daily  Dispense: 60 capsule; Refill: 0  - buPROPion (Wellbutrin XL) 300 mg 24 hr tablet; Take 1 tablet (300 mg total) by mouth every morning  Dispense: 60 tablet; Refill: 0  - DULoxetine (Cymbalta) 30 mg delayed release capsule; Take 1 capsule (30 mg total) by mouth daily Take with 60 mg, total daily dose 90 mg  Dispense: 30 capsule; Refill: 1    3  Anxiety  - DULoxetine (CYMBALTA) 60 mg delayed release capsule; Take 1 capsule (60 mg total) by mouth daily  Dispense: 60 capsule; Refill: 0  - hydrOXYzine HCL (ATARAX) 50 mg tablet; Take 1 tablet (50 mg total) by mouth every 8 (eight) hours as needed for anxiety  Dispense: 30 tablet; Refill: 0    Treatment Recommendations/Precautions:    Increase Cymbalta 90 mg daily to improve depressive symptoms  increase Wellbutrin to 300 mg XL for sexual side effects   Continue Prazosin 2 mg at bedtime to help with PTSD symptoms  Increase Hydroxyzine 50 mg four times a day as needed to improve anxiety symptoms  Medication management every 4 weeks  Continue psychotherapy with [facility] therapist Danna Denise  Aware of 24 hour and weekend coverage for urgent situations accessed by calling [facility] main practice number    Medications Risks/Benefits      Risks, Benefits And Possible Side Effects Of Medications:    Risks, benefits, and possible side effects of medications explained to [facility] including risk of suicidality and serotonin syndrome related to treatment with antidepressants, risk of impaired next-day mental alertness, complex sleep-related behavior and dependence related to treatment with hypnotic medications and risks and benefits of treatment with medications in pregnancy  She verbalizes understanding and agreement for treatment      Controlled Medication Discussion:     [facility] has been filling controlled prescriptions on time as prescribed according to [place] Prescription Drug Monitoring Program    Psychotherapy Provided:     Individual psychotherapy provided: Yes  Counseling was provided during the session today for 16 minutes  Medications, treatment progress and treatment plan reviewed with [facility]  Medication changes discussed with Lindsey  Medication education provided to [facility]  Importance of medication and treatment compliance reviewed with Lindsey  Educated on importance of medication and treatment compliance  Supportive therapy provided  Cognitive therapy was utilized during the session  Reassurance and supportive therapy provided  Treatment Plan:    Completed and signed during the session: Not applicable - Treatment Plan not due at this session    Note Share:     This note was not shared with the patient due to reasonable likelihood of causing patient harm    Visit start and stop times:    Start Time: 5:00 PM  Stop Time: 05:30 PM     I spent 30 minutes directly with the patient during this visit    Beatriz Watson MD 01/03/23

## 2023-01-04 ENCOUNTER — HOSPITAL ENCOUNTER (OUTPATIENT)
Dept: RADIOLOGY | Facility: HOSPITAL | Age: 33
Discharge: HOME | End: 2023-01-04

## 2023-01-04 ENCOUNTER — OFFICE VISIT (OUTPATIENT)
Dept: OBGYN CLINIC | Facility: CLINIC | Age: 33
End: 2023-01-04

## 2023-01-04 VITALS
SYSTOLIC BLOOD PRESSURE: 123 MMHG | DIASTOLIC BLOOD PRESSURE: 90 MMHG | HEIGHT: 63 IN | BODY MASS INDEX: 39.45 KG/M2 | HEART RATE: 93 BPM | OXYGEN SATURATION: 97 %

## 2023-01-04 DIAGNOSIS — M25.361 PATELLAR INSTABILITY OF RIGHT KNEE: ICD-10-CM

## 2023-01-04 DIAGNOSIS — M25.561 RIGHT KNEE PAIN, UNSPECIFIED CHRONICITY: ICD-10-CM

## 2023-01-04 DIAGNOSIS — M25.561 RIGHT KNEE PAIN, UNSPECIFIED CHRONICITY: Primary | ICD-10-CM

## 2023-01-04 NOTE — PROGRESS NOTES
Assessment/Plan   Diagnoses and all orders for this visit:    Right knee pain, acute on chronic    Right knee, patellar instability    - J Brace fitted and dispensed  - Restart PT   - I held off on an MRI because the pt states she has already had "many" of this knee  She will get a copy of the most recent one   - Ice as needed  - Crutches until walking without a limp  - Follow up with Dr Carlos Alberto Montgomery            Subjective   Patient ID: Zack Caldera is a 28 y o  female  Vitals:    01/04/23 1539   BP: 123/90   Pulse: 93   SpO2: 97%     33yo female comes in for an evaluation of her right knee  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  When it dislocates, her Mom reduces it for her  She states she averages an episode of patellar instability once every 2-3 months  Yesterday, she stepped awkwardly and her patella dislocated  When she fell, she struck it on the ground and that reduced it  The pain is dull in character, moderate in severity, pain does not radiate and is not associated with numbness  Because of covid and then a divorce, she held off on seeing orthopedics, but now things are stable and she states she wants to consider surgery if there is one that is recommended  She used to see Dr Dowd [facility] and states she has had "many" right knee MRIs and multiple courses of PT          The following portions of the patient's history were reviewed and updated as appropriate: allergies, current medications, past family history, past medical history, past social history, past surgical history and problem list     Review of Systems  Ortho Exam  Past Medical History:   Diagnosis Date   • Anesthesia     per pt--after ankle surgery was to be discharged and had severe headache -blurred vision -worst migraine feeling ever" and was admitted over night"   • Anxiety    • Asthma     "sports induced " not on inhalers   • Chronic pain disorder per pt general nerve and muscle pain--sees a specialized Neurologist at [facility] Dr Sara Hsu   • CMTD (Charcot-Rosy-Tooth disease)    • Depression    • Heart murmur    • Heavy menses     and occas just "spotting"   • History of COVID-19     twice 2020 and 2021--recovered at home   • History of transfusion    • Irregular menses    • Irritable bowel syndrome    • IUD (intrauterine device) in place    • Migraines    • Mitral valve prolapse    • Motion sickness    • Muscle weakness    • PONV (postoperative nausea and vomiting)    • Severe menstrual cramps     "at times" per pt   • Shortness of breath     per pt "with exertion and not new"   • Urinary frequency    • Vertigo      Past Surgical History:   Procedure Laterality Date   • ANKLE SURGERY Right     per pt tendon graph implanted and possible metal implant   • DILATION AND CURETTAGE OF UTERUS     • DILATION AND CURETTAGE OF UTERUS WITH HYSTEROSOCPY N/A 9/22/2022    Procedure: DILATATION AND CURETTAGE (D&C) WITH HYSTEROSCOPY;  Surgeon: Johanny Nina DO;  [facility];  Service: Gynecology   • INSERTION OF INTRAUTERINE DEVICE (IUD)     • DE HYSTEROSCOPY ENDOMETRIAL ABLATION N/A 9/22/2022    Procedure: ABLATION ENDOMETRIAL Ellouise Setters;  Surgeon: Johanny Nina DO;  [facility];  Service: Gynecology   • DE REMOVAL INTRAUTERINE DEVICE IUD N/A 9/22/2022    Procedure: REMOVAL OF INTRAUTERINE DEVICE (IUD);   Surgeon: Johanny Nina DO;  [facility];  Service: Gynecology   • TUBAL LIGATION     • WISDOM TOOTH EXTRACTION       Family History   Problem Relation Age of Onset   • Heart disease Mother    • Other Mother         mutant gene "heart"   • Mitral valve prolapse Mother    • No Known Problems Father    • Heart disease Maternal Grandmother      Social History     Occupational History   • Not on file   Tobacco Use   • Smoking status: Never   • Smokeless tobacco: Never   Vaping Use   • Vaping Use: Never used   Substance and Sexual Activity   • Alcohol use: Yes     Comment: social   • Drug use: Yes     Types: Marijuana     Comment: medical card   • Sexual activity: Yes     Partners: Male     Birth control/protection: Female Sterilization       Review of Systems   Constitutional: Negative  HENT: Negative  Eyes: Negative  Respiratory: Negative  Cardiovascular: Negative  Gastrointestinal: Negative  Endocrine: Negative  Genitourinary: Negative  Musculoskeletal: As below      Allergic/Immunologic: Negative  Neurological: Negative  Hematological: Negative  Psychiatric/Behavioral: Negative  Objective   Physical Exam    · Constitutional: Awake, Alert, Oriented  · Eyes: EOMI  · Psych: Mood and affect appropriate  · Heart: regular rate   · Lungs: No audible wheezing  · Abdomen: No guarding  · Lymph: no lymphedema  • right Knee:  - Appearance  • Swelling: mild, no discoloration, no deformity, no ecchymosis and no erythema  - Effusion  • mild  - Palpation  • + Tenderness medial joint line , + Tenderness lateral joint line , + Tenderness patellar tendon and + tenderness of the lateral > medial patellar border  - ROM  • Extension: 10 and Flexion: 90  - Special Tests  • + patellar apprehension  • Anterior Drawer Test negative, Posterior Drawer Test negative, Valgus Stress Test negative and Varus Stress Test negative  - Motor  • normal 5/5 in all planes  - NVI distally    I have personally reviewed pertinent films in PACS and my interpretation is No acute displaced fracture on xray

## 2023-01-10 ENCOUNTER — SOCIAL WORK (OUTPATIENT)
Dept: BEHAVIORAL/MENTAL HEALTH CLINIC | Facility: CLINIC | Age: 33
End: 2023-01-10

## 2023-01-10 DIAGNOSIS — F32.A ANXIETY AND DEPRESSION: Primary | ICD-10-CM

## 2023-01-10 DIAGNOSIS — F41.9 ANXIETY AND DEPRESSION: Primary | ICD-10-CM

## 2023-01-20 ENCOUNTER — TELEPHONE (OUTPATIENT)
Dept: NEUROLOGY | Facility: CLINIC | Age: 33
End: 2023-01-20

## 2023-01-20 NOTE — TELEPHONE ENCOUNTER
LMOM for pt to confirm appt with Tacey Pradeep on 2/3 in [facility]

## 2023-01-24 ENCOUNTER — SOCIAL WORK (OUTPATIENT)
Dept: BEHAVIORAL/MENTAL HEALTH CLINIC | Facility: CLINIC | Age: 33
End: 2023-01-24

## 2023-01-24 DIAGNOSIS — F41.9 ANXIETY AND DEPRESSION: Primary | ICD-10-CM

## 2023-01-24 DIAGNOSIS — F32.A ANXIETY AND DEPRESSION: Primary | ICD-10-CM

## 2023-01-24 NOTE — PSYCH
Behavioral Health Psychotherapy Progress Note    Psychotherapy Provided: Individual Psychotherapy     1  Anxiety and depression            Goals addressed in session: Goal 1 and Goal 2     DATA: Michel discussed her current relationship and the direction she is leaning towards- giving him a 6-month period to think over and decide to movie on or make changes to current triggers that are unacceptible for her  During this session, this clinician used the following therapeutic modalities: Client-centered Therapy, Cognitive Behavioral Therapy, Cognitive Processing Therapy, Dialectical Behavior Therapy, Existential Therapy, Mindfulness-based Strategies and Supportive Psychotherapy    Substance Abuse was not addressed during this session  If the client is diagnosed with a co-occurring substance use disorder, please indicate any changes in the frequency or amount of use: NA  Stage of change for addressing substance use diagnoses: No substance use/Not applicable    ASSESSMENT:  Aisha Milligan presents with a Euthymic/ normal and Anxious mood  her affect is Normal range and intensity, which is congruent, with her mood and the content of the session  The client has made progress on their goals  Michel was very form and clear about her boundaries and had valid concerns and relevant thought content  Michel appears more accepting than before of another scenario following her relationship decision and development  She was more flexible and contemplating a different conclusion  Aisha Milligan presents with a none risk of suicide, none risk of self-harm, and none risk of harm to others  For any risk assessment that surpasses a "low" rating, a safety plan must be developed  A safety plan was indicated: no  If yes, describe in detail NA    PLAN: Between sessions, Aisha Milligan will use Mindfulness and more validation and reflection when communicating with significant other   She will go over non-violent communication materials and the exercise recommended for her and her significant other and will discuss the results in the sessions  At the next session, the therapist will use Client-centered Therapy, Cognitive Behavioral Therapy, Cognitive Processing Therapy, Dialectical Behavior Therapy, Existential Therapy, Mindfulness-based Strategies and Supportive Psychotherapy to address attachment issues, past negative learning and impact on her current communication and relationship as well as unmet needs  Behavioral Health Treatment Plan and Discharge Planning: Larry Weaver is aware of and agrees to continue to work on their treatment plan  They have identified and are working toward their discharge goals   yes    Visit start and stop times:    01/24/23  Start Time: 0607  Stop Time: 3999  Total Visit Time: 50 minutes

## 2023-01-27 ENCOUNTER — TELEPHONE (OUTPATIENT)
Dept: NEUROLOGY | Facility: CLINIC | Age: 33
End: 2023-01-27

## 2023-02-28 ENCOUNTER — DOCUMENTATION (OUTPATIENT)
Dept: BEHAVIORAL/MENTAL HEALTH CLINIC | Facility: CLINIC | Age: 33
End: 2023-02-28

## 2023-03-07 ENCOUNTER — OFFICE VISIT (OUTPATIENT)
Dept: PSYCHIATRY | Facility: CLINIC | Age: 33
End: 2023-03-07

## 2023-03-07 VITALS — DIASTOLIC BLOOD PRESSURE: 86 MMHG | HEART RATE: 64 BPM | SYSTOLIC BLOOD PRESSURE: 118 MMHG

## 2023-03-07 DIAGNOSIS — F43.10 PTSD (POST-TRAUMATIC STRESS DISORDER): Primary | ICD-10-CM

## 2023-03-07 DIAGNOSIS — F32.1 CURRENT MODERATE EPISODE OF MAJOR DEPRESSIVE DISORDER, UNSPECIFIED WHETHER RECURRENT (HCC): ICD-10-CM

## 2023-03-07 DIAGNOSIS — F41.9 ANXIETY: ICD-10-CM

## 2023-03-07 DIAGNOSIS — G47.00 INSOMNIA, UNSPECIFIED TYPE: ICD-10-CM

## 2023-03-07 RX ORDER — BUPROPION HYDROCHLORIDE 300 MG/1
300 TABLET ORAL EVERY MORNING
Qty: 30 TABLET | Refills: 2 | Status: SHIPPED | OUTPATIENT
Start: 2023-03-07 | End: 2023-04-06

## 2023-03-07 RX ORDER — HYDROXYZINE PAMOATE 50 MG/1
50 CAPSULE ORAL 2 TIMES DAILY
Qty: 60 CAPSULE | Refills: 2 | Status: SHIPPED | OUTPATIENT
Start: 2023-03-07 | End: 2023-06-05

## 2023-03-07 RX ORDER — BUPROPION HYDROCHLORIDE 300 MG/1
300 TABLET ORAL EVERY MORNING
Qty: 60 TABLET | Refills: 0 | Status: CANCELLED | OUTPATIENT
Start: 2023-03-07 | End: 2023-05-06

## 2023-03-07 RX ORDER — DULOXETIN HYDROCHLORIDE 60 MG/1
120 CAPSULE, DELAYED RELEASE ORAL DAILY
Qty: 60 CAPSULE | Refills: 2 | Status: SHIPPED | OUTPATIENT
Start: 2023-03-07 | End: 2023-04-06

## 2023-03-07 NOTE — PSYCH
MEDICATION MANAGEMENT NOTE        [address]      Name and Date of Birth:  Carmelo Oneill 28 y o  1990 MRN: [id number]    Date of Visit: March 9, 2023    Reason for Visit:   Chief Complaint   Patient presents with   • Follow-up       SUBJECTIVE:    Bob Hernandez is seen today for a follow up for Major Depressive Disorder and anxiety  She continues to do relatively well since the last visit  She reports significant depressive symptoms and significant anxiety symptoms  Patient is currently on Cymbalta 90 mg, Wellbutrin 300 mg XL,  prazosin 2 mg and hydroxyzine 50 mg as needed  She stopped taking prazosin as her sleep is fine and she feels tired after taking the medication in the morning she reports compliance with her medications and reports mild side effects  She reports partial response from current medications and reports residual symptoms of anxiety and depression  She reports that hydroxyzine is helpful especially after increasing dose from 25-50   Patient reported losing her job recently and she lost her private insurance coverage  She applied for medical assistance and waiting for the response  She reported that her old job was toxic in nature and she did not feel happy  She is currently working for [employer] and has no benefits  she was actively doing psychotherapy and working on relationships and boundaries and dealing with her past trauma  She denies any suicidal ideation, intent or plan at present; denies any homicidal ideation, intent or plan at present  She denies any auditory hallucinations, denies any visual hallucinations, denies any delusions  She reports tiredness      HPI ROS Appetite Changes and Sleep:     She reports interrupted sleep, normal appetite, decreased energy      Review Of Systems:      Constitutional low energy   ENT negative   Cardiovascular negative   Respiratory negative   Gastrointestinal negative Genitourinary dysmenorrhea   Musculoskeletal foot pain   Integumentary negative   Neurological neuropathic pain, numbness and paresthesias   Endocrine negative   Other Symptoms none, all other systems are negative         Past Medical History:    Past Medical History:   Diagnosis Date   • Anesthesia     per pt--after ankle surgery was to be discharged and had severe headache -blurred vision -worst migraine feeling ever" and was admitted over night"   • Anxiety    • Asthma     "sports induced " not on inhalers   • Chronic pain disorder     per pt general nerve and muscle pain--sees a specialized Neurologist at [facility] Dr Niles Clark   • CMTD (Charcot-Rosy-Tooth disease)    • Depression    • Heart murmur    • Heavy menses     and occas just "spotting"   • History of COVID-19     twice 2020 and 2021--recovered at home   • History of transfusion    • Irregular menses    • Irritable bowel syndrome    • IUD (intrauterine device) in place    • Migraines    • Mitral valve prolapse    • Motion sickness    • Muscle weakness    • PONV (postoperative nausea and vomiting)    • Severe menstrual cramps     "at times" per pt   • Shortness of breath     per pt "with exertion and not new"   • Urinary frequency    • Vertigo         Past Surgical History:   Procedure Laterality Date   • ANKLE SURGERY Right     per pt tendon graph implanted and possible metal implant   • DILATION AND CURETTAGE OF UTERUS     • DILATION AND CURETTAGE OF UTERUS WITH HYSTEROSOCPY N/A 9/22/2022    Procedure: DILATATION AND CURETTAGE (D&C) WITH HYSTEROSCOPY;  Surgeon: Avery Lockwood DO;  Location: [facility];  Service: Gynecology   • INSERTION OF INTRAUTERINE DEVICE (IUD)     • KS HYSTEROSCOPY ENDOMETRIAL ABLATION N/A 9/22/2022    Procedure: ABLATION ENDOMETRIAL Izzy Webber;  Surgeon: Avery Lockwood DO;  Location[facility];  Service: Gynecology   • KS REMOVAL INTRAUTERINE DEVICE IUD N/A 9/22/2022    Procedure: REMOVAL OF INTRAUTERINE DEVICE (IUD); Surgeon: Rosi Major DO;  Location[facility];  Service: Gynecology   • TUBAL LIGATION     • WISDOM TOOTH EXTRACTION       Allergies   Allergen Reactions   • Sumatriptan Other (See Comments)     Weakness, arms felt heavy       Substance Abuse History:    Social History     Substance and Sexual Activity   Alcohol Use Yes    Comment: social     Social History     Substance and Sexual Activity   Drug Use Yes   • Types: Marijuana    Comment: medical card       Social History:    Social History     Socioeconomic History   • Marital status:      Spouse name: Not on file   • Number of children: Not on file   • Years of education: Not on file   • Highest education level: Not on file   Occupational History   • Not on file   Tobacco Use   • Smoking status: Never   • Smokeless tobacco: Never   Vaping Use   • Vaping Use: Never used   Substance and Sexual Activity   • Alcohol use: Yes     Comment: social   • Drug use: Yes     Types: Marijuana     Comment: medical card   • Sexual activity: Yes     Partners: Male     Birth control/protection: Female Sterilization   Other Topics Concern   • Not on file   Social History Narrative   • Not on file     Social Determinants of Health     Financial Resource Strain: Not on file   Food Insecurity: Not on file   Transportation Needs: Not on file   Physical Activity: Not on file   Stress: Not on file   Social Connections: Not on file   Intimate Partner Violence: Not on file   Housing Stability: Not on file       Family Psychiatric History:     Family History   Problem Relation Age of Onset   • Heart disease Mother    • Other Mother         mutant gene "heart"   • Mitral valve prolapse Mother    • No Known Problems Father    • Heart disease Maternal Grandmother        History Review:  The following portions of the patient's history were reviewed and updated as appropriate: allergies, current medications, past family history, past medical history, past social history, past surgical history and problem list          OBJECTIVE:     Vital signs in last 24 hours:    Vitals:    03/07/23 1735   BP: 118/86   BP Location: Right arm   Patient Position: Sitting   Cuff Size: Standard   Pulse: 64       Mental Status Evaluation:    Appearance age appropriate, casually dressed   Behavior cooperative, calm   Speech normal rate, normal volume, normal pitch   Mood improved, anxious   Affect normal range and intensity, appropriate   Thought Processes organized, goal directed   Associations intact associations   Thought Content no overt delusions   Perceptual Disturbances: no auditory hallucinations, no visual hallucinations   Abnormal Thoughts  Risk Potential Suicidal ideation - None  Homicidal ideation - None  Potential for aggression - No   Orientation oriented to person, place, time/date and situation   Memory recent and remote memory grossly intact   Consciousness alert and awake   Attention Span Concentration Span attention span and concentration are age appropriate   Intellect appears to be of average intelligence   Insight intact   Judgement intact   Muscle Strength and  Gait decreased muscle strength, abnormal gait   Motor activity no abnormal movements   Language no difficulty naming common objects, no difficulty repeating a phrase, no difficulty writing a sentence   Fund of Knowledge adequate knowledge of current events  adequate fund of knowledge regarding past history  adequate fund of knowledge regarding vocabulary    Pain moderate   Pain Scale 5       Laboratory Results: I have personally reviewed all pertinent laboratory/tests results    Most Recent Labs:   Lab Results   Component Value Date    WBC 7 00 06/08/2022    RBC 4 57 06/08/2022    HGB 13 4 06/08/2022    HCT 39 2 06/08/2022     06/08/2022    RDW 13 3 06/08/2022    NEUTROABS 4 80 06/08/2022     02/23/2015    K 3 6 06/08/2022     (H) 06/08/2022    CO2 24 06/08/2022    BUN 8 06/08/2022    CREATININE 0 55 (L) 06/08/2022    GLUCOSE 66 02/23/2015    CALCIUM 9 0 06/08/2022    AST 11 06/08/2022    ALT 18 06/08/2022    ALKPHOS 41 (L) 06/08/2022    PROT 7 7 02/23/2015    BILITOT 0 4 02/23/2015    CHOLESTEROL 209 (H) 08/24/2022    TRIG 249 (H) 08/24/2022    HDL 35 (L) 08/24/2022    LDLCALC 124 (H) 08/24/2022    [place] 174 08/24/2022    PREGSERUM Negative 06/08/2022    HCGQUANT <2 05/31/2021    RPR Non-Reactive 12/27/2021       Suicide/Homicide Risk Assessment:    Risk of Harm to Self:  The following ratings are based on assessment at the time of the interview  Based on today's assessment, Inis Aid presents the following risk of harm to self: minimal    Risk of Harm to Others: The following ratings are based on assessment at the time of the interview  Based on today's Judyteodoro Acevedo presents the following risk of harm to others: minimal    The following interventions are recommended: no intervention changes needed    Assessment/Plan:  Anthony Bowens is a 46years old adult female patient was seen today in the office for follow-up  He is a previous visits started her on Cymbalta and added Wellbutrin to help with sexual side effects and lack of energy and motivation  We will maximize Cymbalta dosage to be on 120 mg and we will continue Wellbutrin at 300 mg once a day  Use of prazosin before for nightmares however it caused side effects and was discontinued  We will continue hydroxyzine for anxiety which has been helpful  Encouraged patient to continue psychotherapy and counseling for her trauma  Provided patient with [employer] discount coupons for her medication     1  Current moderate episode of major depressive disorder, unspecified whether recurrent (HCC)  Continue psychotherapy  - DULoxetine (CYMBALTA) 60 mg delayed release capsule; Take 2 capsules (120 mg total) by mouth daily  Dispense: 60 capsule; Refill: 2  - buPROPion (Wellbutrin XL) 300 mg 24 hr tablet;  Take 1 tablet (300 mg total) by mouth every morning Dispense: 30 tablet; Refill: 2  - hydrOXYzine pamoate (VISTARIL) 50 mg capsule; Take 1 capsule (50 mg total) by mouth 2 (two) times a day  Dispense: 60 capsule; Refill: 2    2  Anxiety  Current continue psychotherapy  - DULoxetine (CYMBALTA) 60 mg delayed release capsule; Take 2 capsules (120 mg total) by mouth daily  Dispense: 60 capsule; Refill: 2  - hydrOXYzine pamoate (VISTARIL) 50 mg capsule; Take 1 capsule (50 mg total) by mouth 2 (two) times a day  Dispense: 60 capsule; Refill: 2    3  PTSD (post-traumatic stress disorder)  Continue psychotherapy  - hydrOXYzine pamoate (VISTARIL) 50 mg capsule; Take 1 capsule (50 mg total) by mouth 2 (two) times a day  Dispense: 60 capsule; Refill: 2    4  Insomnia, unspecified type  - hydrOXYzine pamoate (VISTARIL) 50 mg capsule; Take 1 capsule (50 mg total) by mouth 2 (two) times a day  Dispense: 60 capsule; Refill: 2    Treatment Recommendations/Precautions:    Increase Cymbalta 120 mg daily to improve depressive symptoms  Continue Wellbutrin to 300 mg XL for sexual side effects   Discontinue Prazosin 2 mg at bedtime   Continue Hydroxyzine 50 mg four times a day as needed to improve anxiety symptoms  Medication management every 4 weeks  Continue psychotherapy with [facility] therapist Michael Arellano  Aware of 24 hour and weekend coverage for urgent situations accessed by calling [facility] main practice number    Medications Risks/Benefits      Risks, Benefits And Possible Side Effects Of Medications:    Risks, benefits, and possible side effects of medications explained to [facility] including risk of suicidality and serotonin syndrome related to treatment with antidepressants, risk of impaired next-day mental alertness, complex sleep-related behavior and dependence related to treatment with hypnotic medications and risks and benefits of treatment with medications in pregnancy  She verbalizes understanding and agreement for treatment      Controlled Medication Discussion:     Ailyn Montgomery has been filling controlled prescriptions on time as prescribed according to [address] [place] [address] [email or web address] Monitoring Program    Psychotherapy Provided:     Individual psychotherapy provided: Yes  Counseling was provided during the session today for 16 minutes  Medications, treatment progress and treatment plan reviewed with Ailyn Montgomery  Medication changes discussed with Lindsey  Medication education provided to Ailyn Montgomery  Importance of medication and treatment compliance reviewed with Lindsey  Educated on importance of medication and treatment compliance  Supportive therapy provided  Cognitive therapy was utilized during the session  Reassurance and supportive therapy provided  Treatment Plan:    Completed and signed during the session: Not applicable - Treatment Plan not due at this session    Note Share:     This note was not shared with the patient due to reasonable likelihood of causing patient harm    Visit start and stop times:    Start Time: 5:00 PM  Stop Time: 05:30 PM     I spent 30 minutes directly with the patient during this visit    Angie Hannon MD 03/09/23

## 2023-03-08 ENCOUNTER — TELEPHONE (OUTPATIENT)
Dept: BEHAVIORAL/MENTAL HEALTH CLINIC | Facility: CLINIC | Age: 33
End: 2023-03-08

## 2023-03-08 NOTE — TELEPHONE ENCOUNTER
Telephone call to PT following up on insurance application  PT was able to apply for medical assistance but is awaiting confirmation of approval      PT will contact office back once insurance is secured

## 2023-03-17 ENCOUNTER — TELEPHONE (OUTPATIENT)
Dept: PSYCHIATRY | Facility: CLINIC | Age: 33
End: 2023-03-17

## 2023-03-17 NOTE — TELEPHONE ENCOUNTER
DISCHARGE LETTER for Linda Pedersen [facility] (certified and regular) placed in outgoing mail on 03/17/23      Article #:  [id number]  Address:  [address] [place] [address]

## 2023-05-11 ENCOUNTER — TELEPHONE (OUTPATIENT)
Dept: PSYCHIATRY | Facility: CLINIC | Age: 33
End: 2023-05-11

## 2023-05-11 DIAGNOSIS — G47.00 INSOMNIA, UNSPECIFIED TYPE: ICD-10-CM

## 2023-05-11 DIAGNOSIS — F43.10 PTSD (POST-TRAUMATIC STRESS DISORDER): ICD-10-CM

## 2023-05-11 DIAGNOSIS — F41.9 ANXIETY: ICD-10-CM

## 2023-05-11 DIAGNOSIS — F32.1 CURRENT MODERATE EPISODE OF MAJOR DEPRESSIVE DISORDER, UNSPECIFIED WHETHER RECURRENT (HCC): ICD-10-CM

## 2023-05-11 NOTE — TELEPHONE ENCOUNTER
Patient called and left voicemail, she is having issues with her mood, mood is all over the place, she did state that her mom his bi-polar  Patient had a break down yesterday where she had to call her mom and her mom stay with her  She doesn't want to get out of bed, she doesn't want to be around people and she is not normally like that, she is a people person  Patient wants to know what she can do  She would like a call back  [de-identified] : possible parotid lymph node enlargment [de-identified] : neuropathy labs notable for crp 0.83, esr 50\par glu 111, HbA1C 6.8\par Vit B3 high

## 2023-05-12 ENCOUNTER — TELEPHONE (OUTPATIENT)
Dept: PSYCHOLOGY | Facility: CLINIC | Age: 33
End: 2023-05-12

## 2023-05-12 RX ORDER — BUPROPION HYDROCHLORIDE 200 MG/1
200 TABLET, EXTENDED RELEASE ORAL
Qty: 60 TABLET | Refills: 0 | Status: SHIPPED | OUTPATIENT
Start: 2023-05-12 | End: 2023-06-11

## 2023-05-12 RX ORDER — QUETIAPINE FUMARATE 50 MG/1
50 TABLET, EXTENDED RELEASE ORAL
Qty: 30 TABLET | Refills: 0 | Status: SHIPPED | OUTPATIENT
Start: 2023-05-12 | End: 2023-06-11

## 2023-05-12 RX ORDER — DULOXETIN HYDROCHLORIDE 60 MG/1
120 CAPSULE, DELAYED RELEASE ORAL DAILY
Qty: 60 CAPSULE | Refills: 0 | Status: SHIPPED | OUTPATIENT
Start: 2023-05-12 | End: 2023-06-11

## 2023-05-12 RX ORDER — HYDROXYZINE PAMOATE 50 MG/1
50 CAPSULE ORAL 2 TIMES DAILY
Qty: 60 CAPSULE | Refills: 2 | Status: SHIPPED | OUTPATIENT
Start: 2023-05-12 | End: 2023-08-10

## 2023-05-12 NOTE — TELEPHONE ENCOUNTER
Returned Selvin's  LM on her VM with nursing number so that she can call me back for further discussion

## 2023-05-12 NOTE — TELEPHONE ENCOUNTER
Michel returned my call  She is really struggling  Feels lost after leaving her job but couldn't stay due to problems with co-worker  Having problems with daughter and she just don't know what to do  Stopped seeing therapist but does talk to her   Also talking to her mom who is bipolar  States she has a lot of mental illness in her family, bipolar, schizophrenia, and borderline personality disorder  She will be going to a bipolar support group with her mom on Wednesdays  She doesn't want to get out of bed, doesn't want to be around people and that is not like her  She is asking about different medications  States her mother is on a mood stabilizer and said it helps her  She would like to discuss this with you  Informed her I would have the office call her to schedule an appointment with provider ASAP  Will refer to Dr Denotn Lucas for review

## 2023-05-12 NOTE — TELEPHONE ENCOUNTER
Spoke the patient over the about recent symptoms  She is going through crisis in her life after having difficulties with her daughter and work stressors  Discussed with her utilizing more tools of therapy like referral to a partial program and ending a mood stabilizer to help with her depression  We will add Seroquel at bedtime 50 mg XR and titrate according to her response and will also increase Wellbutrin from 300 mg XR to 20 mg SR twice a day  We will continue Cymbalta and hydroxyzine as previous doses

## 2023-06-06 ENCOUNTER — OFFICE VISIT (OUTPATIENT)
Dept: PSYCHIATRY | Facility: CLINIC | Age: 33
End: 2023-06-06

## 2023-06-06 VITALS — SYSTOLIC BLOOD PRESSURE: 122 MMHG | DIASTOLIC BLOOD PRESSURE: 83 MMHG | HEART RATE: 67 BPM

## 2023-06-06 DIAGNOSIS — F32.1 CURRENT MODERATE EPISODE OF MAJOR DEPRESSIVE DISORDER, UNSPECIFIED WHETHER RECURRENT (HCC): ICD-10-CM

## 2023-06-06 DIAGNOSIS — F41.9 ANXIETY: ICD-10-CM

## 2023-06-06 DIAGNOSIS — G47.00 INSOMNIA, UNSPECIFIED TYPE: ICD-10-CM

## 2023-06-06 DIAGNOSIS — T73.3XXA FATIGUE DUE TO EXCESSIVE EXERTION, INITIAL ENCOUNTER: ICD-10-CM

## 2023-06-06 DIAGNOSIS — E55.9 VITAMIN D DEFICIENCY: ICD-10-CM

## 2023-06-06 DIAGNOSIS — Z79.899 LONG TERM CURRENT USE OF ANTIPSYCHOTIC MEDICATION: Primary | ICD-10-CM

## 2023-06-06 DIAGNOSIS — F43.10 PTSD (POST-TRAUMATIC STRESS DISORDER): ICD-10-CM

## 2023-06-06 PROCEDURE — 90833 PSYTX W PT W E/M 30 MIN: CPT | Performed by: STUDENT IN AN ORGANIZED HEALTH CARE EDUCATION/TRAINING PROGRAM

## 2023-06-06 PROCEDURE — 99214 OFFICE O/P EST MOD 30 MIN: CPT | Performed by: STUDENT IN AN ORGANIZED HEALTH CARE EDUCATION/TRAINING PROGRAM

## 2023-06-06 RX ORDER — BUPROPION HYDROCHLORIDE 450 MG/1
450 TABLET, FILM COATED, EXTENDED RELEASE ORAL DAILY
Qty: 30 TABLET | Refills: 0 | Status: SHIPPED | OUTPATIENT
Start: 2023-06-06 | End: 2023-07-06

## 2023-06-06 RX ORDER — PROPRANOLOL HYDROCHLORIDE 10 MG/1
10 TABLET ORAL 3 TIMES DAILY
Qty: 90 TABLET | Refills: 0 | Status: SHIPPED | OUTPATIENT
Start: 2023-06-06 | End: 2023-07-06

## 2023-06-06 RX ORDER — ARIPIPRAZOLE 5 MG/1
5 TABLET ORAL DAILY
Qty: 30 TABLET | Refills: 0 | Status: SHIPPED | OUTPATIENT
Start: 2023-06-06 | End: 2023-07-06

## 2023-06-06 RX ORDER — HYDROXYZINE PAMOATE 50 MG/1
50 CAPSULE ORAL 2 TIMES DAILY
Qty: 60 CAPSULE | Refills: 2 | Status: SHIPPED | OUTPATIENT
Start: 2023-06-06 | End: 2023-09-04

## 2023-06-06 NOTE — BH TREATMENT PLAN
TREATMENT PLAN (Medication Management Only)        [facility]    Name and Date of Birth:  Laural Channel 28 y o  1990  Date of Treatment Plan: June 6, 2023  Diagnosis/Diagnoses:    1  Current moderate episode of major depressive disorder, unspecified whether recurrent ([facility] [address] )    2  Anxiety    3  PTSD (post-traumatic stress disorder)    4  Insomnia, unspecified type      Strengths/Personal Resources for Self-Care: supportive family, taking medications as prescribed, ability to adapt to life changes, ability to communicate needs, ability to communicate well, ability to listen, ability to reason, ability to understand psychiatric illness, average or above intelligence, family ties, good understanding of illness, independence, motivation for treatment, ability to negotiate basic needs, sense of humor, special hobby/interest, stable employment, strong zack, well educated, willingness to work on problems, work skills  Area/Areas of need (in own words): anxiety symptoms, depressive symptoms, sleep problems, financial problems  1  Long Term Goal: decrease anxiety  Target Date:6 months - 12/6/2023  Person/Persons responsible for completion of goal: Lindsey  2  Short Term Objective (s) - How will we reach this goal?:   A  Provider new recommended medication/dosage changes and/or continue medication(s): continue current medications as prescribed Cymbalta, Prazosin   B  Attend psychotherapy regularly  C  Take psychiatric medications responsibly  Target Date:6 months - 12/6/2023  Person/Persons Responsible for Completion of Goal: Lindsey  Progress Towards Goals: starting treatment  Treatment Modality: medication management every 4 week  Review due 180 days from date of this plan: 6 months - 12/6/2023  Expected length of service: over 1 year  My Physician and I have developed this plan together and I agree to work on the goals and objectives   I understand the treatment goals that were developed for my treatment
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
## 2023-06-19 ENCOUNTER — TELEPHONE (OUTPATIENT)
Dept: PSYCHIATRY | Facility: CLINIC | Age: 33
End: 2023-06-19

## 2023-06-19 NOTE — LETTER
23       Oral Bentley   [address] [address]       To Whom It May Concern,    Oral Bentley (: 1990) is a patient of mine at the [place] [address] outpatient clinic  Oral Bentley has been under my care since 2022, for a mental disability found in the DSM-5 (Diagnostic and Statistical Manual of Mental Disorders)  I am a psychiatrist licensed in the state of [place]  I am familiar with Aguilar Gerald Bianchilain's history and related mental health symptoms  Due to her mental health symptomatology, Oral Bentley has certain limitations, including: symptoms (i e  those related to coping with stress, anxiety, and negative emotion)  Lindsey Quinones's general sense of unease manifests frequently as both psychological distress and somatic disturbances (i e  autonomic arousal)  In order to help alleviate these difficulties and to enhance her day-to-day functionality, I have prescribed Oral Bentley to obtain a pet to serve as an emotional support animal  I am cognizant of the extensive professional literature concerning the therapeutic benefits of such animals for individuals consumed by pathologic anxiety  The presence of this emotional support animal should help mitigate the symptoms that she experiences and is necessary for supporting her overall mental health  Given these conditions, please allow Oral Bentley to be accompanied by her emotional support dog, during activities that include but are not limited to, airline travel, hotel/lodging and other activities at his/her destination  Please allow Aguilar Duarte to be accompanied by her emotional support dog in the cabin of the aircraft, in accordance with the [place] (Title 49, Section [address] of the [place])  If your company has animal/pet restrictions, please allow a reasonable accommodation, given the reasons outlined above  Please contact me if you have any questions  Sincerely,      Lisa Thomson MD

## 2023-06-20 NOTE — TELEPHONE ENCOUNTER
Michel states that she suffers with depression and when her children are gone she feels much worse  States that she always had animals growing up and feels that getting a small dog will add to her support system  She would like the emotional support animal letter to show her landlord so that she can have a pet  Will refer to Dr Radha Herrera for review

## 2023-06-20 NOTE — TELEPHONE ENCOUNTER
LM on Michel's VM that I was returning her call regarding the letter she is requesting  Requested she call the office back with further information

## 2023-06-21 ENCOUNTER — OFFICE VISIT (OUTPATIENT)
Dept: URGENT CARE | Facility: CLINIC | Age: 33
End: 2023-06-21
Payer: COMMERCIAL

## 2023-06-21 VITALS
SYSTOLIC BLOOD PRESSURE: 134 MMHG | DIASTOLIC BLOOD PRESSURE: 86 MMHG | WEIGHT: 235 LBS | BODY MASS INDEX: 41.64 KG/M2 | TEMPERATURE: 97 F | HEIGHT: 63 IN | OXYGEN SATURATION: 99 % | RESPIRATION RATE: 18 BRPM | HEART RATE: 84 BPM

## 2023-06-21 DIAGNOSIS — J02.9 SORE THROAT: ICD-10-CM

## 2023-06-21 DIAGNOSIS — R05.1 ACUTE COUGH: ICD-10-CM

## 2023-06-21 DIAGNOSIS — H66.92 LEFT ACUTE OTITIS MEDIA: Primary | ICD-10-CM

## 2023-06-21 LAB — S PYO AG THROAT QL: NEGATIVE

## 2023-06-21 PROCEDURE — 87636 SARSCOV2 & INF A&B AMP PRB: CPT | Performed by: PHYSICIAN ASSISTANT

## 2023-06-21 PROCEDURE — 87880 STREP A ASSAY W/OPTIC: CPT | Performed by: PHYSICIAN ASSISTANT

## 2023-06-21 PROCEDURE — 99213 OFFICE O/P EST LOW 20 MIN: CPT | Performed by: PHYSICIAN ASSISTANT

## 2023-06-21 RX ORDER — AMOXICILLIN 500 MG/1
500 CAPSULE ORAL EVERY 12 HOURS SCHEDULED
Qty: 14 CAPSULE | Refills: 0 | Status: SHIPPED | OUTPATIENT
Start: 2023-06-21 | End: 2023-06-28

## 2023-06-21 RX ORDER — BENZONATATE 100 MG/1
100 CAPSULE ORAL 2 TIMES DAILY PRN
Qty: 6 CAPSULE | Refills: 0 | Status: SHIPPED | OUTPATIENT
Start: 2023-06-21 | End: 2023-06-24

## 2023-06-21 NOTE — PROGRESS NOTES
[facility]        NAME: Severa Hugger is a 28 y o  female  : 1990    MRN: [id number]  DATE: 2023  TIME: 5:50 PM    Assessment and Plan   Left acute otitis media [H66 92]  1  Left acute otitis media  amoxicillin (AMOXIL) 500 mg capsule      2  Sore throat  POCT rapid strepA      3  Acute cough  benzonatate (TESSALON PERLES) 100 mg capsule    Covid/Flu-Office Collect        Rapid strep- negative  Will treat left AOM at this time  covid/flu swab pending  Requesting a note that she as seen here today and also a cough medication  Patient Instructions     Rapid strep negative, will treat left ear infection  Take all antibiotics as prescribed  Warm water gargles  Change toothbrush in 2-3 days  Tessalon perles for cough as prescribed if needed  COVID/flu swab collected today, test results pending  Test results are available between 24 and 48 hours  Your results will come through [address]  Check MyChart and call if needed for test results  Quarantine guidelines discussed  OTC supplements/medications discussed  Follow-up with PCP in the next 1-2 days for re-evaluation  Go to the ED if any fevers, unable to stay hydrated, abdominal pain, chest pain, shortness of breath, wheezing, chest tightness, headaches, dizziness, weakness, change in voice, pain or difficulty swallowing, pain or swelling worse on one side of the throat compared to the other, ear drainage, pain swelling redness behind the ear, new or worsening symptoms or other concerning symptoms  Chief Complaint     Chief Complaint   Patient presents with   • Cough     Started day night with a tickle in her throat  History of Present Illness       80-year-old female presents for evaluation of runny nose/nasal congestion, postnasal drip, sore throat, mild intermittent dry cough x5 days  States she recently started having some intermittent ear pain    Notes she has tried multiple over-the-counter allergy medications, cough/cold and cold/sinus medication with some improvement  Patient has had low-grade temperatures of 99 8 degrees with mild generalized body aches  Denies any recent travel or known sick contacts  Declines COVID testing  Eating and drinking normally, staying hydrated  Denies any chest pain, chest tightness, shortness of breath, wheezing, GI/ symptoms or other complaints  Denies any pregnancy risk  Review of Systems   Review of Systems   Constitutional: Negative for activity change, appetite change, chills, fatigue and fever  HENT: Positive for congestion, ear pain, postnasal drip, rhinorrhea and sore throat  Negative for facial swelling, sinus pressure, trouble swallowing and voice change  Eyes: Negative for discharge, itching and visual disturbance  Respiratory: Positive for cough  Negative for chest tightness, shortness of breath and wheezing  Cardiovascular: Negative for chest pain  Gastrointestinal: Negative for abdominal pain, diarrhea, nausea and vomiting  Genitourinary: Negative for decreased urine volume  Musculoskeletal: Negative for back pain and neck pain  Skin: Negative for rash  Neurological: Negative for dizziness, syncope, weakness, numbness and headaches  All other systems reviewed and are negative          Current Medications       Current Outpatient Medications:   •  amoxicillin (AMOXIL) 500 mg capsule, Take 1 capsule (500 mg total) by mouth every 12 (twelve) hours for 7 days, Disp: 14 capsule, Rfl: 0  •  ARIPiprazole (ABILIFY) 5 mg tablet, Take 1 tablet (5 mg total) by mouth daily, Disp: 30 tablet, Rfl: 0  •  benzonatate (TESSALON PERLES) 100 mg capsule, Take 1 capsule (100 mg total) by mouth 2 (two) times a day as needed for cough for up to 3 days, Disp: 6 capsule, Rfl: 0  •  buPROPion (Forfivo XL) 450 MG 24 hr tablet, Take 1 tablet (450 mg total) by mouth daily, Disp: 30 tablet, Rfl: 0  •  hydrOXYzine pamoate (VISTARIL) 50 mg capsule, Take 1 capsule "(50 mg total) by mouth 2 (two) times a day, Disp: 60 capsule, Rfl: 2  •  propranolol (INDERAL) 10 mg tablet, Take 1 tablet (10 mg total) by mouth 3 (three) times a day, Disp: 90 tablet, Rfl: 0  •  DULoxetine (CYMBALTA) 60 mg delayed release capsule, Take 2 capsules (120 mg total) by mouth daily, Disp: 60 capsule, Rfl: 0  •  prochlorperazine (COMPAZINE) 10 mg tablet, One tab up to t i d  p r n  Migraine  Max 3 days per week   (Patient not taking: Reported on 6/21/2023), Disp: 15 tablet, Rfl: 2    Current Allergies     Allergies as of 06/21/2023 - Reviewed 06/21/2023   Allergen Reaction Noted   • Sumatriptan Other (See Comments) 06/03/2019            The following portions of the patient's history were reviewed and updated as appropriate: allergies, current medications, past family history, past medical history, past social history, past surgical history and problem list      Past Medical History:   Diagnosis Date   • Anesthesia     per pt--after ankle surgery was to be discharged and had severe headache -blurred vision -worst migraine feeling ever\" and was admitted over night\"   • Anxiety    • Asthma     \"sports induced \" not on inhalers   • Chronic pain disorder     per pt general nerve and muscle pain--sees a specialized Neurologist at [facility] Dr Jarrod Martines   • CMTD (Charcot-Rosy-Tooth disease)    • Depression    • Heart murmur    • Heavy menses     and occas just \"spotting\"   • History of COVID-19     twice 2020 and 2021--recovered at home   • History of transfusion    • Irregular menses    • Irritable bowel syndrome    • IUD (intrauterine device) in place    • Migraines    • Mitral valve prolapse    • Motion sickness    • Muscle weakness    • PONV (postoperative nausea and vomiting)    • Severe menstrual cramps     \"at times\" per pt   • Shortness of breath     per pt \"with exertion and not new\"   • Urinary frequency    • Vertigo        Past Surgical History:   Procedure Laterality Date   • ANKLE SURGERY " "Right     per pt tendon graph implanted and possible metal implant   • DILATION AND CURETTAGE OF UTERUS     • DILATION AND CURETTAGE OF UTERUS WITH HYSTEROSOCPY N/A 9/22/2022    Procedure: DILATATION AND CURETTAGE (D&C) WITH HYSTEROSCOPY;  Surgeon: Inga Gomez DO;  Location: [facility];  Service: Gynecology   • INSERTION OF INTRAUTERINE DEVICE (IUD)     • NH HYSTEROSCOPY ENDOMETRIAL ABLATION N/A 9/22/2022    Procedure: ABLATION ENDOMETRIAL Delman Rain;  Surgeon: Inga Gomez DO;  Location: [facility];  Service: Gynecology   • NH REMOVAL INTRAUTERINE DEVICE IUD N/A 9/22/2022    Procedure: REMOVAL OF INTRAUTERINE DEVICE (IUD); Surgeon: Inga Gomez DO;  Location: [facility];  Service: Gynecology   • TUBAL LIGATION     • WISDOM TOOTH EXTRACTION         Family History   Problem Relation Age of Onset   • Heart disease Mother    • Other Mother         mutant gene \"heart\"   • Mitral valve prolapse Mother    • No Known Problems Father    • Heart disease Maternal Grandmother          Medications have been verified  Objective   /86   Pulse 84   Temp (!) 97 °F (36 1 °C)   Resp 18   Ht 5' 3\" (1 6 m)   Wt 107 kg (235 lb)   SpO2 99%   BMI 41 63 kg/m²        Physical Exam     Physical Exam  Vitals and nursing note reviewed  Constitutional:       General: She is not in acute distress  Appearance: Normal appearance  She is not ill-appearing or toxic-appearing  HENT:      Head: Normocephalic and atraumatic  Right Ear: Tympanic membrane and ear canal normal  No mastoid tenderness  Left Ear: Ear canal normal  No mastoid tenderness  Tympanic membrane is injected and bulging  Mouth/Throat:      Mouth: Mucous membranes are moist       Pharynx: Posterior oropharyngeal erythema present  No oropharyngeal exudate  Eyes:      Pupils: Pupils are equal, round, and reactive to light  Cardiovascular:      Rate and Rhythm: Normal rate and regular rhythm        Heart sounds: Normal heart " sounds  Pulmonary:      Effort: Pulmonary effort is normal       Breath sounds: Normal breath sounds  No wheezing  Skin:     Capillary Refill: Capillary refill takes less than 2 seconds  Neurological:      Mental Status: She is alert and oriented to person, place, and time     Psychiatric:         Mood and Affect: Mood normal          Behavior: Behavior normal

## 2023-06-21 NOTE — LETTER
June 21, 2023     Patient: Diane Hughes   YOB: 1990   Date of Visit: 6/21/2023       To Whom It May Concern:    Diane Hughes was seen on 6/21/2023  If you have any questions or concerns, please don't hesitate to call           Sincerely,        Kt Vang PA-C    CC: No Recipients

## 2023-06-21 NOTE — PATIENT INSTRUCTIONS
Rapid strep negative, will treat left ear infection  Take all antibiotics as prescribed  Warm water gargles  Change toothbrush in 2-3 days  Tessalon perles for cough as prescribed if needed  COVID/flu swab collected today, test results pending  Test results are available between 24 and 48 hours  Your results will come through [address]  Check MyChart and call if needed for test results  Quarantine guidelines discussed  OTC supplements/medications discussed  Follow-up with PCP in the next 1-2 days for re-evaluation  Go to the ED if any fevers, unable to stay hydrated, abdominal pain, chest pain, shortness of breath, wheezing, chest tightness, headaches, dizziness, weakness, change in voice, pain or difficulty swallowing, pain or swelling worse on one side of the throat compared to the other, ear drainage, pain swelling redness behind the ear, new or worsening symptoms or other concerning symptoms

## 2023-06-22 ENCOUNTER — TELEPHONE (OUTPATIENT)
Dept: URGENT CARE | Facility: CLINIC | Age: 33
End: 2023-06-22

## 2023-06-22 LAB
FLUAV RNA RESP QL NAA+PROBE: NEGATIVE
FLUBV RNA RESP QL NAA+PROBE: NEGATIVE
SARS-COV-2 RNA RESP QL NAA+PROBE: NEGATIVE

## 2023-06-22 NOTE — TELEPHONE ENCOUNTER
Patient called in asking for results of recent Covid/Flu test  Results were discussed and all questions were answered

## 2023-07-02 DIAGNOSIS — F32.1 CURRENT MODERATE EPISODE OF MAJOR DEPRESSIVE DISORDER, UNSPECIFIED WHETHER RECURRENT (HCC): ICD-10-CM

## 2023-07-02 DIAGNOSIS — F43.10 PTSD (POST-TRAUMATIC STRESS DISORDER): ICD-10-CM

## 2023-07-02 RX ORDER — ARIPIPRAZOLE 5 MG/1
TABLET ORAL
Qty: 30 TABLET | Refills: 0 | Status: SHIPPED | OUTPATIENT
Start: 2023-07-02

## 2023-07-02 RX ORDER — PROPRANOLOL HYDROCHLORIDE 10 MG/1
TABLET ORAL
Qty: 90 TABLET | Refills: 0 | Status: SHIPPED | OUTPATIENT
Start: 2023-07-02

## 2023-07-16 DIAGNOSIS — F41.9 ANXIETY: ICD-10-CM

## 2023-07-16 DIAGNOSIS — F32.1 CURRENT MODERATE EPISODE OF MAJOR DEPRESSIVE DISORDER, UNSPECIFIED WHETHER RECURRENT (HCC): ICD-10-CM

## 2023-07-16 RX ORDER — DULOXETIN HYDROCHLORIDE 60 MG/1
CAPSULE, DELAYED RELEASE ORAL
Qty: 60 CAPSULE | Refills: 0 | Status: SHIPPED | OUTPATIENT
Start: 2023-07-16

## 2023-08-18 ENCOUNTER — TELEPHONE (OUTPATIENT)
Dept: PSYCHIATRY | Facility: CLINIC | Age: 33
End: 2023-08-18

## 2023-08-18 NOTE — TELEPHONE ENCOUNTER
VM from Michel stating that due to losing her insurance she has not been able to get her medication and thinks she may be going through withdrawal.  States she has been throwing up, has body aches, gets hot and cold, is crying a lot and is really depressed. She doesn't know what to do. Attempted to call her back but got her VM. LM advising her that she should go to the ER for an evaluation.

## 2023-08-21 NOTE — TELEPHONE ENCOUNTER
Follow up call to [facility].  States she is still having nausea, body aches, and extreme dizziness. She stopped taking her medication about a week ago. Discussed [facility] but she informed me per pharmacy, even with [facility] and their discounts, she will be paying around $160 per month for each Welbutrin and Cymbalta. States that she has maxed out her credit cards and has a negative balance in her account. She only gets child support now and that is just enough to put food on the table for her kids. 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 Called [facility] back and informed her that according to [facility] web site, Duloxetine is $13 for a 30 day supply. Instructed her to look into it and call back so we can send scripts. Will refer to Dr Zachary York for review.

## 2023-08-21 NOTE — TELEPHONE ENCOUNTER
I agree with you Cymbalta and Wellbutrin should not cost more than 50$ per moth using [facility] discount coupon unless she is using the most expensive pharmacy.

## 2023-09-14 ENCOUNTER — APPOINTMENT (OUTPATIENT)
Age: 33
End: 2023-09-14

## 2023-11-13 ENCOUNTER — TELEPHONE (OUTPATIENT)
Age: 33
End: 2023-11-13

## 2023-11-13 ENCOUNTER — TELEPHONE (OUTPATIENT)
Dept: PSYCHIATRY | Facility: CLINIC | Age: 33
End: 2023-11-13

## 2023-11-13 NOTE — TELEPHONE ENCOUNTER
The patient called and wanted her immunization record fax[facility] to [facility] at [phone]   ---records faxed

## 2024-02-21 PROBLEM — N39.0 URINARY TRACT INFECTION WITHOUT HEMATURIA: Status: RESOLVED | Noted: 2020-08-01 | Resolved: 2024-02-21

## 2024-03-09 ENCOUNTER — HOSPITAL ENCOUNTER (EMERGENCY)
Facility: HOSPITAL | Age: 34
Discharge: HOME | End: 2024-03-09
Attending: FAMILY MEDICINE

## 2024-03-09 ENCOUNTER — APPOINTMENT (EMERGENCY)
Dept: RADIOLOGY | Facility: HOSPITAL | Age: 34
End: 2024-03-09

## 2024-03-09 VITALS
SYSTOLIC BLOOD PRESSURE: 137 MMHG | HEART RATE: 91 BPM | TEMPERATURE: 98.6 F | HEIGHT: 62 IN | OXYGEN SATURATION: 98 % | BODY MASS INDEX: 44.16 KG/M2 | RESPIRATION RATE: 18 BRPM | DIASTOLIC BLOOD PRESSURE: 105 MMHG | WEIGHT: 240 LBS

## 2024-03-09 DIAGNOSIS — B34.9 VIRAL SYNDROME: ICD-10-CM

## 2024-03-09 DIAGNOSIS — J10.1 INFLUENZA B: ICD-10-CM

## 2024-03-09 DIAGNOSIS — G43.909 MIGRAINE: Primary | ICD-10-CM

## 2024-03-09 LAB
FLUAV RNA RESP QL NAA+PROBE: NEGATIVE
FLUBV RNA RESP QL NAA+PROBE: POSITIVE
RSV RNA RESP QL NAA+PROBE: NEGATIVE
SARS-COV-2 RNA RESP QL NAA+PROBE: NEGATIVE

## 2024-03-09 PROCEDURE — 0241U HB NFCT DS VIR RESP RNA 4 TRGT: CPT | Performed by: FAMILY MEDICINE

## 2024-03-09 PROCEDURE — 96374 THER/PROPH/DIAG INJ IV PUSH: CPT

## 2024-03-09 PROCEDURE — 71045 X-RAY EXAM CHEST 1 VIEW: CPT

## 2024-03-09 PROCEDURE — 99284 EMERGENCY DEPT VISIT MOD MDM: CPT | Performed by: FAMILY MEDICINE

## 2024-03-09 PROCEDURE — 99283 EMERGENCY DEPT VISIT LOW MDM: CPT

## 2024-03-09 PROCEDURE — 96375 TX/PRO/DX INJ NEW DRUG ADDON: CPT

## 2024-03-09 RX ORDER — METHYLPREDNISOLONE 4 MG/1
TABLET ORAL
Qty: 21 TABLET | Refills: 0 | Status: SHIPPED | OUTPATIENT
Start: 2024-03-09

## 2024-03-09 RX ORDER — DIPHENHYDRAMINE HYDROCHLORIDE 50 MG/ML
25 INJECTION INTRAMUSCULAR; INTRAVENOUS ONCE
Status: COMPLETED | OUTPATIENT
Start: 2024-03-09 | End: 2024-03-09

## 2024-03-09 RX ORDER — METOCLOPRAMIDE HYDROCHLORIDE 5 MG/ML
10 INJECTION INTRAMUSCULAR; INTRAVENOUS ONCE
Status: COMPLETED | OUTPATIENT
Start: 2024-03-09 | End: 2024-03-09

## 2024-03-09 RX ORDER — AMOXICILLIN AND CLAVULANATE POTASSIUM 875; 125 MG/1; MG/1
1 TABLET, FILM COATED ORAL 2 TIMES DAILY
COMMUNITY
Start: 2024-03-05 | End: 2024-03-15

## 2024-03-09 RX ORDER — KETOROLAC TROMETHAMINE 30 MG/ML
30 INJECTION, SOLUTION INTRAMUSCULAR; INTRAVENOUS ONCE
Status: COMPLETED | OUTPATIENT
Start: 2024-03-09 | End: 2024-03-09

## 2024-03-09 RX ORDER — BENZONATATE 200 MG/1
200 CAPSULE ORAL 3 TIMES DAILY PRN
COMMUNITY
Start: 2024-03-05 | End: 2024-03-12

## 2024-03-09 RX ORDER — MAGNESIUM SULFATE HEPTAHYDRATE 40 MG/ML
2 INJECTION, SOLUTION INTRAVENOUS ONCE
Status: COMPLETED | OUTPATIENT
Start: 2024-03-09 | End: 2024-03-09

## 2024-03-09 RX ORDER — DEXAMETHASONE SODIUM PHOSPHATE 10 MG/ML
10 INJECTION, SOLUTION INTRAMUSCULAR; INTRAVENOUS ONCE
Status: COMPLETED | OUTPATIENT
Start: 2024-03-09 | End: 2024-03-09

## 2024-03-09 RX ADMIN — MAGNESIUM SULFATE HEPTAHYDRATE 2 G: 40 INJECTION, SOLUTION INTRAVENOUS at 13:20

## 2024-03-09 RX ADMIN — SODIUM CHLORIDE 1000 ML: 0.9 INJECTION, SOLUTION INTRAVENOUS at 13:08

## 2024-03-09 RX ADMIN — METOCLOPRAMIDE 10 MG: 5 INJECTION, SOLUTION INTRAMUSCULAR; INTRAVENOUS at 13:09

## 2024-03-09 RX ADMIN — DEXAMETHASONE SODIUM PHOSPHATE 10 MG: 10 INJECTION, SOLUTION INTRAMUSCULAR; INTRAVENOUS at 13:10

## 2024-03-09 RX ADMIN — KETOROLAC TROMETHAMINE 30 MG: 30 INJECTION, SOLUTION INTRAMUSCULAR; INTRAVENOUS at 13:19

## 2024-03-09 RX ADMIN — DIPHENHYDRAMINE HYDROCHLORIDE 25 MG: 50 INJECTION, SOLUTION INTRAMUSCULAR; INTRAVENOUS at 13:11

## 2024-03-09 NOTE — Clinical Note
Lindsey Quinones was seen and treated in our emergency department on 3/9/2024.                Diagnosis:     Lindsey  may return to work on return date.    She may return on this date: 03/12/2024         If you have any questions or concerns, please don't hesitate to call.      Ruben Ceballos MD    ______________________________           _______________          _______________  Hospital Representative                              Date                                Time

## 2024-03-09 NOTE — ED PROVIDER NOTES
History  Chief Complaint   Patient presents with    Flu Symptoms     Pt sick 2weeks , cough, fever , currently on augmentin for symptoms    Headache - Recurrent or Known Dx Migraines     Hx of     HPI  This 33-year-old female presented to ED with the complaint of cough nasal congestion and headache.  Patient states has been sick over 2 weeks.  States that she initially had a cough runny nose and went to urgent care started on antibiotics which she stopped taking she also states that that she is been taking Tessalon Perles without much improvement.  Patient states that due to coughing spell started with the headache.  She states he does have a history of migraine and feels that it is worse today.  Patient is denying any chest pain shortness of breath.  Denies abdominal pain nausea vomiting or diarrhea.  Rating her headache 6 out of 10.  Denies any blurry vision double vision.  Prior to Admission Medications   Prescriptions Last Dose Informant Patient Reported? Taking?   ARIPiprazole (ABILIFY) 5 mg tablet Not Taking  No No   Sig: take 1 tablet by mouth once daily   Patient not taking: Reported on 3/9/2024   DULoxetine (CYMBALTA) 60 mg delayed release capsule Not Taking  No No   Sig: take 2 capsules by mouth once daily   Patient not taking: Reported on 3/9/2024   amoxicillin-clavulanate (AUGMENTIN) 875-125 mg per tablet   Yes Yes   Sig: Take 1 tablet by mouth 2 (two) times a day   benzonatate (TESSALON) 200 MG capsule   Yes Yes   Sig: Take 200 mg by mouth Three times daily as needed   buPROPion (Forfivo XL) 450 MG 24 hr tablet   No No   Sig: Take 1 tablet (450 mg total) by mouth daily   hydrOXYzine pamoate (VISTARIL) 50 mg capsule   No No   Sig: Take 1 capsule (50 mg total) by mouth 2 (two) times a day   prochlorperazine (COMPAZINE) 10 mg tablet Not Taking  No No   Sig: One tab up to t.i.d. p.r.n. Migraine.  Max 3 days per week.   Patient not taking: Reported on 6/21/2023   propranolol (INDERAL) 10 mg tablet Not Taking  " No No   Sig: take 1 tablet by mouth three times a day   Patient not taking: Reported on 3/9/2024      Facility-Administered Medications: None       Past Medical History:   Diagnosis Date    Anesthesia     per pt--after ankle surgery was to be discharged and had severe headache -blurred vision -worst migraine feeling ever\" and was admitted over night\"    Anxiety     Asthma     \"sports induced \" not on inhalers    Chronic pain disorder     per pt general nerve and muscle pain--sees a specialized Neurologist at [facility]    CMTD (Charcot-Rosy-Tooth disease)     Depression     Heart murmur     Heavy menses     and occas just \"spotting\"    History of COVID-19     twice 2020 and 2021--recovered at home    History of transfusion     Irregular menses     Irritable bowel syndrome     IUD (intrauterine device) in place     Migraines     Mitral valve prolapse     Motion sickness     Muscle weakness     PONV (postoperative nausea and vomiting)     Severe menstrual cramps     \"at times\" per pt    Shortness of breath     per pt \"with exertion and not new\"    Urinary frequency     Vertigo        Past Surgical History:   Procedure Laterality Date    ANKLE SURGERY Right     per pt tendon graph implanted and possible metal implant    DILATION AND CURETTAGE OF UTERUS      DILATION AND CURETTAGE OF UTERUS WITH HYSTEROSOCPY N/A 9/22/2022    Procedure: DILATATION AND CURETTAGE (D&C) WITH HYSTEROSCOPY;  Surgeon: Kristan Toledo DO;  Location: [facility];  Service: Gynecology    INSERTION OF INTRAUTERINE DEVICE (IUD)      CA HYSTEROSCOPY ENDOMETRIAL ABLATION N/A 9/22/2022    Procedure: ABLATION ENDOMETRIAL NOVASURE;  Surgeon: Kristan Toledo DO;  Location: [facility];  Service: Gynecology    CA REMOVAL INTRAUTERINE DEVICE IUD N/A 9/22/2022    Procedure: REMOVAL OF INTRAUTERINE DEVICE (IUD);  Surgeon: Kristan Toledo DO;  Location: [facility];  Service: Gynecology    TUBAL LIGATION      WISDOM TOOTH EXTRACTION   " "      Family History   Problem Relation Age of Onset    Heart disease Mother     Other Mother         mutant gene \"heart\"    Mitral valve prolapse Mother     No Known Problems Father     Heart disease Maternal Grandmother      I have reviewed and agree with the history as documented.    E-Cigarette/Vaping    E-Cigarette Use Never User      E-Cigarette/Vaping Substances    Nicotine No     THC No     CBD No     Flavoring No     Other No     Unknown No      Social History     Tobacco Use    Smoking status: Every Day     Types: E-Cigarettes    Smokeless tobacco: Never   Vaping Use    Vaping status: Never Used   Substance Use Topics    Alcohol use: Yes     Comment: social    Drug use: Yes     Types: Marijuana     Comment: medical card       Review of Systems   Constitutional:  Positive for fever. Negative for chills.   HENT:  Positive for congestion and rhinorrhea. Negative for sore throat.    Eyes:  Negative for visual disturbance.   Respiratory:  Positive for cough. Negative for shortness of breath.    Cardiovascular:  Negative for chest pain and leg swelling.   Gastrointestinal:  Negative for abdominal pain, diarrhea, nausea and vomiting.   Genitourinary:  Negative for dysuria.   Musculoskeletal:  Negative for back pain and myalgias.   Skin:  Negative for rash.   Neurological:  Positive for headaches. Negative for dizziness.   Psychiatric/Behavioral:  Negative for confusion.    All other systems reviewed and are negative.      Physical Exam  Physical Exam  Vitals and nursing note reviewed.   Constitutional:       Appearance: She is well-developed.   HENT:      Head: Normocephalic and atraumatic.      Right Ear: External ear normal.      Left Ear: External ear normal.      Nose: Nose normal.      Mouth/Throat:      Mouth: Mucous membranes are moist.      Pharynx: No oropharyngeal exudate.   Eyes:      General: No scleral icterus.        Right eye: No discharge.         Left eye: No discharge.      Conjunctiva/sclera: " Conjunctivae normal.      Pupils: Pupils are equal, round, and reactive to light.   Cardiovascular:      Rate and Rhythm: Normal rate and regular rhythm.      Pulses: Normal pulses.      Heart sounds: Normal heart sounds.   Pulmonary:      Effort: Pulmonary effort is normal. No respiratory distress.      Breath sounds: Normal breath sounds. No wheezing.   Abdominal:      General: Bowel sounds are normal.      Palpations: Abdomen is soft.   Musculoskeletal:         General: Normal range of motion.      Cervical back: Normal range of motion and neck supple.   Lymphadenopathy:      Cervical: No cervical adenopathy.   Skin:     General: Skin is warm and dry.      Capillary Refill: Capillary refill takes less than 2 seconds.   Neurological:      General: No focal deficit present.      Mental Status: She is alert and oriented to person, place, and time.   Psychiatric:         Mood and Affect: Mood normal.         Behavior: Behavior normal.         Vital Signs  ED Triage Vitals [03/09/24 1226]   Temperature Pulse Respirations Blood Pressure SpO2   98.6 °F (37 °C) 91 18 (!) 137/105 98 %      Temp Source Heart Rate Source Patient Position - Orthostatic VS BP Location FiO2 (%)   Temporal -- -- -- --      Pain Score       8           Vitals:    03/09/24 1226   BP: (!) 137/105   Pulse: 91         Visual Acuity      ED Medications  Medications   magnesium sulfate 2 g/50 mL IVPB (premix) 2 g (2 g Intravenous New Bag 3/9/24 1320)   ketorolac (TORADOL) injection 30 mg (30 mg Intravenous Given 3/9/24 1319)   metoclopramide (REGLAN) injection 10 mg (10 mg Intravenous Given 3/9/24 1309)   diphenhydrAMINE (BENADRYL) injection 25 mg (25 mg Intravenous Given 3/9/24 1311)   sodium chloride 0.9 % bolus 1,000 mL (1,000 mL Intravenous New Bag 3/9/24 1308)   dexamethasone (PF) (DECADRON) injection 10 mg (10 mg Intravenous Given 3/9/24 1310)       Diagnostic Studies  Results Reviewed       Procedure Component Value Units Date/Time     FLU/RSV/COVID - if FLU/RSV clinically relevant [[id number]]  (Abnormal) Collected: 03/09/24 1248    Lab Status: Final result Specimen: Nares from Nose Updated: 03/09/24 1332     SARS-CoV-2 Negative     INFLUENZA A PCR Negative     INFLUENZA B PCR Positive     RSV PCR Negative    Narrative:      FOR PEDIATRIC PATIENTS - copy/paste COVID Guidelines URL to browser: [email or web address]    SARS-CoV-2 assay is a Nucleic Acid Amplification assay intended for the  qualitative detection of nucleic acid from SARS-CoV-2 in nasopharyngeal  swabs. Results are for the presumptive identification of SARS-CoV-2 RNA.    Positive results are indicative of infection with SARS-CoV-2, the virus  causing COVID-19, but do not rule out bacterial infection or co-infection  with other viruses. Laboratories within the [place] and its  territories are required to report all positive results to the appropriate  public health authorities. Negative results do not preclude SARS-CoV-2  infection and should not be used as the sole basis for treatment or other  patient management decisions. Negative results must be combined with  clinical observations, patient history, and epidemiological information.  This test has not been FDA cleared or approved.    This test has been authorized by FDA under an Emergency Use Authorization  (EUA). This test is only authorized for the duration of time the  declaration that circumstances exist justifying the authorization of the  emergency use of an in vitro diagnostic tests for detection of SARS-CoV-2  virus and/or diagnosis of COVID-19 infection under section 564(b)(1) of  the Act, 21 U.S.C. 360bbb-3(b)(1), unless the authorization is terminated  or revoked sooner. The test has been validated but independent review by FDA  and CLIA is pending.    Test performed using [employer]: This RT-PCR assay targets N2,  a region unique to SARS-CoV-2. A  conserved region in the E-gene was chosen  for pan-Sarbecovirus detection which includes SARS-CoV-2.    According to CMS-2020-01-R, this platform meets the definition of high-throughput technology.                   XR chest 1 view portable   Final Result by Stephen Kwan MD (03/09 1343)      No acute cardiopulmonary disease.            Workstation performed: [id number]                    Procedures  Procedures         ED Course                               SBIRT 20yo+      Flowsheet Row Most Recent Value   Initial Alcohol Screen: US AUDIT-C     1. How often do you have a drink containing alcohol? 0 Filed at: 03/09/2024 1228   2. How many drinks containing alcohol do you have on a typical day you are drinking?  0 Filed at: 03/09/2024 1228   3b. FEMALE Any Age, or MALE 65+: How often do you have 4 or more drinks on one occassion? 0 Filed at: 03/09/2024 1228   Audit-C Score 0 Filed at: 03/09/2024 1228   BG: How many times in the past year have you...    Used an illegal drug or used a prescription medication for non-medical reasons? Never Filed at: 03/09/2024 1228                      Medical Decision Making  This 33-year-old female presented to ED with the complaint of cough nasal congestion and headache.  Patient states has been sick over 2 weeks.  States that she initially had a cough runny nose and went to urgent care started on antibiotics which she stopped taking she also states that that she is been taking Tessalon Perles without much improvement.  Patient states that due to coughing spell started with the headache.  She states he does have a history of migraine and feels that it is worse today.  Patient is denying any chest pain shortness of breath.  Denies abdominal pain nausea vomiting or diarrhea.  Rating her headache 6 out of 10.  Denies any blurry vision double vision.  History is taken from patient.  DDX: Viral syndrome/Migraine/ covid/flu /sinusitis/pneumonia    Plan: Started on migraine  cocktail . x-ray to rule out infection  Lab reviewed which shows the patient is positive for influenza B.  Recommending supportive treatment patient started on cough medication and a short course of steroid.  Told patient to stop taking her antibiotics continue with supportive treatment at home.  Patient was assessed at bedside multiple times states feeling much better after the pain medication and fluid resuscitation.  Disposition discharge home with the strict precaution to return to the ED if symptom does not improve or worsen.  Patient verbalized understand plan discharge home.      Amount and/or Complexity of Data Reviewed  Radiology: ordered.    Risk  Prescription drug management.             Disposition  Final diagnoses:   Migraine   Viral syndrome   Influenza B     Time reflects when diagnosis was documented in both MDM as applicable and the Disposition within this note       Time User Action Codes Description Comment    3/9/2024  1:27 PM Tucker Ruben Add [G43.909] Migraine     3/9/2024  1:27 PM Ahmalex, Ruben Add [B34.9] Viral syndrome     3/9/2024  1:49 PM Ahmalex, Ruben Add [J10.1] Influenza B           ED Disposition       ED Disposition   Discharge    Condition   Stable    Date/Time   Sat Mar 9, 2024 1247    Comment   Lindsey Quinones discharge to home/self care.                   Follow-up Information       Follow up With Specialties Details Why Contact Info    ANNEMARIE Ruiz Nurse Practitioner Schedule an appointment as soon as possible for a visit in 2 days If symptoms worsen [address] [facility] [address] [phone]              Patient's Medications   Discharge Prescriptions    GUAIFENESIN (ROBITUSSIN) 100 MG/5 ML SYRUP    Take 10 mL (200 mg total) by mouth 3 (three) times a day as needed for cough for up to 10 days       Start Date: 3/9/2024  End Date: 3/19/2024       Order Dose: 200 mg       Quantity: 120 mL    Refills: 0    METHYLPREDNISOLONE 4 MG TABLET THERAPY PACK    Use as directed on  package       Start Date: 3/9/2024  End Date: --       Order Dose: --       Quantity: 21 tablet    Refills: 0       No discharge procedures on file.    PDMP Review         Value Time User    PDMP Reviewed  Yes 10/31/2022 10:16 AM Ekaterina Aly MD            ED Provider  Electronically Signed by             Ruben Ceballos MD  03/09/24 3418

## 2024-05-22 ENCOUNTER — TELEPHONE (OUTPATIENT)
Dept: FAMILY MEDICINE CLINIC | Facility: CLINIC | Age: 34
End: 2024-05-22

## 2024-05-22 NOTE — TELEPHONE ENCOUNTER
Call patient to schedule an appointment as it's almost been 2 years since last seen.  She will obtain insurance in June and will schedule at that time. Will follow up with patient.

## 2024-07-31 ENCOUNTER — TELEPHONE (OUTPATIENT)
Age: 34
End: 2024-07-31

## 2024-07-31 NOTE — TELEPHONE ENCOUNTER
Patient called in looking to start medication management services again now that they have insurance back.    Writer added patient to proper wait list.    While patient remains on the wait list please mail out the outside resource packet to the confirmed address on the chart.    The patients new insurance is:  [employer] 2  ID: [id number]  Group : [id number]

## 2025-01-25 ENCOUNTER — HOSPITAL ENCOUNTER (EMERGENCY)
Facility: HOSPITAL | Age: 35
Discharge: HOME | End: 2025-01-25
Attending: FAMILY MEDICINE | Admitting: STUDENT IN AN ORGANIZED HEALTH CARE EDUCATION/TRAINING PROGRAM
Payer: COMMERCIAL

## 2025-01-25 ENCOUNTER — APPOINTMENT (EMERGENCY)
Dept: RADIOLOGY | Facility: HOSPITAL | Age: 35
End: 2025-01-25
Payer: COMMERCIAL

## 2025-01-25 VITALS
RESPIRATION RATE: 16 BRPM | HEART RATE: 54 BPM | OXYGEN SATURATION: 98 % | DIASTOLIC BLOOD PRESSURE: 89 MMHG | SYSTOLIC BLOOD PRESSURE: 142 MMHG | TEMPERATURE: 98.4 F

## 2025-01-25 DIAGNOSIS — U07.1 COVID: Primary | ICD-10-CM

## 2025-01-25 LAB
EXT PREGNANCY TEST URINE: NEGATIVE
EXT. CONTROL: NORMAL
FLUAV AG UPPER RESP QL IA.RAPID: NEGATIVE
FLUBV AG UPPER RESP QL IA.RAPID: NEGATIVE
S PYO DNA THROAT QL NAA+PROBE: NOT DETECTED
SARS-COV+SARS-COV-2 AG RESP QL IA.RAPID: POSITIVE

## 2025-01-25 PROCEDURE — 87651 STREP A DNA AMP PROBE: CPT | Performed by: FAMILY MEDICINE

## 2025-01-25 PROCEDURE — 87804 INFLUENZA ASSAY W/OPTIC: CPT | Performed by: FAMILY MEDICINE

## 2025-01-25 PROCEDURE — 87811 SARS-COV-2 COVID19 W/OPTIC: CPT | Performed by: FAMILY MEDICINE

## 2025-01-25 PROCEDURE — 81025 URINE PREGNANCY TEST: CPT | Performed by: PHYSICIAN ASSISTANT

## 2025-01-25 PROCEDURE — 99283 EMERGENCY DEPT VISIT LOW MDM: CPT

## 2025-01-25 PROCEDURE — 99284 EMERGENCY DEPT VISIT MOD MDM: CPT | Performed by: PHYSICIAN ASSISTANT

## 2025-01-25 PROCEDURE — 71045 X-RAY EXAM CHEST 1 VIEW: CPT

## 2025-01-25 PROCEDURE — 96372 THER/PROPH/DIAG INJ SC/IM: CPT

## 2025-01-25 RX ORDER — IBUPROFEN 600 MG/1
600 TABLET, FILM COATED ORAL EVERY 6 HOURS PRN
Qty: 30 TABLET | Refills: 0 | Status: SHIPPED | OUTPATIENT
Start: 2025-01-25 | End: 2025-02-04

## 2025-01-25 RX ORDER — ALBUTEROL SULFATE 90 UG/1
2 INHALANT RESPIRATORY (INHALATION) ONCE
Status: COMPLETED | OUTPATIENT
Start: 2025-01-25 | End: 2025-01-25

## 2025-01-25 RX ORDER — BENZONATATE 100 MG/1
100 CAPSULE ORAL 3 TIMES DAILY PRN
Qty: 20 CAPSULE | Refills: 0 | Status: SHIPPED | OUTPATIENT
Start: 2025-01-25

## 2025-01-25 RX ORDER — KETOROLAC TROMETHAMINE 30 MG/ML
15 INJECTION, SOLUTION INTRAMUSCULAR; INTRAVENOUS ONCE
Status: COMPLETED | OUTPATIENT
Start: 2025-01-25 | End: 2025-01-25

## 2025-01-25 RX ADMIN — ALBUTEROL SULFATE 2 PUFF: 90 AEROSOL, METERED RESPIRATORY (INHALATION) at 18:03

## 2025-01-25 RX ADMIN — KETOROLAC TROMETHAMINE 15 MG: 30 INJECTION, SOLUTION INTRAMUSCULAR; INTRAVENOUS at 18:02

## 2025-01-25 NOTE — Clinical Note
Lindsey Quinones was seen and treated in our emergency department on 1/25/2025.                Diagnosis:     Lindsey  .    She may return on this date: 01/29/2025    + COVID     If you have any questions or concerns, please don't hesitate to call.      Penny Zarate PA-C    ______________________________           _______________          _______________  Hospital Representative                              Date                                Time

## 2025-01-25 NOTE — ED PROVIDER NOTES
"Time reflects when diagnosis was documented in both MDM as applicable and the Disposition within this note       Time User Action Codes Description Comment    1/25/2025  6:08 PM Penny Zarate Add [U07.1] COVID           ED Disposition       ED Disposition   Discharge    Condition   Stable    Date/Time   Sat Jan 25, 2025  6:08 PM    Comment   Lindsey Quinones discharge to home/self care.                   Assessment & Plan       Medical Decision Making  Covid + discussed tx  Discussed dc tamiflu - not going to help - discussed meds for home and follow up     Amount and/or Complexity of Data Reviewed  Labs: ordered. Decision-making details documented in ED Course.    Risk  Prescription drug management.        ED Course as of 01/25/25 1839   Sat Jan 25, 2025   1737 SARS COV Rapid Antigen(!): Positive       Medications   ketorolac (TORADOL) injection 15 mg (15 mg Intramuscular Given 1/25/25 1802)   albuterol (PROVENTIL HFA,VENTOLIN HFA) inhaler 2 puff (2 puffs Inhalation Given 1/25/25 1803)       ED Risk Strat Scores                                              History of Present Illness       Chief Complaint   Patient presents with    Flu Symptoms     Cough, fever, sore throat, lethargy.   Onset Wednesday night   Was started on tamiflu at [facility] with no test.       Past Medical History:   Diagnosis Date    Anesthesia     per pt--after ankle surgery was to be discharged and had severe headache -blurred vision -worst migraine feeling ever\" and was admitted over night\"    Anxiety     Asthma     \"sports induced \" not on inhalers    Chronic pain disorder     per pt general nerve and muscle pain--sees a specialized Neurologist at [facility] Dr Gray    CMTD (Charcot-Rosy-Tooth disease)     Depression     Heart murmur     Heavy menses     and occas just \"spotting\"    History of COVID-19     twice 2020 and 2021--recovered at home    History of transfusion     Irregular menses     Irritable bowel syndrome     IUD " "(intrauterine device) in place     Migraines     Mitral valve prolapse     Motion sickness     Muscle weakness     PONV (postoperative nausea and vomiting)     Severe menstrual cramps     \"at times\" per pt    Shortness of breath     per pt \"with exertion and not new\"    Urinary frequency     Vertigo       Past Surgical History:   Procedure Laterality Date    ANKLE SURGERY Right     per pt tendon graph implanted and possible metal implant    DILATION AND CURETTAGE OF UTERUS      DILATION AND CURETTAGE OF UTERUS WITH HYSTEROSOCPY N/A 9/22/2022    Procedure: DILATATION AND CURETTAGE (D&C) WITH HYSTEROSCOPY;  Surgeon: Kristan Toledo DO;  Location: [facility];  Service: Gynecology    INSERTION OF INTRAUTERINE DEVICE (IUD)      UT HYSTEROSCOPY ENDOMETRIAL ABLATION N/A 9/22/2022    Procedure: ABLATION ENDOMETRIAL NOVASURE;  Surgeon: Kristan Toledo DO;  Location: [facility];  Service: Gynecology    UT REMOVAL INTRAUTERINE DEVICE IUD N/A 9/22/2022    Procedure: REMOVAL OF INTRAUTERINE DEVICE (IUD);  Surgeon: Kristan Toledo DO;  Location: [facility];  Service: Gynecology    TUBAL LIGATION      WISDOM TOOTH EXTRACTION        Family History   Problem Relation Age of Onset    Heart disease Mother     Other Mother         mutant gene \"heart\"    Mitral valve prolapse Mother     No Known Problems Father     Heart disease Maternal Grandmother       Social History     Tobacco Use    Smoking status: Every Day     Types: E-Cigarettes    Smokeless tobacco: Never   Vaping Use    Vaping status: Never Used   Substance Use Topics    Alcohol use: Yes     Comment: social    Drug use: Yes     Types: Marijuana     Comment: medical card      E-Cigarette/Vaping    E-Cigarette Use Never User       E-Cigarette/Vaping Substances    Nicotine No     THC No     CBD No     Flavoring No     Other No     Unknown No       I have reviewed and agree with the history as documented.     Patient presents emergency department with upper respiratory " symptoms cough congestion and fevers over the past 4 to 5 days was seen initially at urgent care and diagnosed with flulike illness and given Tamiflu.  Patient states that she is not getting better and she was concerned and came in for evaluation.  Patient taking Tylenol and ibuprofen over-the-counter.  Patient has a nerve disease and she states that she has been having increased nerve pain since she has been sick.        Review of Systems   Constitutional:  Positive for fever.   HENT:  Positive for congestion.    Respiratory:  Positive for cough.    Cardiovascular: Negative.    Gastrointestinal:  Positive for vomiting. Negative for diarrhea.   Genitourinary: Negative.    Musculoskeletal:  Positive for arthralgias and myalgias.   All other systems reviewed and are negative.          Objective       ED Triage Vitals [01/25/25 1656]   Temperature Pulse Blood Pressure Respirations SpO2 Patient Position - Orthostatic VS   98.4 °F (36.9 °C) (!) 54 142/89 16 98 % Sitting      Temp Source Heart Rate Source BP Location FiO2 (%) Pain Score    Tympanic Monitor Left arm -- --      Vitals      Date and Time Temp Pulse SpO2 Resp BP Pain Score FACES Pain Rating User   01/25/25 1656 98.4 °F (36.9 °C) 54 98 % 16 142/89 -- -- NAD            Physical Exam  Vitals and nursing note reviewed.   Constitutional:       Appearance: She is well-developed.   HENT:      Head: Normocephalic and atraumatic.      Right Ear: Tympanic membrane and external ear normal.      Left Ear: Tympanic membrane and external ear normal.   Eyes:      Conjunctiva/sclera: Conjunctivae normal.   Cardiovascular:      Rate and Rhythm: Normal rate and regular rhythm.      Heart sounds: Normal heart sounds.   Pulmonary:      Effort: Pulmonary effort is normal. No respiratory distress.      Breath sounds: Normal breath sounds.   Abdominal:      General: Bowel sounds are normal.      Palpations: Abdomen is soft.      Tenderness: There is no abdominal tenderness.    Musculoskeletal:         General: Normal range of motion.      Cervical back: Neck supple.      Right lower leg: No edema.      Left lower leg: No edema.   Lymphadenopathy:      Cervical: No cervical adenopathy.   Skin:     General: Skin is warm.      Findings: No rash.   Neurological:      Mental Status: She is alert.   Psychiatric:         Behavior: Behavior normal.         Results Reviewed       Procedure Component Value Units Date/Time    POCT pregnancy, urine [[id number]]  (Normal) Collected: 01/25/25 1803    Lab Status: Final result Updated: 01/25/25 1803     EXT Preg Test, Ur Negative     Control Valid    Strep A PCR [[id number]]  (Normal) Collected: 01/25/25 1658    Lab Status: Final result Specimen: Throat Updated: 01/25/25 1729     STREP A PCR Not Detected    FLU/COVID Rapid Antigen (30 min. TAT) - Preferred screening test in ED [[id number]]  (Abnormal) Collected: 01/25/25 1658    Lab Status: Final result Specimen: Nares from Nose Updated: 01/25/25 1723     SARS COV Rapid Antigen Positive     Influenza A Rapid Antigen Negative     Influenza B Rapid Antigen Negative    Narrative:      This test has been performed using the Quidel [employer] 2 FLU+SARS Antigen test under the Emergency Use Authorization (EUA). This test has been validated by the  and verified by the performing laboratory. The Yanique uses lateral flow immunofluorescent sandwich assay to detect SARS-COV, Influenza A and Influenza B Antigen.     The Quidel Yanique 2 SARS Antigen test does not differentiate between SARS-CoV and SARS-CoV-2.     Negative results are presumptive and may be confirmed with a molecular assay, if necessary, for patient management. Negative results do not rule out SARS-CoV-2 or influenza infection and should not be used as the sole basis for treatment or patient management decisions. A negative test result may occur if the level of antigen in a sample is below the limit of detection of this test.     Positive results  are indicative of the presence of viral antigens, but do not rule out bacterial infection or co-infection with other viruses.     All test results should be used as an adjunct to clinical observations and other information available to the provider.    FOR PEDIATRIC PATIENTS - copy/paste COVID Guidelines URL to browser: [email or web address]            XR chest 1 view portable    (Results Pending)       Procedures    ED Medication and Procedure Management   Prior to Admission Medications   Prescriptions Last Dose Informant Patient Reported? Taking?   ARIPiprazole (ABILIFY) 5 mg tablet   No No   Sig: take 1 tablet by mouth once daily   Patient not taking: Reported on 3/9/2024   DULoxetine (CYMBALTA) 60 mg delayed release capsule   No No   Sig: take 2 capsules by mouth once daily   Patient not taking: Reported on 3/9/2024   buPROPion (Forfivo XL) 450 MG 24 hr tablet   No No   Sig: Take 1 tablet (450 mg total) by mouth daily   hydrOXYzine pamoate (VISTARIL) 50 mg capsule   No No   Sig: Take 1 capsule (50 mg total) by mouth 2 (two) times a day   methylPREDNISolone 4 MG tablet therapy pack   No No   Sig: Use as directed on package   prochlorperazine (COMPAZINE) 10 mg tablet   No No   Sig: One tab up to t.i.d. p.r.n. Migraine.  Max 3 days per week.   Patient not taking: Reported on 6/21/2023   propranolol (INDERAL) 10 mg tablet   No No   Sig: take 1 tablet by mouth three times a day   Patient not taking: Reported on 3/9/2024      Facility-Administered Medications: None     Discharge Medication List as of 1/25/2025  6:11 PM        START taking these medications    Details   benzonatate (TESSALON PERLES) 100 mg capsule Take 1 capsule (100 mg total) by mouth 3 (three) times a day as needed for cough, Starting Sat 1/25/2025, Normal      ibuprofen (MOTRIN) 600 mg tablet Take 1 tablet (600 mg total) by mouth every 6 (six) hours as needed for mild pain for up to 10 days,  Starting Sat 1/25/2025, Until Tue 2/4/2025 at 2359, Normal           CONTINUE these medications which have NOT CHANGED    Details   ARIPiprazole (ABILIFY) 5 mg tablet take 1 tablet by mouth once daily, Normal      buPROPion (Forfivo XL) 450 MG 24 hr tablet Take 1 tablet (450 mg total) by mouth daily, Starting Tue 6/6/2023, Until Thu 7/6/2023, Normal      DULoxetine (CYMBALTA) 60 mg delayed release capsule take 2 capsules by mouth once daily, Normal      hydrOXYzine pamoate (VISTARIL) 50 mg capsule Take 1 capsule (50 mg total) by mouth 2 (two) times a day, Starting Tue 6/6/2023, Until Mon 9/4/2023, Normal      methylPREDNISolone 4 MG tablet therapy pack Use as directed on package, Normal      prochlorperazine (COMPAZINE) 10 mg tablet One tab up to t.i.d. p.r.n. Migraine.  Max 3 days per week., Normal      propranolol (INDERAL) 10 mg tablet take 1 tablet by mouth three times a day, Normal           No discharge procedures on file.  ED SEPSIS DOCUMENTATION   Time reflects when diagnosis was documented in both MDM as applicable and the Disposition within this note       Time User Action Codes Description Comment    1/25/2025  6:08 PM Penny Zarate Add [U07.1] MARTHA Zarate PA-C  01/25/25 5921

## 2025-02-03 ENCOUNTER — TELEPHONE (OUTPATIENT)
Dept: NEUROLOGY | Facility: CLINIC | Age: 35
End: 2025-02-03

## 2025-02-25 ENCOUNTER — OFFICE VISIT (OUTPATIENT)
Dept: NEUROLOGY | Facility: CLINIC | Age: 35
End: 2025-02-25
Payer: COMMERCIAL

## 2025-02-25 VITALS
BODY MASS INDEX: 43.61 KG/M2 | HEIGHT: 62 IN | WEIGHT: 237 LBS | HEART RATE: 91 BPM | SYSTOLIC BLOOD PRESSURE: 118 MMHG | DIASTOLIC BLOOD PRESSURE: 80 MMHG | TEMPERATURE: 98 F

## 2025-02-25 DIAGNOSIS — G43.101 MIGRAINE WITH AURA AND WITH STATUS MIGRAINOSUS, NOT INTRACTABLE: ICD-10-CM

## 2025-02-25 DIAGNOSIS — G60.0 CHARCOT-MARIE-TOOTH DISEASE: Primary | ICD-10-CM

## 2025-02-25 PROCEDURE — 99204 OFFICE O/P NEW MOD 45 MIN: CPT | Performed by: PSYCHIATRY & NEUROLOGY
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
## 2025-02-25 NOTE — ASSESSMENT & PLAN NOTE
Lost follow up with Dr. Eb Faustin. Last office visit was on 10/27/2022  Patient has tried Sumatriptan and Rizatriptan with significant side effects.   With Sumatriptan: she has left sided weakness, left facial droop   With rizatriptan: less severe compared to Sumatriptan but she still has heavy feelings and flushing. She has to stay home due to these side effects  Strong family history of cardiac issues    Patient continues to have 4 migraine episodes per week at this time. She is taking ibuprofen and tylenol almost every day.    -Will initiate Nurtec as a preventative medication  -Recommend to not take ibuprofen more than 2-3 times per week  - lifestyle modification and decent sleep  -Will reach out to Dr. Faustin for follow up    Orders:    rimegepant sulfate (NURTEC) 75 mg TBDP; Take 1 tablet (75 mg total) by mouth every other day
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
## 2025-02-26 ENCOUNTER — TELEPHONE (OUTPATIENT)
Dept: NEUROLOGY | Facility: CLINIC | Age: 35
End: 2025-02-26

## 2025-02-26 NOTE — TELEPHONE ENCOUNTER
----- Message from David Agee MD sent at 2/25/2025  3:55 PM EST -----  Can we please get authorization for genetic testing for this patient, [facility] comprehensive neuropathy panel.  Thank you

## 2025-02-26 NOTE — TELEPHONE ENCOUNTER
LMOM for pt to schedule f/u in 1-2 month [facility]. Offerd them 3/31 at 2:30p in  or 4/3 at 9:30a in [place] (okay to override, call center please message me).

## 2025-02-26 NOTE — TELEPHONE ENCOUNTER
Primary- [facility] [phone]  No Secondary on file.  [employer] Comp Neuropathy Panel   Test code: 04627

## 2025-03-04 NOTE — TELEPHONE ENCOUNTER
Emailed [employer] for proper CPT codes to submit to [facility] insurance plan.  Will await their response.

## 2025-03-06 NOTE — TELEPHONE ENCOUNTER
Alicia [facility] Suly spoke with Rep Saldana  CPT code 01154 does require a PA  Must fax PA and all required documentation to   FAX# [phone]  Must FAX PA Form, Clinical documentation, and Cover sheet with Patient's Name, , ID#

## 2025-03-06 NOTE — TELEPHONE ENCOUNTER
Cornelio Moore now (4:14 PM)       Faxed prior auth form/ last office note to [facility].

## 2025-03-06 NOTE — TELEPHONE ENCOUNTER
Recv'd email response regarding CPT codes:  The CPT code(s) for the  [facility] Comprehensive Neuropathies Panel (Test Code: 39593) is 80288 (x8).

## 2025-05-16 ENCOUNTER — HOSPITAL ENCOUNTER (EMERGENCY)
Facility: HOSPITAL | Age: 35
Discharge: HOME | End: 2025-05-16
Attending: FAMILY MEDICINE
Payer: COMMERCIAL

## 2025-05-16 ENCOUNTER — APPOINTMENT (EMERGENCY)
Dept: CT IMAGING | Facility: HOSPITAL | Age: 35
End: 2025-05-16
Payer: COMMERCIAL

## 2025-05-16 VITALS
BODY MASS INDEX: 43.24 KG/M2 | OXYGEN SATURATION: 98 % | HEIGHT: 62 IN | WEIGHT: 235 LBS | DIASTOLIC BLOOD PRESSURE: 77 MMHG | SYSTOLIC BLOOD PRESSURE: 143 MMHG | HEART RATE: 64 BPM | TEMPERATURE: 97.4 F | RESPIRATION RATE: 18 BRPM

## 2025-05-16 DIAGNOSIS — R13.10 DIFFICULTY IN SWALLOWING: ICD-10-CM

## 2025-05-16 DIAGNOSIS — R13.10 DYSPHAGIA: Primary | ICD-10-CM

## 2025-05-16 LAB — S PYO DNA THROAT QL NAA+PROBE: NOT DETECTED

## 2025-05-16 PROCEDURE — 87651 STREP A DNA AMP PROBE: CPT | Performed by: FAMILY MEDICINE

## 2025-05-16 PROCEDURE — 99284 EMERGENCY DEPT VISIT MOD MDM: CPT

## 2025-05-16 PROCEDURE — 96372 THER/PROPH/DIAG INJ SC/IM: CPT

## 2025-05-16 PROCEDURE — 70490 CT SOFT TISSUE NECK W/O DYE: CPT

## 2025-05-16 RX ORDER — DEXAMETHASONE SODIUM PHOSPHATE 10 MG/ML
10 INJECTION, SOLUTION INTRAMUSCULAR; INTRAVENOUS ONCE
Status: COMPLETED | OUTPATIENT
Start: 2025-05-16 | End: 2025-05-16

## 2025-05-16 RX ORDER — MAGNESIUM HYDROXIDE/ALUMINUM HYDROXICE/SIMETHICONE 120; 1200; 1200 MG/30ML; MG/30ML; MG/30ML
30 SUSPENSION ORAL ONCE
Status: COMPLETED | OUTPATIENT
Start: 2025-05-16 | End: 2025-05-16

## 2025-05-16 RX ORDER — KETOROLAC TROMETHAMINE 30 MG/ML
30 INJECTION, SOLUTION INTRAMUSCULAR; INTRAVENOUS ONCE
Status: COMPLETED | OUTPATIENT
Start: 2025-05-16 | End: 2025-05-16

## 2025-05-16 RX ORDER — LIDOCAINE HYDROCHLORIDE 20 MG/ML
15 SOLUTION OROPHARYNGEAL ONCE
Status: COMPLETED | OUTPATIENT
Start: 2025-05-16 | End: 2025-05-16

## 2025-05-16 RX ORDER — FLUCONAZOLE 200 MG/1
200 TABLET ORAL DAILY
Qty: 5 TABLET | Refills: 0 | Status: SHIPPED | OUTPATIENT
Start: 2025-05-16 | End: 2025-05-21

## 2025-05-16 RX ORDER — LIDOCAINE HYDROCHLORIDE 20 MG/ML
15 SOLUTION OROPHARYNGEAL 4 TIMES DAILY PRN
Qty: 100 ML | Refills: 1 | Status: SHIPPED | OUTPATIENT
Start: 2025-05-16

## 2025-05-16 RX ADMIN — ALUMINUM HYDROXIDE, MAGNESIUM HYDROXIDE, AND DIMETHICONE 30 ML: 200; 20; 200 SUSPENSION ORAL at 22:11

## 2025-05-16 RX ADMIN — LIDOCAINE HYDROCHLORIDE 15 ML: 20 SOLUTION ORAL at 22:11

## 2025-05-16 RX ADMIN — DEXAMETHASONE SODIUM PHOSPHATE 10 MG: 10 INJECTION, SOLUTION INTRAMUSCULAR; INTRAVENOUS at 21:24

## 2025-05-16 RX ADMIN — KETOROLAC TROMETHAMINE 30 MG: 30 INJECTION, SOLUTION INTRAMUSCULAR; INTRAVENOUS at 21:24

## 2025-05-17 NOTE — ED PROVIDER NOTES
Time reflects when diagnosis was documented in both MDM as applicable and the Disposition within this note       Time User Action Codes Description Comment    5/16/2025  9:15 PM Ruben Ceballos Add [R13.10] Dysphagia           ED Disposition       None          Assessment & Plan       Medical Decision Making  Amount and/or Complexity of Data Reviewed  Labs: ordered.  Radiology: ordered.    34-year-old female presented to ED with the complaint of difficulty swallowing x 1 week.  Patient states that she normally gets ulcer after eating sour.  She states that she was eating tomato and normally takes fluconazole which relieved her symptoms however continued to have difficulty swallowing.  Patient states that it hurts the after swallowing which prompted his ED visit.  Patient denies any vomiting after.  Differential diagnoses include sore throat/pharyngitis/aphthous viral sore  Plan will obtain CT soft tissue rule out any signs of infection or abscess patient is given Toradol Decadron  CT pending    Patient care transferred to          Medications   ketorolac (TORADOL) injection 30 mg (has no administration in time range)   dexamethasone (PF) (DECADRON) injection 10 mg (has no administration in time range)       ED Risk Strat Scores                    No data recorded        SBIRT 22yo+      Flowsheet Row Most Recent Value   Initial Alcohol Screen: US AUDIT-C     1. How often do you have a drink containing alcohol? 0 Filed at: 05/16/2025 2011   2. How many drinks containing alcohol do you have on a typical day you are drinking?  0 Filed at: 05/16/2025 2011   3a. Male UNDER 65: How often do you have five or more drinks on one occasion? 0 Filed at: 05/16/2025 2011   3b. FEMALE Any Age, or MALE 65+: How often do you have 4 or more drinks on one occassion? 0 Filed at: 05/16/2025 2011   Audit-C Score 0 Filed at: 05/16/2025 2011   BG: How many times in the past year have you...    Used an illegal drug or used a  "prescription medication for non-medical reasons? Never Filed at: 05/16/2025 2011                            History of Present Illness       Chief Complaint   Patient presents with    Difficulty Swallowing     Started about one week ago. Usually gets sores when eating acidic foods, has been having a lot of balsamic vinegar recently.Pain at the end of her esophagus, especially with swallowing, sneezing, and coughing. Unable to eat.        Past Medical History:   Diagnosis Date    Anesthesia     per pt--after ankle surgery was to be discharged and had severe headache -blurred vision -worst migraine feeling ever\" and was admitted over night\"    Anxiety     Asthma     \"sports induced \" not on inhalers    Chronic pain disorder     per pt general nerve and muscle pain--sees a specialized Neurologist at [facility] Dr Gray    Chronic post-traumatic stress disorder (PTSD)     CMTD (Charcot-Rosy-Tooth disease)     Depression     Depression     Heart murmur     Heavy menses     and occas just \"spotting\"    History of COVID-19     twice 2020 and 2021--recovered at home    History of transfusion     Irregular menses     Irritable bowel syndrome     IUD (intrauterine device) in place     Migraines     Mitral valve prolapse     Motion sickness     Muscle weakness     PONV (postoperative nausea and vomiting)     Severe menstrual cramps     \"at times\" per pt    Shortness of breath     per pt \"with exertion and not new\"    Urinary frequency     Vertigo       Past Surgical History:   Procedure Laterality Date    ANKLE SURGERY Right     per pt tendon graph implanted and possible metal implant    DILATION AND CURETTAGE OF UTERUS      DILATION AND CURETTAGE OF UTERUS WITH HYSTEROSOCPY N/A 9/22/2022    Procedure: DILATATION AND CURETTAGE (D&C) WITH HYSTEROSCOPY;  Surgeon: Kristan Toledo DO;  Location: [facility];  Service: Gynecology    INSERTION OF INTRAUTERINE DEVICE (IUD)      VA HYSTEROSCOPY ENDOMETRIAL ABLATION N/A " "9/22/2022    Procedure: ABLATION ENDOMETRIAL NOVASURE;  Surgeon: Kristan Toledo DO;  Location: [facility];  Service: Gynecology    AL REMOVAL INTRAUTERINE DEVICE IUD N/A 9/22/2022    Procedure: REMOVAL OF INTRAUTERINE DEVICE (IUD);  Surgeon: Kristan Toledo DO;  Location: [facility];  Service: Gynecology    TUBAL LIGATION      WISDOM TOOTH EXTRACTION        Family History   Problem Relation Age of Onset    Heart disease Mother     Other Mother         mutant gene \"heart\"    Mitral valve prolapse Mother     No Known Problems Father     Heart disease Maternal Grandmother       Social History[1]   E-Cigarette/Vaping    E-Cigarette Use Never User       E-Cigarette/Vaping Substances    Nicotine No     THC No     CBD No     Flavoring No     Other No     Unknown No       I have reviewed and agree with the history as documented.     HPI    Review of Systems   Constitutional:  Negative for chills and fever.   HENT:  Positive for sore throat. Negative for rhinorrhea.    Eyes:  Negative for visual disturbance.   Respiratory:  Negative for cough and shortness of breath.    Cardiovascular:  Negative for chest pain and leg swelling.   Gastrointestinal:  Negative for abdominal pain, diarrhea, nausea and vomiting.   Genitourinary:  Negative for dysuria.   Musculoskeletal:  Negative for back pain and myalgias.   Skin:  Negative for rash.   Neurological:  Negative for dizziness and headaches.   Psychiatric/Behavioral:  Negative for confusion.    All other systems reviewed and are negative.          Objective       ED Triage Vitals [05/16/25 2012]   Temperature Pulse Blood Pressure Respirations SpO2 Patient Position - Orthostatic VS   (!) 97.4 °F (36.3 °C) 64 143/77 18 98 % Sitting      Temp Source Heart Rate Source BP Location FiO2 (%) Pain Score    Temporal Monitor Left arm -- 2      Vitals      Date and Time Temp Pulse SpO2 Resp BP Pain Score FACES Pain Rating User   05/16/25 2012 97.4 °F (36.3 °C) 64 98 % 18 143/77 2 -- KL "            Physical Exam  Vitals and nursing note reviewed.   Constitutional:       Appearance: She is well-developed.   HENT:      Head: Normocephalic and atraumatic.      Right Ear: External ear normal.      Left Ear: External ear normal.      Nose: Nose normal.      Mouth/Throat:      Mouth: Mucous membranes are moist.      Pharynx: No oropharyngeal exudate.     Eyes:      General: No scleral icterus.        Right eye: No discharge.         Left eye: No discharge.      Conjunctiva/sclera: Conjunctivae normal.      Pupils: Pupils are equal, round, and reactive to light.     Neck:      Comments: Mild tenderness at the anterior neck at the clavicle   Cardiovascular:      Rate and Rhythm: Normal rate and regular rhythm.      Pulses: Normal pulses.      Heart sounds: Normal heart sounds.   Pulmonary:      Effort: Pulmonary effort is normal. No respiratory distress.      Breath sounds: Normal breath sounds. No wheezing.   Abdominal:      General: Bowel sounds are normal.      Palpations: Abdomen is soft.     Musculoskeletal:         General: Normal range of motion.      Cervical back: Normal range of motion and neck supple.   Lymphadenopathy:      Cervical: No cervical adenopathy.     Skin:     General: Skin is warm and dry.      Capillary Refill: Capillary refill takes less than 2 seconds.     Neurological:      General: No focal deficit present.      Mental Status: She is alert and oriented to person, place, and time.     Psychiatric:         Mood and Affect: Mood normal.         Behavior: Behavior normal.         Results Reviewed       Procedure Component Value Units Date/Time    Strep A PCR [[id number]]  (Normal) Collected: 05/16/25 2024    Lab Status: Final result Specimen: Throat Updated: 05/16/25 2057     STREP A PCR Not Detected            CT soft tissue neck wo contrast    (Results Pending)       Procedures    ED Medication and Procedure Management   Prior to Admission Medications   Prescriptions Last Dose  Informant Patient Reported? Taking?   ARIPiprazole (ABILIFY) 5 mg tablet  Self No No   Sig: take 1 tablet by mouth once daily   Patient not taking: Reported on 3/9/2024   DULoxetine (CYMBALTA) 60 mg delayed release capsule  Self No No   Sig: take 2 capsules by mouth once daily   Patient not taking: Reported on 3/9/2024   benzonatate (TESSALON PERLES) 100 mg capsule  Self No No   Sig: Take 1 capsule (100 mg total) by mouth 3 (three) times a day as needed for cough   Patient not taking: Reported on 2/25/2025   buPROPion (Forfivo XL) 450 MG 24 hr tablet   No No   Sig: Take 1 tablet (450 mg total) by mouth daily   Patient not taking: Reported on 2/25/2025   hydrOXYzine pamoate (VISTARIL) 50 mg capsule   No No   Sig: Take 1 capsule (50 mg total) by mouth 2 (two) times a day   ibuprofen (MOTRIN) 600 mg tablet   No No   Sig: Take 1 tablet (600 mg total) by mouth every 6 (six) hours as needed for mild pain for up to 10 days   methylPREDNISolone 4 MG tablet therapy pack  Self No No   Sig: Use as directed on package   Patient not taking: Reported on 2/25/2025   prochlorperazine (COMPAZINE) 10 mg tablet  Self No No   Sig: One tab up to t.i.d. p.r.n. Migraine.  Max 3 days per week.   Patient not taking: Reported on 6/21/2023   propranolol (INDERAL) 10 mg tablet  Self No No   Sig: take 1 tablet by mouth three times a day   Patient not taking: Reported on 3/9/2024   rimegepant sulfate (NURTEC) 75 mg TBDP   No No   Sig: Take 1 tablet (75 mg total) by mouth every other day      Facility-Administered Medications: None     Patient's Medications   Discharge Prescriptions    No medications on file     No discharge procedures on file.  ED SEPSIS DOCUMENTATION   Time reflects when diagnosis was documented in both MDM as applicable and the Disposition within this note       Time User Action Codes Description Comment    5/16/2025  9:15 PM Ruben Ceballos Add [R13.10] Dysphagia                    [1]   Social History  Tobacco Use    Smoking  status: Every Day     Types: E-Cigarettes    Smokeless tobacco: Never   Vaping Use    Vaping status: Never Used   Substance Use Topics    Alcohol use: Not Currently     Comment: social    Drug use: Yes     Types: Marijuana     Comment: medical card        Ruben Ceballos MD  05/16/25 2945

## 2025-05-17 NOTE — DISCHARGE INSTRUCTIONS
A  personal message from Dr. Dominick Correia,  Thank you so much for allowing me to care for you today.    I pride myself in the care and attention I give all my patients.  I hope you were a witness to this tonight.   If for any reason your condition does not improve or worsens, or you have a question that was not answered during your visit you can feel free to text me on my personal phone #  # [phone].   I will answer to your message and continue your care past your emergency room visit.     Please understand that although you are being discharged because your condition has been deemed stable and able to be managed on an outpatient setting. However your condition may worsen as part of the natural progression of the illness/condition, if this occurs please come back to the emergency department for a repeat evaluation.

## 2025-05-29 ENCOUNTER — TELEPHONE (OUTPATIENT)
Age: 35
End: 2025-05-29

## 2025-05-29 NOTE — TELEPHONE ENCOUNTER
Cc'd chart  Received [facility] PA request on 25  Key [id number] that was provided had .  Created new key    Nurtec PA initiated on CMM. (Key: [id number])     Awaiting determination

## 2025-06-04 NOTE — TELEPHONE ENCOUNTER
Per CMM, PA has .     PA Case ID #: [id number]  Need Help? Call us at [phone]  Status      PA Key: [id number] for Nurtec 75mg submitted on CMM.     Awaiting determination.

## 2025-06-06 NOTE — TELEPHONE ENCOUNTER
PA Key: [id number] for Nurtec 75mg has been denied for the following reasons:    You have tried and failed two of the following: Aimovig, Ajovy and Emgality  You have a medical reason for using more than the quantity limit of 15 tablets for 30 days that is supported by guidelines used to treat your condition.     Dr. Cyndie AMOS for Nurtec has been denied (see above for reasons).  Please advise. Thank you.

## 2025-08-18 ENCOUNTER — TELEPHONE (OUTPATIENT)
Dept: NEUROLOGY | Facility: CLINIC | Age: 35
End: 2025-08-18

## (undated) DEVICE — STANDARD SURGICAL GOWN, L: Brand: CONVERTORS

## (undated) DEVICE — BETHLEHEM UNIVERSAL MINOR VAG: Brand: CARDINAL HEALTH

## (undated) DEVICE — NOVASURE ADVANCED DEVICE

## (undated) DEVICE — MAYO STAND COVER: Brand: CONVERTORS

## (undated) DEVICE — INSUFFLATION TUBING PRIMFLO

## (undated) DEVICE — SYRINGE CATH TIP 50ML

## (undated) DEVICE — GLOVE PI ULTRA TOUCH SZ.7.0